# Patient Record
Sex: FEMALE | Race: WHITE | NOT HISPANIC OR LATINO | Employment: FULL TIME | URBAN - METROPOLITAN AREA
[De-identification: names, ages, dates, MRNs, and addresses within clinical notes are randomized per-mention and may not be internally consistent; named-entity substitution may affect disease eponyms.]

---

## 2017-05-01 ENCOUNTER — OFFICE VISIT (OUTPATIENT)
Dept: SURGERY | Facility: CLINIC | Age: 57
End: 2017-05-01

## 2017-05-01 VITALS — HEIGHT: 67 IN | HEART RATE: 87 BPM | OXYGEN SATURATION: 98 % | BODY MASS INDEX: 30.51 KG/M2 | WEIGHT: 194.4 LBS

## 2017-05-01 DIAGNOSIS — F17.200 SMOKER: ICD-10-CM

## 2017-05-01 DIAGNOSIS — K43.2 INCISIONAL HERNIA, WITHOUT OBSTRUCTION OR GANGRENE: Primary | ICD-10-CM

## 2017-05-01 DIAGNOSIS — Z20.818 MRSA EXPOSURE: ICD-10-CM

## 2017-05-01 PROCEDURE — 99204 OFFICE O/P NEW MOD 45 MIN: CPT | Performed by: SURGERY

## 2017-05-01 RX ORDER — SODIUM CHLORIDE 0.9 % (FLUSH) 0.9 %
1-10 SYRINGE (ML) INJECTION AS NEEDED
Status: CANCELLED | OUTPATIENT
Start: 2017-05-01

## 2017-05-01 RX ORDER — NAPROXEN SODIUM 220 MG
220 TABLET ORAL 2 TIMES DAILY PRN
COMMUNITY
End: 2018-10-01

## 2017-05-16 ENCOUNTER — TELEPHONE (OUTPATIENT)
Dept: FAMILY MEDICINE CLINIC | Facility: CLINIC | Age: 57
End: 2017-05-16

## 2017-05-17 ENCOUNTER — OFFICE VISIT (OUTPATIENT)
Dept: FAMILY MEDICINE CLINIC | Facility: CLINIC | Age: 57
End: 2017-05-17

## 2017-05-17 ENCOUNTER — TELEPHONE (OUTPATIENT)
Dept: FAMILY MEDICINE CLINIC | Facility: CLINIC | Age: 57
End: 2017-05-17

## 2017-05-17 VITALS
BODY MASS INDEX: 28.94 KG/M2 | DIASTOLIC BLOOD PRESSURE: 70 MMHG | OXYGEN SATURATION: 96 % | HEART RATE: 91 BPM | WEIGHT: 184.4 LBS | SYSTOLIC BLOOD PRESSURE: 120 MMHG | HEIGHT: 67 IN | TEMPERATURE: 97.9 F | RESPIRATION RATE: 16 BRPM

## 2017-05-17 DIAGNOSIS — Z87.19 HISTORY OF DIVERTICULITIS: ICD-10-CM

## 2017-05-17 DIAGNOSIS — R10.84 GENERALIZED ABDOMINAL PAIN: ICD-10-CM

## 2017-05-17 DIAGNOSIS — R19.7 DIARRHEA, UNSPECIFIED TYPE: Primary | ICD-10-CM

## 2017-05-17 LAB
ERYTHROCYTE [DISTWIDTH] IN BLOOD BY AUTOMATED COUNT: 13 % (ref 4.5–15)
HCT VFR BLD AUTO: 39.3 % (ref 31–42)
HGB BLD-MCNC: 13 G/DL (ref 12–18)
LYMPHOCYTES # BLD AUTO: 2.2 10*3/MM3 (ref 1.2–3.4)
LYMPHOCYTES NFR BLD AUTO: 25.1 % (ref 21–51)
MCH RBC QN AUTO: 30.3 PG (ref 26.1–33.1)
MCHC RBC AUTO-ENTMCNC: 33.1 G/DL (ref 33–37)
MCV RBC AUTO: 91.7 FL (ref 80–99)
MONOCYTES # BLD AUTO: 0.6 10*3/MM3 (ref 0.1–0.6)
MONOCYTES NFR BLD AUTO: 7.5 % (ref 2–9)
NEUTROPHILS # BLD AUTO: 5.8 10*3/MM3 (ref 1.4–6.5)
NEUTROPHILS NFR BLD AUTO: 67.4 % (ref 42–75)
PLATELET # BLD AUTO: 239 10*3/MM3 (ref 150–450)
PMV BLD AUTO: 8.3 FL (ref 7.1–10.5)
RBC # BLD AUTO: 4.28 10*6/MM3 (ref 4–6)
WBC NRBC COR # BLD: 8.6 10*3/MM3 (ref 4.5–10)

## 2017-05-17 PROCEDURE — 99214 OFFICE O/P EST MOD 30 MIN: CPT | Performed by: NURSE PRACTITIONER

## 2017-05-17 PROCEDURE — 36415 COLL VENOUS BLD VENIPUNCTURE: CPT | Performed by: NURSE PRACTITIONER

## 2017-05-17 PROCEDURE — 85025 COMPLETE CBC W/AUTO DIFF WBC: CPT | Performed by: NURSE PRACTITIONER

## 2017-05-17 RX ORDER — SACCHAROMYCES BOULARDII 250 MG
250 CAPSULE ORAL 2 TIMES DAILY
Qty: 60 CAPSULE | Refills: 1 | Status: SHIPPED | OUTPATIENT
Start: 2017-05-17 | End: 2017-12-07

## 2017-05-18 LAB
C DIFF TOX A+B STL QL IA: NEGATIVE
CRYPTOSP AG STL QL IA: NEGATIVE
G LAMBLIA AG STL QL IA: NEGATIVE

## 2017-05-19 ENCOUNTER — TELEPHONE (OUTPATIENT)
Dept: FAMILY MEDICINE CLINIC | Facility: CLINIC | Age: 57
End: 2017-05-19

## 2017-05-22 ENCOUNTER — TELEPHONE (OUTPATIENT)
Dept: FAMILY MEDICINE CLINIC | Facility: CLINIC | Age: 57
End: 2017-05-22

## 2017-05-22 LAB
CAMPYLOBACTER STL CULT: NORMAL
E COLI SXT STL QL IA: NEGATIVE
Lab: NORMAL
Lab: NORMAL
SALM + SHIG STL CULT: NORMAL

## 2017-05-24 ENCOUNTER — APPOINTMENT (OUTPATIENT)
Dept: CT IMAGING | Facility: HOSPITAL | Age: 57
End: 2017-05-24

## 2017-09-25 ENCOUNTER — OFFICE VISIT (OUTPATIENT)
Dept: OBSTETRICS AND GYNECOLOGY | Age: 57
End: 2017-09-25

## 2017-09-25 VITALS
HEIGHT: 66 IN | DIASTOLIC BLOOD PRESSURE: 66 MMHG | WEIGHT: 186 LBS | BODY MASS INDEX: 29.89 KG/M2 | SYSTOLIC BLOOD PRESSURE: 122 MMHG

## 2017-09-25 DIAGNOSIS — R63.8 INCREASED BMI: ICD-10-CM

## 2017-09-25 DIAGNOSIS — K43.2 INCISIONAL HERNIA, WITHOUT OBSTRUCTION OR GANGRENE: ICD-10-CM

## 2017-09-25 DIAGNOSIS — F17.200 SMOKER: ICD-10-CM

## 2017-09-25 DIAGNOSIS — Z01.419 ENCOUNTER FOR GYNECOLOGICAL EXAMINATION WITHOUT ABNORMAL FINDING: Primary | ICD-10-CM

## 2017-09-25 DIAGNOSIS — Z12.4 SCREENING FOR CERVICAL CANCER: ICD-10-CM

## 2017-09-25 PROCEDURE — 99396 PREV VISIT EST AGE 40-64: CPT | Performed by: OBSTETRICS & GYNECOLOGY

## 2017-09-25 NOTE — PROGRESS NOTES
Subjective   Chief Complaint   Patient presents with   • Annual Exam     PT HERE FOR ROUTINE AE WITH MG. SHE IS DOING WELL, DISCOVERED 6 MO'S AGO SHE HAS HERNIA (DOES NOT PLAN ON GETTING SURGERY ANYTIME SOON). LAST PAP 2016 (NEG AND NEG HPV). CHART BEING ORDERED.      History of Present Illness    Mariana Loya is a very pleasant  57 y.o. female who presents for annual exam.  , Mammo Exam TODAY, Contraception MP, Exercise AT WORK.    Obstetric History:  OB History      Para Term  AB Living      0       SAB TAB Ectopic Multiple Live Births                 Menstrual History:     No LMP recorded. Patient is postmenopausal.       Sexual History:       Past Medical History:   Diagnosis Date   • Colon polyps 10/07/2013    Ascending colon biopsy: fragments of tubular adenoma with low grade dysplasia   • Diverticulosis 10/07/2013    Found in Colonoscopy done by Dr. Eddi Anne   • Hyperlipidemia      Past Surgical History:   Procedure Laterality Date   • COLONOSCOPY W/ POLYPECTOMY N/A 10/07/2013    One 10 mm polyp in the proximal ascending colon: resected and retrieved, diverticulosis in the sigmoid colon and in the descending colon-Dr. Eddi Anne    • KNEE ARTHROSCOPY W/ MENISCECTOMY Left 2006    Left knee arthroscopy with complete lateral meniscectomy-Dr. Ming Thorpe   • LAPAROSCOPIC CHOLECYSTECTOMY W/ CHOLANGIOGRAPHY N/A 2001    Dr. Lucy Levi   • TONSILLECTOMY AND ADENOIDECTOMY N/A    • WISDOM TOOTH EXTRACTION N/A     Dr. Judith ROSE Hx:      The following portions of the patient's history were reviewed and updated as appropriate: allergies, current medications, past family history, past medical history, past social history, past surgical history and problem list.    Review of Systems        Except as outlined in history of physical illness, patient denies any changes in her GYN, , GI systems.  All other systems reviewed are negative         Objective   Physical  "Exam    /66  Ht 66\" (167.6 cm)  Wt 186 lb (84.4 kg)  BMI 30.02 kg/m2    General: Patient is alert and oriented and appears overall healthy  Neck: Is supple without thyromegaly, no carotid bruits and no lymphadenopathy  Lungs: Clear bilaterally, no wheezing, rhonchi, or rales.  Respiratory rate is normal  Breast: Even symmetrical, no lymphadenopathy, no retraction, no masses appreciated on either side  Heart: Regular rate and rhythm are appreciated, no murmurs or rubs are heard  Abdomen: Is soft, without organomegaly, bowel sounds are positive, there is no                                rebound or guarding and palpation does not produce any discomfort  Back: Nontender without CVA tenderness  Pelvic: External genitalia appear normal and consistent with mature female.  BUS normal                            Vagina is clean dry without discharge and appears adequately estrogenized, no               lesions or masses are present                         Cervix is noninflamed without discharge or lesions.  There is no cervical motion             tenderness.                Uterus is nonenlarged, without tenderness, and no masses or abnormalities are  present               Adnexa are non-enlarged, non tender               Rectal exam reveals adequate sphincter tone and no masses or lesions are                     appreciated on digital rectal examination.       Patient Active Problem List   Diagnosis   • Hyperlipidemia   • Smoker   • Incisional hernia, without obstruction or gangrene   • MRSA exposure                Assessment/Plan   There are no diagnoses linked to this encounter.    Annual Well Woman Exam    Discussed today's findings and concerns with patient in detail.  Continue to recommend regular exercise to include cardiovascular and resistance training and breast self-exam. Wellness lab, mammography, & pap smear, in accordance with age guidelines.  Dietary and caloric recommendations were " discussed.        All of the patient's questions were addressed and answered, I have encouraged her to call for today's test results if she has not received them within 10 days.  Patient is advised to call with any change in her condition or with any other questions, otherwise return in 12 months for annual examination.

## 2017-09-25 NOTE — PROGRESS NOTES
Subjective   Chief Complaint   Patient presents with   • Annual Exam     PT HERE FOR ROUTINE AE WITH MG. SHE IS DOING WELL, DISCOVERED 6 MO'S AGO SHE HAS HERNIA (DOES NOT PLAN ON GETTING SURGERY ANYTIME SOON). LAST PAP 2016 (NEG AND NEG HPV). CHART BEING ORDERED.      History of Present Illness    Mariana Loya is a very pleasant  57 y.o. female who presents for annual exam.  , Mammo Exam Completed,, Contraception post menopausal, Exercise moderate  Patient was diagnosed with a ventral hernia and is waiting to have surgery. She has no gynecological concerns or complaints..    Obstetric History:  OB History      Para Term  AB Living      0       SAB TAB Ectopic Multiple Live Births                 Menstrual History:     No LMP recorded. Patient is postmenopausal.       Sexual History:       Past Medical History:   Diagnosis Date   • Colon polyps 10/07/2013    Ascending colon biopsy: fragments of tubular adenoma with low grade dysplasia   • Diverticulosis 10/07/2013    Found in Colonoscopy done by Dr. Eddi Anne   • Hyperlipidemia      Past Surgical History:   Procedure Laterality Date   • COLONOSCOPY W/ POLYPECTOMY N/A 10/07/2013    One 10 mm polyp in the proximal ascending colon: resected and retrieved, diverticulosis in the sigmoid colon and in the descending colon-Dr. Eddi Anne    • KNEE ARTHROSCOPY W/ MENISCECTOMY Left 2006    Left knee arthroscopy with complete lateral meniscectomy-Dr. Ming Thorpe   • LAPAROSCOPIC CHOLECYSTECTOMY W/ CHOLANGIOGRAPHY N/A 2001    Dr. Lucy Levi   • TONSILLECTOMY AND ADENOIDECTOMY N/A    • WISDOM TOOTH EXTRACTION N/A     Dr. Judith ROSE Hx:      The following portions of the patient's history were reviewed and updated as appropriate: allergies, current medications, past family history, past medical history, past social history, past surgical history and problem list.    Review of Systems        Except as outlined in history of  "physical illness, patient denies any changes in her GYN, , GI systems.  All other systems reviewed are negative         Objective   Physical Exam    /66  Ht 66\" (167.6 cm)  Wt 186 lb (84.4 kg)  BMI 30.02 kg/m2    General: Patient is alert and oriented and appears overall healthy  Neck: Is supple without thyromegaly, no carotid bruits and no lymphadenopathy  Lungs: Clear bilaterally, no wheezing, rhonchi, or rales.  Respiratory rate is normal  Breast: Even symmetrical, no lymphadenopathy, no retraction, no masses appreciated on either side  Heart: Regular rate and rhythm are appreciated, no murmurs or rubs are heard  Abdomen: Is soft, without organomegaly, bowel sounds are positive, there is no                                rebound or guarding and palpation does not produce any discomfort  Back: Nontender without CVA tenderness  Pelvic: External genitalia appear normal and consistent with mature female.  BUS normal                            Vagina is clean dry without discharge and appears adequately estrogenized, no               lesions or masses are present                         Cervix is noninflamed without discharge or lesions.  There is no cervical motion             tenderness.                Uterus is nonenlarged, without tenderness, and no masses or abnormalities are  present               Adnexa are non-enlarged, non tender               Rectal exam reveals adequate sphincter tone and no masses or lesions are                     appreciated on digital rectal examination.       Patient Active Problem List   Diagnosis   • Hyperlipidemia   • Smoker   • Incisional hernia, without obstruction or gangrene   • MRSA exposure                Assessment/Plan   Mariana was seen today for annual exam.    Diagnoses and all orders for this visit:    Encounter for gynecological examination without abnormal finding  -     IGP, Aptima HPV, Rfx 16 / 18,45    Screening for cervical cancer  -     IGP, Aptima HPV, " Rfx 16 / 18,45    Smoker    Incisional hernia, without obstruction or gangrene  Patient was scheduled for surgery but financially she cannot afford her deductible and has delayed that  Increased BMI  Exercising, discussed nutrition    On endoscopy due to thousand 18      Annual Well Woman Exam    Discussed today's findings and concerns with patient in detail.  Continue to recommend regular exercise to include cardiovascular and resistance training and breast self-exam. Wellness lab, mammography, & pap smear, in accordance with age guidelines.  Dietary and caloric recommendations were discussed.        All of the patient's questions were addressed and answered, I have encouraged her to call for today's test results if she has not received them within 10 days.  Patient is advised to call with any change in her condition or with any other questions, otherwise return in 12 months for annual examination.

## 2017-09-27 LAB
CYTOLOGIST CVX/VAG CYTO: NORMAL
CYTOLOGY CVX/VAG DOC THIN PREP: NORMAL
DX ICD CODE: NORMAL
HIV 1 & 2 AB SER-IMP: NORMAL
HPV I/H RISK 4 DNA CVX QL PROBE+SIG AMP: NEGATIVE
OTHER STN SPEC: NORMAL
PATH REPORT.FINAL DX SPEC: NORMAL
STAT OF ADQ CVX/VAG CYTO-IMP: NORMAL

## 2017-12-07 ENCOUNTER — OFFICE VISIT (OUTPATIENT)
Dept: FAMILY MEDICINE CLINIC | Facility: CLINIC | Age: 57
End: 2017-12-07

## 2017-12-07 VITALS
SYSTOLIC BLOOD PRESSURE: 142 MMHG | TEMPERATURE: 98.8 F | WEIGHT: 187.8 LBS | HEART RATE: 92 BPM | DIASTOLIC BLOOD PRESSURE: 76 MMHG | BODY MASS INDEX: 30.18 KG/M2 | HEIGHT: 66 IN | OXYGEN SATURATION: 98 %

## 2017-12-07 DIAGNOSIS — L30.9 DERMATITIS: ICD-10-CM

## 2017-12-07 DIAGNOSIS — Z00.00 ENCOUNTER FOR ANNUAL GENERAL MEDICAL EXAMINATION WITHOUT ABNORMAL FINDINGS IN ADULT: Primary | ICD-10-CM

## 2017-12-07 DIAGNOSIS — E78.49 OTHER HYPERLIPIDEMIA: ICD-10-CM

## 2017-12-07 LAB
ALBUMIN SERPL-MCNC: 3.9 G/DL (ref 3.5–5.2)
ALBUMIN/GLOB SERPL: 1.2 G/DL
ALP SERPL-CCNC: 73 U/L (ref 39–117)
ALT SERPL W P-5'-P-CCNC: 25 U/L (ref 1–33)
ANION GAP SERPL CALCULATED.3IONS-SCNC: 10.9 MMOL/L
AST SERPL-CCNC: 15 U/L (ref 1–32)
BILIRUB SERPL-MCNC: <0.2 MG/DL (ref 0.1–1.2)
BUN BLD-MCNC: 14 MG/DL (ref 6–20)
BUN/CREAT SERPL: 27.5 (ref 7–25)
CALCIUM SPEC-SCNC: 9.2 MG/DL (ref 8.6–10.5)
CHLORIDE SERPL-SCNC: 108 MMOL/L (ref 98–107)
CHOLEST SERPL-MCNC: 210 MG/DL (ref 0–200)
CO2 SERPL-SCNC: 24.1 MMOL/L (ref 22–29)
CREAT BLD-MCNC: 0.51 MG/DL (ref 0.57–1)
GFR SERPL CREATININE-BSD FRML MDRD: 124 ML/MIN/1.73
GLOBULIN UR ELPH-MCNC: 3.3 GM/DL
GLUCOSE BLD-MCNC: 101 MG/DL (ref 65–99)
HDLC SERPL-MCNC: 37 MG/DL (ref 40–60)
LDLC SERPL CALC-MCNC: 153 MG/DL (ref 0–100)
LDLC/HDLC SERPL: 4.15 {RATIO}
POTASSIUM BLD-SCNC: 4.2 MMOL/L (ref 3.5–5.2)
PROT SERPL-MCNC: 7.2 G/DL (ref 6–8.5)
SODIUM BLD-SCNC: 143 MMOL/L (ref 136–145)
TRIGL SERPL-MCNC: 98 MG/DL (ref 0–150)
VLDLC SERPL-MCNC: 19.6 MG/DL (ref 5–40)

## 2017-12-07 PROCEDURE — 80061 LIPID PANEL: CPT | Performed by: INTERNAL MEDICINE

## 2017-12-07 PROCEDURE — 36415 COLL VENOUS BLD VENIPUNCTURE: CPT | Performed by: INTERNAL MEDICINE

## 2017-12-07 PROCEDURE — 80053 COMPREHEN METABOLIC PANEL: CPT | Performed by: INTERNAL MEDICINE

## 2017-12-07 PROCEDURE — 99396 PREV VISIT EST AGE 40-64: CPT | Performed by: INTERNAL MEDICINE

## 2017-12-07 RX ORDER — ROSUVASTATIN CALCIUM 10 MG/1
TABLET, COATED ORAL
Qty: 30 TABLET | Refills: 0 | Status: SHIPPED | OUTPATIENT
Start: 2017-12-07 | End: 2018-10-01

## 2017-12-07 RX ORDER — CLOTRIMAZOLE AND BETAMETHASONE DIPROPIONATE 10; .64 MG/G; MG/G
CREAM TOPICAL 2 TIMES DAILY
Qty: 30 G | Refills: 1 | Status: SHIPPED | OUTPATIENT
Start: 2017-12-07 | End: 2018-10-01

## 2017-12-07 NOTE — PROGRESS NOTES
Subjective   Mariana Loya is a 57 y.o. female.   Patient here for general yearly medical evaluation.  No new complaints is mild left knee trouble whilst doing fairly well.  Does when a rash on her left leg which she wants examined also.  History of Present Illness   General checkup does have known elevated lipids is very concerned about statins in no she's been off this before.  There is a family history of premature heart disease in father passed at age 49.  Discuss this again with her next encouraged her try 1 she is concern with Lipitor in association with diabetes we discussed this also mentions that Crestor now does generic has a very good cardioprotective type benefit based on studies also suggested she begin a be aspirin day.  Enteric-coated 81 mg.    Review of Systems   Musculoskeletal:        Long-standing left knee stiffness and swelling is residual after prior surgery   Skin:        Rash left leg   All other systems reviewed and are negative.      Objective   Vitals:    12/07/17 0801   BP: 142/76   Pulse: 92   Temp: 98.8 °F (37.1 °C)   SpO2: 98%   Weight: 85.2 kg (187 lb 12.8 oz)     Physical Exam   Constitutional: She appears well-developed and well-nourished.   HENT:   Head: Atraumatic.   Eyes: Conjunctivae are normal. Pupils are equal, round, and reactive to light.   Neck:   No carotid bruits, no increased cervical nodes.   Cardiovascular: Normal rate, regular rhythm and normal heart sounds.    Left and right carotid, radial, femoral, posterior tibial, dorsalis pedis pulses are 2+.   Pulmonary/Chest: Effort normal and breath sounds normal.   No increased cervical, supra/infraclavicular, or inguinal lymphadenopathy noted.   Abdominal: Soft. Bowel sounds are normal.   Musculoskeletal:   Mild swelling long-standing, a residual from prior surgery.   Neurological: She is alert.   Unremarkable gait and station   Skin: Skin is warm and dry.   Several patchy areas above left ankle somewhat patchy with  flakiness appear to be more of a fungal etiology type.  Does claim itching with these.  No rash elsewhere with this.   Nursing note and vitals reviewed.      No results found for: INR    Procedures    Assessment/Plan 1.  Encounter for annual adult wellness examination    2.  Hyperlipidemia plan updated labs we did give patient a prescription for Crestor as generic 10 mg encouraged to begin his lab tab daily she is going to research that if she wants to do this with her concern for side effects and her wanting to avoid medicines whenever possible.    3.  Dermatitis left distal lower leg appears likely fungal in origin plan Lotrisone cream to area twice a day if not much improved 2 weeks time patient is to call.    Also family history for premature heart disease did encourage both enteric-coated aspirin as well as did encourage her to consider statin    Enteric-coated a 1 mg aspirin daily discussed Crestor trial suggest Crestor 10 mg half tab daily recheck lab in 6 weeks patient does wish to think about this.    Smoking cessation are discussed with admission Wellbutrin patient is tried Chantix without benefit does not want to try Wellbutrin has tried the patches without benefit which he believes is with weaning down.  Which she will attempt on her.        Much of this encounter note is an electronic transcription/translation of spoken language to printed text.  The electronic translation of spoken language may permit erroneous, or at times, nonsensical words or phrases to be inadvertently transcribed.  Although I have reviewed the note for such errors, some may still exist.

## 2018-04-23 ENCOUNTER — CLINICAL SUPPORT (OUTPATIENT)
Dept: FAMILY MEDICINE CLINIC | Facility: CLINIC | Age: 58
End: 2018-04-23

## 2018-04-23 DIAGNOSIS — Z00.00 PREVENTATIVE HEALTH CARE: Primary | ICD-10-CM

## 2018-04-23 PROCEDURE — 90471 IMMUNIZATION ADMIN: CPT | Performed by: INTERNAL MEDICINE

## 2018-04-23 PROCEDURE — 90632 HEPA VACCINE ADULT IM: CPT | Performed by: INTERNAL MEDICINE

## 2018-06-20 ENCOUNTER — TELEPHONE (OUTPATIENT)
Dept: FAMILY MEDICINE CLINIC | Facility: CLINIC | Age: 58
End: 2018-06-20

## 2018-06-20 NOTE — TELEPHONE ENCOUNTER
PT ASKED IF DR. CENTENO COULD CALL IN AN RX FOR NICODERM CQ PATCHES TO HELP HER QUIT SMOKING? [facility] [address] [facility] [phone]

## 2018-06-21 RX ORDER — NICOTINE 21 MG/24HR
1 PATCH, TRANSDERMAL 24 HOURS TRANSDERMAL EVERY 24 HOURS
Qty: 30 PATCH | Refills: 0 | Status: SHIPPED | OUTPATIENT
Start: 2018-06-21 | End: 2018-06-25 | Stop reason: SDUPTHER

## 2018-06-25 ENCOUNTER — TELEPHONE (OUTPATIENT)
Dept: FAMILY MEDICINE CLINIC | Facility: CLINIC | Age: 58
End: 2018-06-25

## 2018-06-25 RX ORDER — NICOTINE 21 MG/24HR
1 PATCH, TRANSDERMAL 24 HOURS TRANSDERMAL EVERY 24 HOURS
Qty: 21 PATCH | Refills: 0 | Status: SHIPPED | OUTPATIENT
Start: 2018-06-25 | End: 2018-10-01

## 2018-06-25 NOTE — TELEPHONE ENCOUNTER
Okay to do not sure which brand names are still out there so any those would be okay with me.  Have her do that for 3 weeks we will try to shift her down to a lesser strength.

## 2018-09-08 ENCOUNTER — ANESTHESIA EVENT (OUTPATIENT)
Dept: PERIOP | Facility: HOSPITAL | Age: 58
End: 2018-09-08

## 2018-09-08 ENCOUNTER — HOSPITAL ENCOUNTER (INPATIENT)
Facility: HOSPITAL | Age: 58
LOS: 1 days | Discharge: HOME | End: 2018-09-09
Attending: EMERGENCY MEDICINE | Admitting: SURGERY

## 2018-09-08 ENCOUNTER — APPOINTMENT (OUTPATIENT)
Dept: CT IMAGING | Facility: HOSPITAL | Age: 58
End: 2018-09-08

## 2018-09-08 ENCOUNTER — ANESTHESIA (OUTPATIENT)
Dept: PERIOP | Facility: HOSPITAL | Age: 58
End: 2018-09-08

## 2018-09-08 DIAGNOSIS — K46.0 INCARCERATED HERNIA: Primary | ICD-10-CM

## 2018-09-08 DIAGNOSIS — K56.609 SBO (SMALL BOWEL OBSTRUCTION) (HCC): ICD-10-CM

## 2018-09-08 LAB
ALBUMIN SERPL-MCNC: 4.2 G/DL (ref 3.5–5.2)
ALBUMIN/GLOB SERPL: 1.6 G/DL
ALP SERPL-CCNC: 67 U/L (ref 39–117)
ALT SERPL W P-5'-P-CCNC: 19 U/L (ref 1–33)
ANION GAP SERPL CALCULATED.3IONS-SCNC: 10.7 MMOL/L
AST SERPL-CCNC: 14 U/L (ref 1–32)
BACTERIA UR QL AUTO: ABNORMAL /HPF
BASOPHILS # BLD AUTO: 0.01 10*3/MM3 (ref 0–0.2)
BASOPHILS NFR BLD AUTO: 0.1 % (ref 0–1.5)
BILIRUB SERPL-MCNC: 0.3 MG/DL (ref 0.1–1.2)
BILIRUB UR QL STRIP: NEGATIVE
BUN BLD-MCNC: 11 MG/DL (ref 6–20)
BUN/CREAT SERPL: 16.7 (ref 7–25)
CALCIUM SPEC-SCNC: 8.7 MG/DL (ref 8.6–10.5)
CHLORIDE SERPL-SCNC: 108 MMOL/L (ref 98–107)
CLARITY UR: CLEAR
CO2 SERPL-SCNC: 24.3 MMOL/L (ref 22–29)
COLOR UR: YELLOW
CREAT BLD-MCNC: 0.66 MG/DL (ref 0.57–1)
D-LACTATE SERPL-SCNC: 0.7 MMOL/L (ref 0.5–2)
DEPRECATED RDW RBC AUTO: 46.6 FL (ref 37–54)
EOSINOPHIL # BLD AUTO: 0.07 10*3/MM3 (ref 0–0.7)
EOSINOPHIL NFR BLD AUTO: 1 % (ref 0.3–6.2)
ERYTHROCYTE [DISTWIDTH] IN BLOOD BY AUTOMATED COUNT: 13.8 % (ref 11.7–13)
GFR SERPL CREATININE-BSD FRML MDRD: 92 ML/MIN/1.73
GLOBULIN UR ELPH-MCNC: 2.7 GM/DL
GLUCOSE BLD-MCNC: 124 MG/DL (ref 65–99)
GLUCOSE UR STRIP-MCNC: NEGATIVE MG/DL
HCT VFR BLD AUTO: 42 % (ref 35.6–45.5)
HGB BLD-MCNC: 14.2 G/DL (ref 11.9–15.5)
HGB UR QL STRIP.AUTO: ABNORMAL
HYALINE CASTS UR QL AUTO: ABNORMAL /LPF
IMM GRANULOCYTES # BLD: 0.01 10*3/MM3 (ref 0–0.03)
IMM GRANULOCYTES NFR BLD: 0.1 % (ref 0–0.5)
KETONES UR QL STRIP: NEGATIVE
LEUKOCYTE ESTERASE UR QL STRIP.AUTO: NEGATIVE
LIPASE SERPL-CCNC: 21 U/L (ref 13–60)
LYMPHOCYTES # BLD AUTO: 2.38 10*3/MM3 (ref 0.9–4.8)
LYMPHOCYTES NFR BLD AUTO: 33.2 % (ref 19.6–45.3)
MCH RBC QN AUTO: 31.3 PG (ref 26.9–32)
MCHC RBC AUTO-ENTMCNC: 33.8 G/DL (ref 32.4–36.3)
MCV RBC AUTO: 92.5 FL (ref 80.5–98.2)
MONOCYTES # BLD AUTO: 0.74 10*3/MM3 (ref 0.2–1.2)
MONOCYTES NFR BLD AUTO: 10.3 % (ref 5–12)
NEUTROPHILS # BLD AUTO: 3.97 10*3/MM3 (ref 1.9–8.1)
NEUTROPHILS NFR BLD AUTO: 55.4 % (ref 42.7–76)
NITRITE UR QL STRIP: NEGATIVE
PH UR STRIP.AUTO: 7.5 [PH] (ref 5–8)
PLATELET # BLD AUTO: 211 10*3/MM3 (ref 140–500)
PMV BLD AUTO: 11.2 FL (ref 6–12)
POTASSIUM BLD-SCNC: 3.9 MMOL/L (ref 3.5–5.2)
PROT SERPL-MCNC: 6.9 G/DL (ref 6–8.5)
PROT UR QL STRIP: NEGATIVE
RBC # BLD AUTO: 4.54 10*6/MM3 (ref 3.9–5.2)
RBC # UR: ABNORMAL /HPF
REF LAB TEST METHOD: ABNORMAL
SODIUM BLD-SCNC: 143 MMOL/L (ref 136–145)
SP GR UR STRIP: >=1.03 (ref 1–1.03)
SQUAMOUS #/AREA URNS HPF: ABNORMAL /HPF
UROBILINOGEN UR QL STRIP: ABNORMAL
WBC NRBC COR # BLD: 7.17 10*3/MM3 (ref 4.5–10.7)
WBC UR QL AUTO: ABNORMAL /HPF

## 2018-09-08 PROCEDURE — 25010000002 VANCOMYCIN PER 500 MG: Performed by: ANESTHESIOLOGY

## 2018-09-08 PROCEDURE — 99284 EMERGENCY DEPT VISIT MOD MDM: CPT

## 2018-09-08 PROCEDURE — 83690 ASSAY OF LIPASE: CPT | Performed by: EMERGENCY MEDICINE

## 2018-09-08 PROCEDURE — C1781 MESH (IMPLANTABLE): HCPCS | Performed by: SURGERY

## 2018-09-08 PROCEDURE — 74177 CT ABD & PELVIS W/CONTRAST: CPT

## 2018-09-08 PROCEDURE — 25010000002 ONDANSETRON PER 1 MG: Performed by: EMERGENCY MEDICINE

## 2018-09-08 PROCEDURE — 81001 URINALYSIS AUTO W/SCOPE: CPT | Performed by: EMERGENCY MEDICINE

## 2018-09-08 PROCEDURE — 25010000002 ONDANSETRON PER 1 MG: Performed by: ANESTHESIOLOGY

## 2018-09-08 PROCEDURE — 99223 1ST HOSP IP/OBS HIGH 75: CPT | Performed by: SURGERY

## 2018-09-08 PROCEDURE — 85025 COMPLETE CBC W/AUTO DIFF WBC: CPT | Performed by: EMERGENCY MEDICINE

## 2018-09-08 PROCEDURE — 25010000002 IOPAMIDOL 61 % SOLUTION: Performed by: EMERGENCY MEDICINE

## 2018-09-08 PROCEDURE — 83605 ASSAY OF LACTIC ACID: CPT | Performed by: EMERGENCY MEDICINE

## 2018-09-08 PROCEDURE — 80053 COMPREHEN METABOLIC PANEL: CPT | Performed by: EMERGENCY MEDICINE

## 2018-09-08 PROCEDURE — 25010000002 SUCCINYLCHOLINE PER 20 MG: Performed by: ANESTHESIOLOGY

## 2018-09-08 PROCEDURE — 25010000002 PROPOFOL 10 MG/ML EMULSION: Performed by: ANESTHESIOLOGY

## 2018-09-08 PROCEDURE — 49561 PR REPAIR INCISIONAL HERNIA,STRANG: CPT | Performed by: SURGERY

## 2018-09-08 PROCEDURE — 49568 PR IMPLANT MESH HERNIA REPAIR/DEBRIDEMENT CLOSURE: CPT | Performed by: SURGERY

## 2018-09-08 PROCEDURE — 0WUF0JZ SUPPLEMENT ABDOMINAL WALL WITH SYNTHETIC SUBSTITUTE, OPEN APPROACH: ICD-10-PCS | Performed by: SURGERY

## 2018-09-08 PROCEDURE — 25010000002 MORPHINE PER 10 MG: Performed by: EMERGENCY MEDICINE

## 2018-09-08 DEVICE — VENTRALEX ST HERNIA PATCH
Type: IMPLANTABLE DEVICE | Site: ABDOMEN | Status: FUNCTIONAL
Brand: VENTRALEX ST HERNIA PATCH

## 2018-09-08 RX ORDER — MIDAZOLAM HYDROCHLORIDE 1 MG/ML
2 INJECTION INTRAMUSCULAR; INTRAVENOUS
Status: DISCONTINUED | OUTPATIENT
Start: 2018-09-08 | End: 2018-09-09 | Stop reason: HOSPADM

## 2018-09-08 RX ORDER — SUCCINYLCHOLINE CHLORIDE 20 MG/ML
INJECTION INTRAMUSCULAR; INTRAVENOUS AS NEEDED
Status: DISCONTINUED | OUTPATIENT
Start: 2018-09-08 | End: 2018-09-09 | Stop reason: SURG

## 2018-09-08 RX ORDER — ONDANSETRON 2 MG/ML
4 INJECTION INTRAMUSCULAR; INTRAVENOUS ONCE
Status: COMPLETED | OUTPATIENT
Start: 2018-09-08 | End: 2018-09-08

## 2018-09-08 RX ORDER — MORPHINE SULFATE 2 MG/ML
2 INJECTION, SOLUTION INTRAMUSCULAR; INTRAVENOUS ONCE
Status: COMPLETED | OUTPATIENT
Start: 2018-09-08 | End: 2018-09-08

## 2018-09-08 RX ORDER — MIDAZOLAM HYDROCHLORIDE 1 MG/ML
1 INJECTION INTRAMUSCULAR; INTRAVENOUS
Status: DISCONTINUED | OUTPATIENT
Start: 2018-09-08 | End: 2018-09-09 | Stop reason: HOSPADM

## 2018-09-08 RX ORDER — SODIUM CHLORIDE 0.9 % (FLUSH) 0.9 %
10 SYRINGE (ML) INJECTION AS NEEDED
Status: DISCONTINUED | OUTPATIENT
Start: 2018-09-08 | End: 2018-09-09 | Stop reason: HOSPADM

## 2018-09-08 RX ORDER — BUPIVACAINE HYDROCHLORIDE AND EPINEPHRINE 5; 5 MG/ML; UG/ML
INJECTION, SOLUTION PERINEURAL AS NEEDED
Status: DISCONTINUED | OUTPATIENT
Start: 2018-09-08 | End: 2018-09-09 | Stop reason: HOSPADM

## 2018-09-08 RX ORDER — FENTANYL CITRATE 50 UG/ML
INJECTION, SOLUTION INTRAMUSCULAR; INTRAVENOUS AS NEEDED
Status: DISCONTINUED | OUTPATIENT
Start: 2018-09-08 | End: 2018-09-09 | Stop reason: SURG

## 2018-09-08 RX ORDER — FENTANYL CITRATE 50 UG/ML
50 INJECTION, SOLUTION INTRAMUSCULAR; INTRAVENOUS
Status: DISCONTINUED | OUTPATIENT
Start: 2018-09-08 | End: 2018-09-09 | Stop reason: HOSPADM

## 2018-09-08 RX ORDER — FENTANYL CITRATE 50 UG/ML
INJECTION, SOLUTION INTRAMUSCULAR; INTRAVENOUS
Status: COMPLETED
Start: 2018-09-08 | End: 2018-09-08

## 2018-09-08 RX ORDER — SODIUM CHLORIDE, SODIUM LACTATE, POTASSIUM CHLORIDE, CALCIUM CHLORIDE 600; 310; 30; 20 MG/100ML; MG/100ML; MG/100ML; MG/100ML
9 INJECTION, SOLUTION INTRAVENOUS CONTINUOUS
Status: DISCONTINUED | OUTPATIENT
Start: 2018-09-08 | End: 2018-09-09

## 2018-09-08 RX ORDER — PROPOFOL 10 MG/ML
VIAL (ML) INTRAVENOUS AS NEEDED
Status: DISCONTINUED | OUTPATIENT
Start: 2018-09-08 | End: 2018-09-09 | Stop reason: SURG

## 2018-09-08 RX ORDER — LIDOCAINE HYDROCHLORIDE 10 MG/ML
0.5 INJECTION, SOLUTION EPIDURAL; INFILTRATION; INTRACAUDAL; PERINEURAL ONCE AS NEEDED
Status: DISCONTINUED | OUTPATIENT
Start: 2018-09-08 | End: 2018-09-09 | Stop reason: HOSPADM

## 2018-09-08 RX ORDER — ROCURONIUM BROMIDE 10 MG/ML
INJECTION, SOLUTION INTRAVENOUS AS NEEDED
Status: DISCONTINUED | OUTPATIENT
Start: 2018-09-08 | End: 2018-09-09 | Stop reason: SURG

## 2018-09-08 RX ORDER — SODIUM CHLORIDE 0.9 % (FLUSH) 0.9 %
1-10 SYRINGE (ML) INJECTION AS NEEDED
Status: DISCONTINUED | OUTPATIENT
Start: 2018-09-08 | End: 2018-09-09 | Stop reason: HOSPADM

## 2018-09-08 RX ORDER — FAMOTIDINE 10 MG/ML
20 INJECTION, SOLUTION INTRAVENOUS ONCE
Status: COMPLETED | OUTPATIENT
Start: 2018-09-08 | End: 2018-09-08

## 2018-09-08 RX ORDER — SODIUM CHLORIDE 9 MG/ML
125 INJECTION, SOLUTION INTRAVENOUS CONTINUOUS
Status: DISCONTINUED | OUTPATIENT
Start: 2018-09-08 | End: 2018-09-09

## 2018-09-08 RX ORDER — ONDANSETRON 2 MG/ML
INJECTION INTRAMUSCULAR; INTRAVENOUS AS NEEDED
Status: DISCONTINUED | OUTPATIENT
Start: 2018-09-08 | End: 2018-09-09 | Stop reason: SURG

## 2018-09-08 RX ADMIN — FENTANYL CITRATE 100 MCG: 50 INJECTION INTRAMUSCULAR; INTRAVENOUS at 23:07

## 2018-09-08 RX ADMIN — SUCCINYLCHOLINE CHLORIDE 160 MG: 20 INJECTION, SOLUTION INTRAMUSCULAR; INTRAVENOUS; PARENTERAL at 23:07

## 2018-09-08 RX ADMIN — SODIUM CHLORIDE 125 ML/HR: 9 INJECTION, SOLUTION INTRAVENOUS at 19:05

## 2018-09-08 RX ADMIN — ONDANSETRON 4 MG: 2 INJECTION INTRAMUSCULAR; INTRAVENOUS at 19:07

## 2018-09-08 RX ADMIN — VANCOMYCIN HYDROCHLORIDE 1250 MG: 500 INJECTION, POWDER, LYOPHILIZED, FOR SOLUTION INTRAVENOUS at 23:12

## 2018-09-08 RX ADMIN — FAMOTIDINE 20 MG: 10 INJECTION INTRAVENOUS at 22:47

## 2018-09-08 RX ADMIN — MORPHINE SULFATE 2 MG: 2 INJECTION, SOLUTION INTRAMUSCULAR; INTRAVENOUS at 19:07

## 2018-09-08 RX ADMIN — SODIUM CHLORIDE, POTASSIUM CHLORIDE, SODIUM LACTATE AND CALCIUM CHLORIDE: 600; 310; 30; 20 INJECTION, SOLUTION INTRAVENOUS at 23:00

## 2018-09-08 RX ADMIN — SODIUM CHLORIDE, POTASSIUM CHLORIDE, SODIUM LACTATE AND CALCIUM CHLORIDE: 600; 310; 30; 20 INJECTION, SOLUTION INTRAVENOUS at 23:50

## 2018-09-08 RX ADMIN — ROCURONIUM BROMIDE 30 MG: 10 INJECTION INTRAVENOUS at 23:10

## 2018-09-08 RX ADMIN — SODIUM CHLORIDE, POTASSIUM CHLORIDE, SODIUM LACTATE AND CALCIUM CHLORIDE 9 ML/HR: 600; 310; 30; 20 INJECTION, SOLUTION INTRAVENOUS at 22:47

## 2018-09-08 RX ADMIN — PROPOFOL 150 MG: 10 INJECTION, EMULSION INTRAVENOUS at 23:07

## 2018-09-08 RX ADMIN — IOPAMIDOL 85 ML: 612 INJECTION, SOLUTION INTRAVENOUS at 20:17

## 2018-09-08 RX ADMIN — SUGAMMADEX 200 MG: 100 INJECTION, SOLUTION INTRAVENOUS at 23:59

## 2018-09-08 RX ADMIN — ONDANSETRON 4 MG: 2 INJECTION INTRAMUSCULAR; INTRAVENOUS at 23:59

## 2018-09-08 NOTE — ED NOTES
Resting quietly without complaints.  States pain better after meds, but still present.  Pain 5/10.     Hilda Ramirez, RN  09/08/18 1921

## 2018-09-08 NOTE — ED PROVIDER NOTES
EMERGENCY DEPARTMENT ENCOUNTER    CHIEF COMPLAINT  Chief Complaint: Abd pain  History given by: Pt  History limited by: none  Room Number: 10/10  PMD: Nakul Hinds Jr., MD  Surgeon Dr. Levi    HPI:  Pt is a 58 y.o. female who presents complaining of mid abd pain secondary to a known abd hernia. Pt states pain started at 12:15 PM after she was lifting boxes and twisting at work. Pt has h/o cholecystectomy and developed hernia post-surgery. Pt was going to have hernia repaired in January 2019. Pt has no other sx at this time.     Duration:  Since 12:15 PM  Onset: gradual  Timing: constant  Location: mid abd  Quality: pain  Intensity/Severity: moderate  Progression: worsening  Associated Symptoms: none stated  Aggravating Factors: lifting and twisting at work  Alleviating Factors: none  Previous Episodes: Pt has known abd hernia  Treatment before arrival: none    PAST MEDICAL HISTORY  Active Ambulatory Problems     Diagnosis Date Noted   • Hyperlipidemia 09/19/2016   • Smoker 09/19/2016   • Incisional hernia, without obstruction or gangrene 05/01/2017   • MRSA exposure 05/01/2017     Resolved Ambulatory Problems     Diagnosis Date Noted   • No Resolved Ambulatory Problems     Past Medical History:   Diagnosis Date   • Colon polyps 10/07/2013   • Diverticulosis 10/07/2013   • Hyperlipidemia        PAST SURGICAL HISTORY  Past Surgical History:   Procedure Laterality Date   • COLONOSCOPY W/ POLYPECTOMY N/A 10/07/2013    One 10 mm polyp in the proximal ascending colon: resected and retrieved, diverticulosis in the sigmoid colon and in the descending colon-Dr. Eddi Anne    • KNEE ARTHROSCOPY W/ MENISCECTOMY Left 11/17/2006    Left knee arthroscopy with complete lateral meniscectomy-Dr. Ming Thorpe   • LAPAROSCOPIC CHOLECYSTECTOMY W/ CHOLANGIOGRAPHY N/A 11/14/2001    Dr. Lucy Levi   • TONSILLECTOMY AND ADENOIDECTOMY N/A    • WISDOM TOOTH EXTRACTION N/A 1980    Dr. Wong       FAMILY HISTORY  Family  History   Problem Relation Age of Onset   • Diabetes Mother    • Heart disease Mother         chf   • Heart disease Father    • No Known Problems Sister    • No Known Problems Brother    • No Known Problems Daughter    • No Known Problems Son    • No Known Problems Maternal Grandmother    • No Known Problems Paternal Grandmother    • No Known Problems Maternal Aunt    • No Known Problems Paternal Aunt    • BRCA 1/2 Neg Hx    • Breast cancer Neg Hx    • Colon cancer Neg Hx    • Endometrial cancer Neg Hx    • Ovarian cancer Neg Hx        SOCIAL HISTORY  Social History     Social History   • Marital status:      Spouse name: Orlando   • Number of children: N/A   • Years of education: N/A     Occupational History   • Not on file.     Social History Main Topics   • Smoking status: Current Every Day Smoker     Packs/day: 1.00     Years: 44.00   • Smokeless tobacco: Not on file   • Alcohol use No   • Drug use: No   • Sexual activity: Not on file     Other Topics Concern   • Not on file     Social History Narrative   • No narrative on file       ALLERGIES  Patient has no known allergies.    REVIEW OF SYSTEMS  Review of Systems   Constitutional: Negative for fever.   HENT: Negative for sore throat.    Eyes: Negative.    Respiratory: Negative for cough and shortness of breath.    Cardiovascular: Negative for chest pain.   Gastrointestinal: Positive for abdominal pain (mid). Negative for diarrhea and vomiting.        Known abd hernia   Genitourinary: Negative for dysuria.   Musculoskeletal: Negative for neck pain.   Skin: Negative for rash.   Allergic/Immunologic: Negative.    Neurological: Negative for weakness, numbness and headaches.   Hematological: Negative.    Psychiatric/Behavioral: Negative.    All other systems reviewed and are negative.      PHYSICAL EXAM  ED Triage Vitals [09/08/18 1838]   Temp Heart Rate Resp BP SpO2   98.6 °F (37 °C) (!) 121 18 -- 96 %      Temp src Heart Rate Source Patient Position BP  Location FiO2 (%)   -- Monitor -- -- --       Physical Exam   Constitutional: She is oriented to person, place, and time. No distress.   HENT:   Head: Normocephalic and atraumatic.   Eyes: Pupils are equal, round, and reactive to light. EOM are normal.   Neck: Normal range of motion. Neck supple.   Cardiovascular: Normal rate, regular rhythm and normal heart sounds.    Pulmonary/Chest: Effort normal and breath sounds normal. No respiratory distress.   Abdominal: Soft. There is tenderness (mid abd). There is no rebound and no guarding. A hernia (ventral hernia, tender, not reducible) is present.   Musculoskeletal: Normal range of motion. She exhibits no edema.   Neurological: She is alert and oriented to person, place, and time. She has normal sensation and normal strength.   Skin: Skin is warm and dry. No rash noted.   Psychiatric: Mood and affect normal.   Nursing note and vitals reviewed.      LAB RESULTS  Lab Results (last 24 hours)     Procedure Component Value Units Date/Time    CBC & Differential [[id number]] Collected:  09/08/18 1900    Specimen:  Blood Updated:  09/08/18 1919    Narrative:       The following orders were created for panel order CBC & Differential.  Procedure                               Abnormality         Status                     ---------                               -----------         ------                     CBC Auto Differential[[id number]]        Abnormal            Final result                 Please view results for these tests on the individual orders.    Comprehensive Metabolic Panel [[id number]]  (Abnormal) Collected:  09/08/18 1900    Specimen:  Blood Updated:  09/08/18 1937     Glucose 124 (H) mg/dL      BUN 11 mg/dL      Creatinine 0.66 mg/dL      Sodium 143 mmol/L      Potassium 3.9 mmol/L      Chloride 108 (H) mmol/L      CO2 24.3 mmol/L      Calcium 8.7 mg/dL      Total Protein 6.9 g/dL      Albumin 4.20 g/dL      ALT (SGPT) 19 U/L      AST (SGOT) 14 U/L      Alkaline  Phosphatase 67 U/L      Total Bilirubin 0.3 mg/dL      eGFR Non African Amer 92 mL/min/1.73      Globulin 2.7 gm/dL      A/G Ratio 1.6 g/dL      BUN/Creatinine Ratio 16.7     Anion Gap 10.7 mmol/L     Lipase [[id number]]  (Normal) Collected:  09/08/18 1900    Specimen:  Blood Updated:  09/08/18 1937     Lipase 21 U/L     CBC Auto Differential [[id number]]  (Abnormal) Collected:  09/08/18 1900    Specimen:  Blood Updated:  09/08/18 1919     WBC 7.17 10*3/mm3      RBC 4.54 10*6/mm3      Hemoglobin 14.2 g/dL      Hematocrit 42.0 %      MCV 92.5 fL      MCH 31.3 pg      MCHC 33.8 g/dL      RDW 13.8 (H) %      RDW-SD 46.6 fl      MPV 11.2 fL      Platelets 211 10*3/mm3      Neutrophil % 55.4 %      Lymphocyte % 33.2 %      Monocyte % 10.3 %      Eosinophil % 1.0 %      Basophil % 0.1 %      Immature Grans % 0.1 %      Neutrophils, Absolute 3.97 10*3/mm3      Lymphocytes, Absolute 2.38 10*3/mm3      Monocytes, Absolute 0.74 10*3/mm3      Eosinophils, Absolute 0.07 10*3/mm3      Basophils, Absolute 0.01 10*3/mm3      Immature Grans, Absolute 0.01 10*3/mm3           I ordered the above labs and reviewed the results    RADIOLOGY  CT Abdomen Pelvis With Contrast   Preliminary Result   1.  Abnormal examination with small bowel obstruction caused by a   midline epigastric herniation of a distal jejunal bowel loop.   2.  Small lateral hernia. Postcholecystectomy. 0.8 cm right renal cyst.   3.  2.3 cm right ovarian cyst, six-week follow-up ultrasound is   recommended.       Findings called to Dr. Hess, 8:56 PM.                   I ordered the above noted radiological studies. Interpreted by radiologist. Discussed with radiologist (Dr. Vanegas). Reviewed by me in PACS.       PROCEDURES  Procedures      PROGRESS AND CONSULTS   6:48 PM  Labs, CT abd pelvis, Zofran, and Morphine ordered for abd pain.     9:01 PM  Discussed CT with Dr. Vanegas, radiology, which shows a bowel obstruction secondary to a jejunual loop in hernia.  Consult  placed to surgery.    9:03 PM  Rechecked pt who is resting in NAD. Informed pt of need for surgery. Pt understands and agrees with the plan, all questions answered. Last PO was 6:30-7AM.    9:05 PM  Discussed pt case with Dr. Diaz, surgery, how agrees to admit. He would like an NG tube placed.        MEDICAL DECISION MAKING  Results were reviewed/discussed with the patient and they were also made aware of online access. Pt also made aware that some labs, such as cultures, will not be resulted during ER visit and follow up with PMD is necessary.     MDM  Number of Diagnoses or Management Options     Amount and/or Complexity of Data Reviewed  Clinical lab tests: reviewed and ordered (Glucose-124  WBC-7.17)  Tests in the radiology section of CPT®: reviewed and ordered (CT abd pelvis-bowel obstruction secondary to a jejunual loop in hernia)  Discussion of test results with the performing providers: yes (Dr. Vanegas, radiology)  Decide to obtain previous medical records or to obtain history from someone other than the patient: yes  Review and summarize past medical records: yes  Discuss the patient with other providers: (Dr. Almaraz, surgery)           DIAGNOSIS  Final diagnoses:   Incarcerated hernia   SBO (small bowel obstruction)       DISPOSITION  ADMISSION    Discussed treatment plan and reason for admission with pt/family and admitting physician.  Pt/family voiced understanding of the plan for admission for further testing/treatment as needed.         Latest Documented Vital Signs:  As of 9:14 PM  BP- 153/84 HR- (!) 121 Temp- 98.6 °F (37 °C) O2 sat- 93%    --  Documentation assistance provided by coty Buck for Dr. Hess.  Information recorded by the coty was done at my direction and has been verified and validated by me.          Kaylee Buck  09/08/18 1896       Kentrell Hess MD  09/08/18 3368

## 2018-09-08 NOTE — ED TRIAGE NOTES
Pt was diagnosed with a hernia about a year ago. Pt reports today she noticed the area was harder and that she had increased pain.  Pt denies n/v/d

## 2018-09-08 NOTE — ED TRIAGE NOTES
Pt reports abd. Hernia was at work states she was lifting something and thinks she made it worse. Reports increased abd. Pain

## 2018-09-09 VITALS
SYSTOLIC BLOOD PRESSURE: 139 MMHG | BODY MASS INDEX: 30.29 KG/M2 | HEIGHT: 67 IN | OXYGEN SATURATION: 93 % | TEMPERATURE: 97.4 F | WEIGHT: 193 LBS | DIASTOLIC BLOOD PRESSURE: 75 MMHG | HEART RATE: 84 BPM | RESPIRATION RATE: 16 BRPM

## 2018-09-09 PROBLEM — K46.0 INCARCERATED HERNIA: Status: RESOLVED | Noted: 2018-09-08 | Resolved: 2018-09-09

## 2018-09-09 LAB
ANION GAP SERPL CALCULATED.3IONS-SCNC: 10.6 MMOL/L
BASOPHILS # BLD AUTO: 0.01 10*3/MM3 (ref 0–0.2)
BASOPHILS NFR BLD AUTO: 0.1 % (ref 0–1.5)
BUN BLD-MCNC: 9 MG/DL (ref 6–20)
BUN/CREAT SERPL: 15.3 (ref 7–25)
CALCIUM SPEC-SCNC: 8 MG/DL (ref 8.6–10.5)
CHLORIDE SERPL-SCNC: 108 MMOL/L (ref 98–107)
CO2 SERPL-SCNC: 22.4 MMOL/L (ref 22–29)
CREAT BLD-MCNC: 0.59 MG/DL (ref 0.57–1)
DEPRECATED RDW RBC AUTO: 50 FL (ref 37–54)
EOSINOPHIL # BLD AUTO: 0.09 10*3/MM3 (ref 0–0.7)
EOSINOPHIL NFR BLD AUTO: 0.9 % (ref 0.3–6.2)
ERYTHROCYTE [DISTWIDTH] IN BLOOD BY AUTOMATED COUNT: 13.9 % (ref 11.7–13)
GFR SERPL CREATININE-BSD FRML MDRD: 104 ML/MIN/1.73
GLUCOSE BLD-MCNC: 133 MG/DL (ref 65–99)
HCT VFR BLD AUTO: 40.3 % (ref 35.6–45.5)
HGB BLD-MCNC: 12.6 G/DL (ref 11.9–15.5)
IMM GRANULOCYTES # BLD: 0.02 10*3/MM3 (ref 0–0.03)
IMM GRANULOCYTES NFR BLD: 0.2 % (ref 0–0.5)
LYMPHOCYTES # BLD AUTO: 2.07 10*3/MM3 (ref 0.9–4.8)
LYMPHOCYTES NFR BLD AUTO: 20.9 % (ref 19.6–45.3)
MCH RBC QN AUTO: 30.4 PG (ref 26.9–32)
MCHC RBC AUTO-ENTMCNC: 31.3 G/DL (ref 32.4–36.3)
MCV RBC AUTO: 97.1 FL (ref 80.5–98.2)
MONOCYTES # BLD AUTO: 0.95 10*3/MM3 (ref 0.2–1.2)
MONOCYTES NFR BLD AUTO: 9.6 % (ref 5–12)
NEUTROPHILS # BLD AUTO: 6.75 10*3/MM3 (ref 1.9–8.1)
NEUTROPHILS NFR BLD AUTO: 68.3 % (ref 42.7–76)
PLATELET # BLD AUTO: 178 10*3/MM3 (ref 140–500)
PMV BLD AUTO: 11.2 FL (ref 6–12)
POTASSIUM BLD-SCNC: 3.7 MMOL/L (ref 3.5–5.2)
RBC # BLD AUTO: 4.15 10*6/MM3 (ref 3.9–5.2)
SODIUM BLD-SCNC: 141 MMOL/L (ref 136–145)
WBC NRBC COR # BLD: 9.89 10*3/MM3 (ref 4.5–10.7)

## 2018-09-09 PROCEDURE — 80048 BASIC METABOLIC PNL TOTAL CA: CPT | Performed by: SURGERY

## 2018-09-09 PROCEDURE — 85025 COMPLETE CBC W/AUTO DIFF WBC: CPT | Performed by: SURGERY

## 2018-09-09 PROCEDURE — 25010000002 ENOXAPARIN PER 10 MG: Performed by: SURGERY

## 2018-09-09 PROCEDURE — 25010000002 FENTANYL CITRATE (PF) 100 MCG/2ML SOLUTION: Performed by: ANESTHESIOLOGY

## 2018-09-09 PROCEDURE — 99024 POSTOP FOLLOW-UP VISIT: CPT | Performed by: SURGERY

## 2018-09-09 RX ORDER — SODIUM CHLORIDE 0.9 % (FLUSH) 0.9 %
1-10 SYRINGE (ML) INJECTION AS NEEDED
Status: DISCONTINUED | OUTPATIENT
Start: 2018-09-09 | End: 2018-09-09 | Stop reason: HOSPADM

## 2018-09-09 RX ORDER — HYDROCODONE BITARTRATE AND ACETAMINOPHEN 7.5; 325 MG/1; MG/1
1 TABLET ORAL ONCE AS NEEDED
Status: DISCONTINUED | OUTPATIENT
Start: 2018-09-09 | End: 2018-09-09 | Stop reason: HOSPADM

## 2018-09-09 RX ORDER — LABETALOL HYDROCHLORIDE 5 MG/ML
5 INJECTION, SOLUTION INTRAVENOUS
Status: DISCONTINUED | OUTPATIENT
Start: 2018-09-09 | End: 2018-09-09 | Stop reason: HOSPADM

## 2018-09-09 RX ORDER — PROMETHAZINE HYDROCHLORIDE 25 MG/1
12.5 SUPPOSITORY RECTAL EVERY 6 HOURS PRN
Status: DISCONTINUED | OUTPATIENT
Start: 2018-09-09 | End: 2018-09-09 | Stop reason: HOSPADM

## 2018-09-09 RX ORDER — ACETAMINOPHEN 325 MG/1
650 TABLET ORAL EVERY 4 HOURS PRN
Status: DISCONTINUED | OUTPATIENT
Start: 2018-09-09 | End: 2018-09-09 | Stop reason: HOSPADM

## 2018-09-09 RX ORDER — NALOXONE HCL 0.4 MG/ML
0.4 VIAL (ML) INJECTION
Status: DISCONTINUED | OUTPATIENT
Start: 2018-09-09 | End: 2018-09-09 | Stop reason: HOSPADM

## 2018-09-09 RX ORDER — OXYCODONE AND ACETAMINOPHEN 7.5; 325 MG/1; MG/1
1 TABLET ORAL ONCE AS NEEDED
Status: DISCONTINUED | OUTPATIENT
Start: 2018-09-09 | End: 2018-09-09 | Stop reason: HOSPADM

## 2018-09-09 RX ORDER — PROMETHAZINE HYDROCHLORIDE 12.5 MG/1
12.5 TABLET ORAL EVERY 6 HOURS PRN
Qty: 10 TABLET | Refills: 0 | Status: SHIPPED | OUTPATIENT
Start: 2018-09-09 | End: 2018-09-25

## 2018-09-09 RX ORDER — NAPROXEN 250 MG/1
250 TABLET ORAL 2 TIMES DAILY PRN
Status: DISCONTINUED | OUTPATIENT
Start: 2018-09-09 | End: 2018-09-09 | Stop reason: HOSPADM

## 2018-09-09 RX ORDER — HYDROCODONE BITARTRATE AND ACETAMINOPHEN 5; 325 MG/1; MG/1
1 TABLET ORAL EVERY 4 HOURS PRN
Status: DISCONTINUED | OUTPATIENT
Start: 2018-09-09 | End: 2018-09-09 | Stop reason: HOSPADM

## 2018-09-09 RX ORDER — FLUMAZENIL 0.1 MG/ML
0.2 INJECTION INTRAVENOUS AS NEEDED
Status: DISCONTINUED | OUTPATIENT
Start: 2018-09-09 | End: 2018-09-09 | Stop reason: HOSPADM

## 2018-09-09 RX ORDER — FENTANYL CITRATE 50 UG/ML
50 INJECTION, SOLUTION INTRAMUSCULAR; INTRAVENOUS
Status: DISCONTINUED | OUTPATIENT
Start: 2018-09-09 | End: 2018-09-09 | Stop reason: HOSPADM

## 2018-09-09 RX ORDER — PROMETHAZINE HYDROCHLORIDE 25 MG/1
25 SUPPOSITORY RECTAL ONCE AS NEEDED
Status: DISCONTINUED | OUTPATIENT
Start: 2018-09-09 | End: 2018-09-09 | Stop reason: HOSPADM

## 2018-09-09 RX ORDER — VANCOMYCIN HYDROCHLORIDE 500 MG/10ML
INJECTION, POWDER, LYOPHILIZED, FOR SOLUTION INTRAVENOUS AS NEEDED
Status: DISCONTINUED | OUTPATIENT
Start: 2018-09-08 | End: 2018-09-09 | Stop reason: SURG

## 2018-09-09 RX ORDER — PROMETHAZINE HYDROCHLORIDE 25 MG/ML
12.5 INJECTION, SOLUTION INTRAMUSCULAR; INTRAVENOUS ONCE AS NEEDED
Status: DISCONTINUED | OUTPATIENT
Start: 2018-09-09 | End: 2018-09-09 | Stop reason: HOSPADM

## 2018-09-09 RX ORDER — PROMETHAZINE HYDROCHLORIDE 25 MG/1
12.5 TABLET ORAL ONCE AS NEEDED
Status: DISCONTINUED | OUTPATIENT
Start: 2018-09-09 | End: 2018-09-09 | Stop reason: HOSPADM

## 2018-09-09 RX ORDER — PROMETHAZINE HYDROCHLORIDE 25 MG/1
25 TABLET ORAL ONCE AS NEEDED
Status: DISCONTINUED | OUTPATIENT
Start: 2018-09-09 | End: 2018-09-09 | Stop reason: HOSPADM

## 2018-09-09 RX ORDER — DIPHENHYDRAMINE HYDROCHLORIDE 50 MG/ML
12.5 INJECTION INTRAMUSCULAR; INTRAVENOUS
Status: DISCONTINUED | OUTPATIENT
Start: 2018-09-09 | End: 2018-09-09 | Stop reason: HOSPADM

## 2018-09-09 RX ORDER — SODIUM CHLORIDE, SODIUM LACTATE, POTASSIUM CHLORIDE, CALCIUM CHLORIDE 600; 310; 30; 20 MG/100ML; MG/100ML; MG/100ML; MG/100ML
100 INJECTION, SOLUTION INTRAVENOUS CONTINUOUS
Status: DISCONTINUED | OUTPATIENT
Start: 2018-09-09 | End: 2018-09-09 | Stop reason: HOSPADM

## 2018-09-09 RX ORDER — PROMETHAZINE HYDROCHLORIDE 25 MG/ML
12.5 INJECTION, SOLUTION INTRAMUSCULAR; INTRAVENOUS EVERY 6 HOURS PRN
Status: DISCONTINUED | OUTPATIENT
Start: 2018-09-09 | End: 2018-09-09 | Stop reason: HOSPADM

## 2018-09-09 RX ORDER — ROSUVASTATIN CALCIUM 5 MG/1
5 TABLET, COATED ORAL DAILY
Status: DISCONTINUED | OUTPATIENT
Start: 2018-09-09 | End: 2018-09-09 | Stop reason: HOSPADM

## 2018-09-09 RX ORDER — PYRAZINAMIDE 500 MG/1
1 TABLET ORAL EVERY 6 HOURS PRN
Qty: 30 TABLET | Refills: 0 | Status: SHIPPED | OUTPATIENT
Start: 2018-09-09 | End: 2018-09-25

## 2018-09-09 RX ORDER — NALOXONE HCL 0.4 MG/ML
0.2 VIAL (ML) INJECTION AS NEEDED
Status: DISCONTINUED | OUTPATIENT
Start: 2018-09-09 | End: 2018-09-09 | Stop reason: HOSPADM

## 2018-09-09 RX ORDER — ONDANSETRON 2 MG/ML
4 INJECTION INTRAMUSCULAR; INTRAVENOUS ONCE AS NEEDED
Status: DISCONTINUED | OUTPATIENT
Start: 2018-09-09 | End: 2018-09-09 | Stop reason: HOSPADM

## 2018-09-09 RX ORDER — PROMETHAZINE HYDROCHLORIDE 12.5 MG/1
12.5 TABLET ORAL EVERY 6 HOURS PRN
Status: DISCONTINUED | OUTPATIENT
Start: 2018-09-09 | End: 2018-09-09 | Stop reason: HOSPADM

## 2018-09-09 RX ORDER — EPHEDRINE SULFATE 50 MG/ML
5 INJECTION, SOLUTION INTRAVENOUS ONCE AS NEEDED
Status: DISCONTINUED | OUTPATIENT
Start: 2018-09-09 | End: 2018-09-09 | Stop reason: HOSPADM

## 2018-09-09 RX ORDER — NICOTINE 21 MG/24HR
1 PATCH, TRANSDERMAL 24 HOURS TRANSDERMAL EVERY 24 HOURS
Status: DISCONTINUED | OUTPATIENT
Start: 2018-09-09 | End: 2018-09-09 | Stop reason: HOSPADM

## 2018-09-09 RX ORDER — AMOXICILLIN 250 MG
2 CAPSULE ORAL DAILY PRN
Qty: 30 TABLET | Refills: 1 | Status: SHIPPED | OUTPATIENT
Start: 2018-09-09 | End: 2018-10-01

## 2018-09-09 RX ADMIN — HYDROCODONE BITARTRATE AND ACETAMINOPHEN 1 TABLET: 5; 325 TABLET ORAL at 04:01

## 2018-09-09 RX ADMIN — ENOXAPARIN SODIUM 40 MG: 40 INJECTION SUBCUTANEOUS at 08:25

## 2018-09-09 RX ADMIN — FENTANYL CITRATE 100 MCG: 50 INJECTION INTRAMUSCULAR; INTRAVENOUS at 00:11

## 2018-09-09 RX ADMIN — SODIUM CHLORIDE, POTASSIUM CHLORIDE, SODIUM LACTATE AND CALCIUM CHLORIDE 100 ML/HR: 600; 310; 30; 20 INJECTION, SOLUTION INTRAVENOUS at 08:25

## 2018-09-09 RX ADMIN — NICOTINE 1 PATCH: 21 PATCH, EXTENDED RELEASE TRANSDERMAL at 08:25

## 2018-09-09 NOTE — OP NOTE
PREOPERATIVE DIAGNOSIS:  Incarcerated supraumbilical incisional hernia   POSTOPERATIVE DIAGNOSIS: Same  PROCEDURE:  Open supraumbilical incisional hernia repair with mesh placement  SURGEON/STAFF:  AILYN Diaz  ANESTHESIA:  General.   BLOOD LOSS: minimal  COUNTS:  Needle and sponge count correct.  SPECIMENS:  None  Mesh: Ventralex ST  6.4 cm circular patch    INDICATIONS FOR OPERATION:  Mariana Loya is a 58 y.o. year old female who presented to the emergency room with abdominal pain and a bulge in the supraumbilical area.  She was found to have an incarcerated supraumbilical incisional hernia.     The risks and benefits of open, conservative and laparoscopic management were discussed at length and in detail with the patient.  Discussed with her about the need of possible mesh in case fascia is thinned and weak.  Discussed with her about the risk of bleeding, infections, hernia recurrence and mesh infections.  Patient verbalized understanding and agree with the plan    OPERATION:  The patient was brought to the operating room in stable condition.   Preoperative antibiotics were given and sequential compression devices were applied.  At this time, the patient was laid supine on the operating room table.  General anesthesia was induced by the Anesthesia service without difficulty.  The patient's abdomen was prepped and draped in the usual sterile fashion.      At this time, half percent Marcaine with epinephrine was injected in the supraumbilical area. A vertical incision was performed supraumbilically. Dissection was carried down to the muscular fascia.  The hernia sac was recognized and he was dissected circumferentially from its surrounding attachments.  A loop of small bowel was found to be incarcerated at the level of the fascia.  This was easily reduced into the abdominal cavity.  Dissection of the hernia sac was performed with Bovie electrocautery and blunt dissection.  The hernia sac was detached from the fascia.   The abdominal fascia was recognized and the defect was find to be 1 x 1 cm.  The fascia was extremely thin secondary to rectus muscle diastases.  I continued the dissection of the hernia sac circumferentially at the level of the muscular fascia.  A retro-fascial plane was created.  The  hernia contents were reduced into the abdominal cavity.  At this point a Ventralex ST 6.4 cm was brought to the operating room.  The hernia patch was positioned in the intraperitoneal space with adequate overlap of the defect.  The muscular fascia was then dissected circumferentially to obtain at least 2 cm of healthy fascia.  The hernia defect was then closed with figure of eight  #1 PDS sutures. The medial suture incorporated the mesh straps. The subcutaneous tissue was closed in 2 layers with 3-0 Vicryl and 4-0 Monocryl.  The wound was closed with surgical glue.     The patient tolerated the procedure well.  Instrument and sponge count were correct.    The patient was extubated in the recovery room in stable condition.  I, the attending surgeon, performed the entire operation.    Kody Diaz MD  General, Minimally Invasive and Endoscopic Surgery  [facility]

## 2018-09-09 NOTE — ANESTHESIA PROCEDURE NOTES
Airway  Urgency: elective    Airway not difficult    General Information and Staff    Patient location during procedure: OR  Anesthesiologist: STELLA SCHAFFER    Indications and Patient Condition  Indications: operative anesthetic; GETA.    Preoxygenated: yes  Mask difficulty assessment: 1 - vent by mask    Final Airway Details  Final airway type: endotracheal airway      Successful airway: ETT  Cuffed: yes   Successful intubation technique: direct laryngoscopy  Endotracheal tube insertion site: oral  Blade: Barajas  Blade size: 2  ETT size: 7.0 mm  Cormack-Lehane Classification: grade I - full view of glottis  Placement verified by: chest auscultation and capnometry   Cuff volume (mL): 6  Measured from: gums  ETT to gums (cm): 21  Number of attempts at approach: 1

## 2018-09-09 NOTE — ANESTHESIA POSTPROCEDURE EVALUATION
"Patient: Mariana Loya    Procedure Summary     Date:  09/08/18 Room / Location:  [facility] OR  / [facility] MAIN OR    Anesthesia Start:  2300 Anesthesia Stop:  09/09/18 0027    Procedure:  incarcerated /INCISIONAL HERNIA REPAIR with mesh (N/A Abdomen) Diagnosis:      Surgeon:  Kody Diaz MD Provider:  Isaias Garcia MD    Anesthesia Type:  general ASA Status:  3 - Emergent          Anesthesia Type: general  Last vitals  BP   140/84 (09/09/18 0129)   Temp   36.4 °C (97.6 °F) (09/09/18 0129)   Pulse   81 (09/09/18 0129)   Resp   16 (09/09/18 0129)     SpO2   95 % (09/09/18 0129)     Post Anesthesia Care and Evaluation    Patient location during evaluation: bedside  Patient participation: complete - patient participated  Level of consciousness: awake  Pain score: 2  Pain management: adequate  Airway patency: patent  Anesthetic complications: No anesthetic complications  PONV Status: none  Cardiovascular status: acceptable  Respiratory status: acceptable  Hydration status: acceptable    Comments: /84 (BP Location: Left arm, Patient Position: Lying)   Pulse 81   Temp 36.4 °C (97.6 °F) (Oral)   Resp 16   Ht 170.2 cm (67\")   Wt 87.5 kg (193 lb)   SpO2 95%   BMI 30.23 kg/m²         "

## 2018-09-09 NOTE — DISCHARGE SUMMARY
Admission date: 9/8/2018   Discharge date: 9/9/18.     Admission diagnosis: SBO (small bowel obstruction) [K56.609]  Incarcerated hernia [K46.0]     Discharge diagnosis: Incarcerated supraumbilical incisional hernia     Procedure: Open incisional hernia repair with mesh placement     Meds:      Your medication list      START taking these medications      Instructions Last Dose Given Next Dose Due   acetaminophen-codeine 300-30 MG per tablet  Commonly known as:  TYLENOL/CODEINE #3      Take 1 tablet by mouth Every 6 (Six) Hours As Needed for Moderate Pain  for up to 30 doses.       promethazine 12.5 MG tablet  Commonly known as:  PHENERGAN      Take 1 tablet by mouth Every 6 (Six) Hours As Needed for Nausea or Vomiting for up to 10 doses.       senna-docusate 8.6-50 MG per tablet  Commonly known as:  PERICOLACE      Take 2 tablets by mouth Daily As Needed for Constipation.          CONTINUE taking these medications      Instructions Last Dose Given Next Dose Due   clotrimazole-betamethasone 1-0.05 % cream  Commonly known as:  LOTRISONE      Apply  topically 2 (Two) Times a Day.       naproxen sodium 220 MG tablet  Commonly known as:  ALEVE      Take 220 mg by mouth 2 (Two) Times a Day As Needed for Mild Pain (1-3).       nicotine 21 MG/24HR patch  Commonly known as:  NICODERM CQ      Place 1 patch on the skin Daily.       rosuvastatin 10 MG tablet  Commonly known as:  CRESTOR      Initially take half tablet daily if tolerated increase to whole tablet daily             Where to Get Your Medications      These medications were sent to [id number] - [place], [place] - [address] - [phone] [place] [id number] [address] [address]    Phone:  [phone]   · promethazine 12.5 MG tablet  · senna-docusate 8.6-50 MG per tablet     You can get these medications from any pharmacy    Bring a paper prescription for each of these medications  · acetaminophen-codeine 300-30 MG per  tablet         Instructions:    - No heavy lifting of more than 15 lb for 6 weeks. Can start doing aerobic type exercise in 4 weeks.   - No driving while taking pain medications  - Take medications as prescribed.   - Can shower, no bath tub    Follow up in my office in 2 weeks     Kody Diaz MD  General, Minimally Invasive and Endoscopic Surgery  [facility]

## 2018-09-09 NOTE — ANESTHESIA PREPROCEDURE EVALUATION
Anesthesia Evaluation     Patient summary reviewed and Nursing notes reviewed   no history of anesthetic complications:  NPO Solid Status: Waived due to emergency  NPO Liquid Status: Waived due to emergency           Airway   Mallampati: II  TM distance: >3 FB  Neck ROM: full  Dental - normal exam     Pulmonary - normal exam   (+) a smoker Current Smoked day of surgery,   Cardiovascular - normal exam    (+) hyperlipidemia,       Neuro/Psych- negative ROS  GI/Hepatic/Renal/Endo    (+) obesity,       Musculoskeletal (-) negative ROS    Abdominal    Substance History      OB/GYN          Other                        Anesthesia Plan    ASA 3 - emergent     general     Anesthetic plan, all risks, benefits, and alternatives have been provided, discussed and informed consent has been obtained with: patient.

## 2018-09-09 NOTE — PLAN OF CARE
Problem: Patient Care Overview  Goal: Plan of Care Review  Outcome: Ongoing (interventions implemented as appropriate)   09/09/18 6100   Coping/Psychosocial   Plan of Care Reviewed With patient;spouse   Plan of Care Review   Progress improving   OTHER   Outcome Summary Admitted from PACU, came to ER with abdominal pain, had surgery for encarcerated hernia. Ambulated to the bathroom, pain controlled with norco, anticipating discharge today. Continue to monitor.       Problem: Pain, Acute (Adult)  Goal: Identify Related Risk Factors and Signs and Symptoms  Outcome: Ongoing (interventions implemented as appropriate)    Goal: Acceptable Pain Control/Comfort Level  Outcome: Ongoing (interventions implemented as appropriate)

## 2018-09-09 NOTE — PROGRESS NOTES
POD# 1 s/p open incisional hernia repair with mesh for incarcerated bowel    S: No issues, feeling great. Tolerating regular diet    O:   Vitals:    09/09/18 0110 09/09/18 0129 09/09/18 0455 09/09/18 0747   BP: 135/66 140/84 133/72 139/75   BP Location: Left arm Left arm Left arm Left arm   Patient Position: Lying Lying Lying Lying   Pulse: 83 81 91 84   Resp: 18 16 16 16   Temp: 97.8 °F (36.6 °C) 97.6 °F (36.4 °C) 97.5 °F (36.4 °C) 97.4 °F (36.3 °C)   TempSrc: Oral Oral Oral Oral   SpO2: 97% 95% 93% 93%   Weight:       Height:         Abdomen soft, mildly tender over incision, Incision c/d/i. No signs of infection, partially reducible subxiphoid hernia, mildly tender    POD# 1 s/p open incisional hernia repair with mesh for incarcerated bowel, doing great, tolerating diet    - Home today  - Counseled about smoking cessation  - No heavy lifting  - Follow up in my office in 2 weeks    Kody Diaz MD, FACS  General, Minimally Invasive and Endoscopic Surgery  [facility]    [address], [address]  [place], [place], [address]  P: [phone]  F: [phone]

## 2018-09-09 NOTE — H&P
Admit H&P    Admiting Physician: Kody Diaz MD    Reason for Admission: Incarcerated incisional hernia    Chief Complaint   Patient presents with   • Abdominal Pain       HPI:   The patient is a very pleasant 58 y.o. years old female that presented to the hospital with severe abdominal pain that started this afternoon.  The patient reports lifting heavy stuff at work today and she developed sudden onset of supraumbilical abdominal pain associated with a bulge.  She described the pain as sharp located over the supraumbilical area that did not radiate, the intensity of the pain was 7 out of 10 and improved with morphine at the emergency room.  She denies any nausea or vomiting or any obstructing signs.  She has history of subxiphoid hernia that has been mildly symptomatic.  Her symptoms included intermittent episodes of sharp pain.  She saw Dr. Levi on May 2017.  At that time she was recommended to undergo open incisional hernia repair with possible mesh placement.  Patient wanted to wait until January of next year because of financial issues and never had it repaired.  She denies any fevers chills.  No other complaints.  CT scan of the abdomen and pelvis was performed in the emergency room that show evidence of incarcerated supraumbilical hernia with a loop of small bowel.      Past Medical History:   Diagnosis Date   • Colon polyps 10/07/2013    Ascending colon biopsy: fragments of tubular adenoma with low grade dysplasia   • Diverticulosis 10/07/2013    Found in Colonoscopy done by Dr. Eddi Anne   • Hyperlipidemia        Past Surgical History:   Procedure Laterality Date   • COLONOSCOPY W/ POLYPECTOMY N/A 10/07/2013    One 10 mm polyp in the proximal ascending colon: resected and retrieved, diverticulosis in the sigmoid colon and in the descending colon-Dr. Eddi Anne    • KNEE ARTHROSCOPY W/ MENISCECTOMY Left 11/17/2006    Left knee arthroscopy with complete lateral meniscectomy-Dr. Ming Thorpe    • LAPAROSCOPIC CHOLECYSTECTOMY W/ CHOLANGIOGRAPHY N/A 11/14/2001    Dr. Lucy Levi   • TONSILLECTOMY AND ADENOIDECTOMY N/A    • WISDOM TOOTH EXTRACTION N/A 1980    Dr. Wong         Current Facility-Administered Medications:   •  fentaNYL citrate (PF) (SUBLIMAZE) 100 MCG/2ML injection  - ADS Override Pull, , , ,   •  fentaNYL citrate (PF) (SUBLIMAZE) injection 50 mcg, 50 mcg, Intravenous, Q10 Min PRN, Isaias Garcia MD  •  lactated ringers infusion, 9 mL/hr, Intravenous, Continuous, Isaias Garcia MD, Last Rate: 9 mL/hr at 09/08/18 2247, 9 mL/hr at 09/08/18 2247  •  lidocaine PF 1% (XYLOCAINE) injection 0.5 mL, 0.5 mL, Injection, Once PRN, Isaias Garcia MD  •  midazolam (VERSED) injection 1 mg, 1 mg, Intravenous, Q5 Min PRN **OR** midazolam (VERSED) injection 2 mg, 2 mg, Intravenous, Q5 Min PRN, Isaias Garcia MD  •  sodium chloride 0.9 % flush 1-10 mL, 1-10 mL, Intravenous, PRN, Isaias Garcia MD  •  Insert peripheral IV, , , Once **AND** [MAR Hold] sodium chloride 0.9 % flush 10 mL, 10 mL, Intravenous, PRN, Kentrell Hess MD  •  sodium chloride 0.9 % infusion, 125 mL/hr, Intravenous, Continuous, Kentrell Hess MD, Last Rate: 125 mL/hr at 09/08/18 1905, 125 mL/hr at 09/08/18 1905    No Known Allergies    Social History     Social History   • Marital status:      Spouse name: Orlando   • Number of children: N/A   • Years of education: N/A     Occupational History   • Not on file.     Social History Main Topics   • Smoking status: Current Every Day Smoker     Packs/day: 1.00     Years: 44.00   • Smokeless tobacco: Not on file   • Alcohol use No   • Drug use: No   • Sexual activity: Not on file     Other Topics Concern   • Not on file     Social History Narrative   • No narrative on file       Family History   Problem Relation Age of Onset   • Diabetes Mother    • Heart disease Mother         chf   • Heart disease Father    • No Known Problems Sister    • No Known  Problems Brother    • No Known Problems Daughter    • No Known Problems Son    • No Known Problems Maternal Grandmother    • No Known Problems Paternal Grandmother    • No Known Problems Maternal Aunt    • No Known Problems Paternal Aunt    • BRCA 1/2 Neg Hx    • Breast cancer Neg Hx    • Colon cancer Neg Hx    • Endometrial cancer Neg Hx    • Ovarian cancer Neg Hx        ROS:   Constitutional: denies any weight changes, fatigue or weakness.  Eyes: : denies blurred or double vision  Cardiovascular: denies chest pain, palpitations, edemas.  Respiratory: denies cough, sputum, SOB.  Gastrointestinal: denies N&V,diarrhea, constipation.  Genitourinary: denies dysuria, frequency.  Endocrine: denies cold intolerance, lethargy and flushing.  Hem: denies excessive bruising and postop bleeding.  Musculoskeletal: denies weakness, joint swelling, pain or stiffness.  Neuro: denies seizures, CVA, paresthesia, or peripheral neuropathy.   Skin: denies change in nevi, rashes, masses.    Physical Exam:   Vitals:    09/08/18 2230   BP: 167/94   Pulse: 98   Resp: 18   Temp: 97.8 °F (36.6 °C)   SpO2: 95%     GENERAL:alert, well appearing, and in no distress and oriented to person, place, and time  HEENT: normochephalic, atraumatic, no scleral icterus moist mucous membranes.  NG in place with clear fluid  NECK: Supple there is no thyromegaly or lymphadenopathy  CHEST: clear to auscultation, no wheezes, rales or rhonchi, symmetric air entry  CARDIAC: regular rate and rhythm    ABDOMEN: soft, nondistended, no masses or organomegaly.  Moderate size, incarcerated supraumbilical area, tender to palpation, no erythema skin color changes.  Small partially reducible subxiphoid incisional hernia.  No rebound or guarding and there is no peritoneal signs.  Evidence of prior laparoscopic cholecystectomy repair  EXTREMITIES: no cyanosis, clubbing or edema    NEURO: alert and oriented, normal speech, cranial nerves 2-12 grossly intact, no focal  deficits   SKIN: Moist, warm, no rashes.    Diagnostic workup:   CT scan abdomen and pelvis was review by me.  There is evidence of incarcerated supraumbilical hernia with a loop of small bowel.  There is no signs of ischemia but there is evidence of bowel obstruction.  There is a small fat-containing subxiphoid incisional hernia    CBC within normal limits  CMP within normal limits  Lactic acid 0.7    Assessment and plan:   The patient is a very pleasant 58 y.o. years old female with an incarcerated and strangulated supraumbilical incisional hernia with small bowel causing small bowel obstruction.  She also has mildly symptomatic subxiphoid incisional hernia with incarcerated fat.  I have discussed with the patient about treatment options and type of repair.  Patient is concerned about mesh used because of large amount of advertising and on to be about mesh recalls and suction.  Have discussed with the patient about the possibility of performing incisional hernia repair with a without mesh.  Discussed with her that the risk of recurrence without mesh is almost double than the recurrence with mesh.  Discussed with her that although she is a risk of complications of bleeding, infections, mesh infections and mesh recurrence I think that if there is no evidence of bowel necrosis then mesh repair will be a better long-term option.  Patient's also an increase risk of complication due to chronic cigarette use.  After an extensive discussion with the patient as well as her  the patient has agreed to undergo open incisional hernia repair with possible mesh placement with possible small bowel resection     - Plan for emergent open incisional hernia repair with possible mesh placement with possible small bowel resection    Case was discussed with patient.    I counseled the patient about smoking cessation.     Risk and benefits of the current recommended treatment were explained to the patient that had time to ask  questions that where answered, verbalized understanding and agreed with the plan.     Kody Diaz MD  General, Minimally Invasive and Endoscopic Surgery  [facility]    [address], [address]  [place], [place], [address]  P: [phone]  F: [phone]

## 2018-09-09 NOTE — BRIEF OP NOTE
VENTRAL/INCISIONAL HERNIA REPAIR  Progress Note    Mariana Loya  9/8/2018    Pre-op Diagnosis:   Incarcerated incisional hernia       Post-Op Diagnosis Codes:  Same    Procedure/CPT® Codes:      Procedure(s):  incarcerated /INCISIONAL HERNIA REPAIR with mesh    Surgeon(s):  Kody Diaz MD    Anesthesia: General    Staff:   Circulator: Brigida Gordon RN  Scrub Person: Gisela Lowe    Estimated Blood Loss: minimal    Urine Voided: * No values recorded between 9/8/2018 11:00 PM and 9/9/2018 12:20 AM *    Specimens:                None      Drains:   NG/OG Tube Nasogastric 16 Fr Right nostril (Active)   Placement Verification Other (Comment) 9/8/2018  9:50 PM   Site Assessment Intact 9/8/2018 10:23 PM   Securement anchored to nostril center with adhesive device 9/8/2018 10:23 PM   Secured at (cm) 60 9/8/2018  9:50 PM   Status Clamped 9/8/2018 10:23 PM   Drainage Appearance Bile 9/8/2018  9:50 PM       Urethral Catheter Double-lumen 16 Fr. (Active)       Findings: Small fascial defect, viable small bowel, very thinned abdominal wall fascia.     Complications: None      Kody Diaz MD     Date: 9/9/2018  Time: 12:20 AM

## 2018-09-13 ENCOUNTER — TELEPHONE (OUTPATIENT)
Dept: OBSTETRICS AND GYNECOLOGY | Age: 58
End: 2018-09-13

## 2018-09-13 NOTE — TELEPHONE ENCOUNTER
Pt notified that no appt is available for Mammogram,  Pt says she will calll [facility] to susi a MG appt.

## 2018-09-18 ENCOUNTER — TELEPHONE (OUTPATIENT)
Dept: GASTROENTEROLOGY | Facility: CLINIC | Age: 58
End: 2018-09-18

## 2018-09-18 NOTE — TELEPHONE ENCOUNTER
----- Message from Niels Garber sent at 9/18/2018 10:01 AM EDT -----  Regarding: COLONOSCOPY  Contact: [phone]  PT DUE FOR 5 YR SCOPE. NO BLOOD THINNERS. WANTS TO DO OPEN ACCESS.   Returned patients call . She had emergency surgery on 9-8-18.she will see Dr. Diaz on 9-25-18. Will get clearance from him before we schedule colonoscopy did go ahead and send her Open Access paper work.

## 2018-09-24 ENCOUNTER — HOSPITAL ENCOUNTER (EMERGENCY)
Facility: HOSPITAL | Age: 58
Discharge: HOME | End: 2018-09-24
Attending: EMERGENCY MEDICINE | Admitting: EMERGENCY MEDICINE

## 2018-09-24 ENCOUNTER — APPOINTMENT (OUTPATIENT)
Dept: CARDIOLOGY | Facility: HOSPITAL | Age: 58
End: 2018-09-24

## 2018-09-24 VITALS
WEIGHT: 185 LBS | HEIGHT: 67 IN | BODY MASS INDEX: 29.03 KG/M2 | RESPIRATION RATE: 19 BRPM | TEMPERATURE: 99.6 F | HEART RATE: 91 BPM | DIASTOLIC BLOOD PRESSURE: 94 MMHG | OXYGEN SATURATION: 98 % | SYSTOLIC BLOOD PRESSURE: 137 MMHG

## 2018-09-24 DIAGNOSIS — I80.02 SUPERFICIAL THROMBOPHLEBITIS OF LEFT LEG: Primary | ICD-10-CM

## 2018-09-24 PROCEDURE — 99282 EMERGENCY DEPT VISIT SF MDM: CPT

## 2018-09-24 PROCEDURE — 93971 EXTREMITY STUDY: CPT

## 2018-09-24 RX ORDER — IBUPROFEN 800 MG/1
800 TABLET ORAL EVERY 8 HOURS PRN
Qty: 30 TABLET | Refills: 0 | Status: SHIPPED | OUTPATIENT
Start: 2018-09-24 | End: 2018-10-01

## 2018-09-25 ENCOUNTER — OFFICE VISIT (OUTPATIENT)
Dept: SURGERY | Facility: CLINIC | Age: 58
End: 2018-09-25

## 2018-09-25 DIAGNOSIS — I87.2 VENOUS INSUFFICIENCY: ICD-10-CM

## 2018-09-25 DIAGNOSIS — K43.2 INCISIONAL HERNIA, WITHOUT OBSTRUCTION OR GANGRENE: Primary | ICD-10-CM

## 2018-09-25 LAB
BH CV LOW VAS LEFT GREATER SAPH BK VESSEL: 1
BH CV LOW VAS LEFT SAPHENOFEMORAL JUNCTION SPONT: 1
BH CV LOW VAS LEFT VARICOSITY BK VESSEL: 1
BH CV LOWER VASCULAR LEFT COMMON FEMORAL AUGMENT: NORMAL
BH CV LOWER VASCULAR LEFT COMMON FEMORAL COMPETENT: NORMAL
BH CV LOWER VASCULAR LEFT COMMON FEMORAL COMPRESS: NORMAL
BH CV LOWER VASCULAR LEFT COMMON FEMORAL PHASIC: NORMAL
BH CV LOWER VASCULAR LEFT COMMON FEMORAL SPONT: NORMAL
BH CV LOWER VASCULAR LEFT DISTAL FEMORAL COMPRESS: NORMAL
BH CV LOWER VASCULAR LEFT GASTRONEMIUS COMPRESS: NORMAL
BH CV LOWER VASCULAR LEFT GREATER SAPH AK COMPRESS: NORMAL
BH CV LOWER VASCULAR LEFT GREATER SAPH BK COMPRESS: NORMAL
BH CV LOWER VASCULAR LEFT GREATER SAPH BK THROMBUS: NORMAL
BH CV LOWER VASCULAR LEFT LESSER SAPH COMPRESS: NORMAL
BH CV LOWER VASCULAR LEFT MID FEMORAL AUGMENT: NORMAL
BH CV LOWER VASCULAR LEFT MID FEMORAL COMPETENT: NORMAL
BH CV LOWER VASCULAR LEFT MID FEMORAL COMPRESS: NORMAL
BH CV LOWER VASCULAR LEFT MID FEMORAL PHASIC: NORMAL
BH CV LOWER VASCULAR LEFT MID FEMORAL SPONT: NORMAL
BH CV LOWER VASCULAR LEFT PERONEAL COMPRESS: NORMAL
BH CV LOWER VASCULAR LEFT POPLITEAL AUGMENT: NORMAL
BH CV LOWER VASCULAR LEFT POPLITEAL COMPETENT: NORMAL
BH CV LOWER VASCULAR LEFT POPLITEAL COMPRESS: NORMAL
BH CV LOWER VASCULAR LEFT POPLITEAL PHASIC: NORMAL
BH CV LOWER VASCULAR LEFT POPLITEAL SPONT: NORMAL
BH CV LOWER VASCULAR LEFT POSTERIOR TIBIAL COMPRESS: NORMAL
BH CV LOWER VASCULAR LEFT PROXIMAL FEMORAL COMPRESS: NORMAL
BH CV LOWER VASCULAR LEFT SAPHENOFEMORAL JUNCTION AUGMENT: NORMAL
BH CV LOWER VASCULAR LEFT SAPHENOFEMORAL JUNCTION COMPETENT: NORMAL
BH CV LOWER VASCULAR LEFT SAPHENOFEMORAL JUNCTION COMPRESS: NORMAL
BH CV LOWER VASCULAR LEFT SAPHENOFEMORAL JUNCTION PHASIC: NORMAL
BH CV LOWER VASCULAR LEFT SAPHENOFEMORAL JUNCTION SPONT: NORMAL
BH CV LOWER VASCULAR LEFT VARICOSITY BK COMPRESS: NORMAL
BH CV LOWER VASCULAR LEFT VARICOSITY BK THROMBUS: NORMAL
BH CV LOWER VASCULAR RIGHT COMMON FEMORAL AUGMENT: NORMAL
BH CV LOWER VASCULAR RIGHT COMMON FEMORAL COMPETENT: NORMAL
BH CV LOWER VASCULAR RIGHT COMMON FEMORAL COMPRESS: NORMAL
BH CV LOWER VASCULAR RIGHT COMMON FEMORAL PHASIC: NORMAL
BH CV LOWER VASCULAR RIGHT COMMON FEMORAL SPONT: NORMAL

## 2018-09-25 PROCEDURE — 99024 POSTOP FOLLOW-UP VISIT: CPT | Performed by: SURGERY

## 2018-09-25 RX ORDER — OMEPRAZOLE 20 MG/1
20 CAPSULE, DELAYED RELEASE ORAL DAILY
Qty: 30 CAPSULE | Refills: 0 | Status: SHIPPED | OUTPATIENT
Start: 2018-09-25 | End: 2018-10-02

## 2018-09-25 RX ORDER — CEFAZOLIN SODIUM 2 G/100ML
2 INJECTION, SOLUTION INTRAVENOUS ONCE
Status: CANCELLED | OUTPATIENT
Start: 2018-10-05 | End: 2018-10-05

## 2018-09-25 NOTE — PROGRESS NOTES
Cc: Post-op check hernia surgery    S: Mariana Loya is a 58 y.o. female patient here for follow up of her incarcerated incisional hernia repair on 9/9/18.  The patient is doing well, and has no specific complaints.  Denies problems with incisions, fever, chills, nausea, vomiting, diarrhea or constipation.  The patient was experiencing before surgery is now completely resolved.  She has been able to move her bowels without any problems    O:   Alert and oriented ×3, no acute distress  Chest clear bilaterally  Wound is healing well without signs of infection.  No current signs of recurrence.  Abdomen: soft, non tender.  There is a moderate sized, partially reducible epigastric subxiphoid incisional hernia.  There is slight tenderness to palpation over the area there is no skin color changes    A/P Patient s/p emergent incisional hernia repair with mesh. The patient is doing great and denies any major issues.  The patient has another epigastric subxiphoid hernia from prior cholecystectomy that was not fixed during the initial surgery since this was done emergently for incarceration.  I discussed with the patient about treatment options.  I recommend that she undergoes laparoscopic incisional hernia repair with mesh placement.  Risk and benefits of the procedure including bleeding, infections, mesh complications were discussed in detail with the patient that verbalized understanding and agree with the plan    She was instructed to continue with limited activity for the time being.      At 4 weeks she may resume light aerobic activity, followed by a trial of lifting heavier objects at 6 weeks.      - Plan for laparoscopic incisional hernia repair with mesh placement  - Surgery scheduling started    Kody Diaz MD  General, Minimally Invasive and Endoscopic Surgery  [facility]    [address] [place] [address], [address]  [place], [place], [address]  P: [phone]  F: [phone]

## 2018-10-01 ENCOUNTER — OFFICE VISIT (OUTPATIENT)
Dept: OBSTETRICS AND GYNECOLOGY | Age: 58
End: 2018-10-01

## 2018-10-01 ENCOUNTER — APPOINTMENT (OUTPATIENT)
Dept: CARDIOLOGY | Facility: HOSPITAL | Age: 58
End: 2018-10-01

## 2018-10-01 ENCOUNTER — HOSPITAL ENCOUNTER (EMERGENCY)
Facility: HOSPITAL | Age: 58
Discharge: HOME | End: 2018-10-01
Attending: EMERGENCY MEDICINE | Admitting: EMERGENCY MEDICINE

## 2018-10-01 ENCOUNTER — APPOINTMENT (OUTPATIENT)
Dept: WOMENS IMAGING | Facility: HOSPITAL | Age: 58
End: 2018-10-01

## 2018-10-01 VITALS
BODY MASS INDEX: 29.82 KG/M2 | SYSTOLIC BLOOD PRESSURE: 140 MMHG | DIASTOLIC BLOOD PRESSURE: 76 MMHG | WEIGHT: 190 LBS | HEIGHT: 67 IN

## 2018-10-01 VITALS
HEIGHT: 67 IN | OXYGEN SATURATION: 97 % | RESPIRATION RATE: 18 BRPM | DIASTOLIC BLOOD PRESSURE: 86 MMHG | TEMPERATURE: 98.7 F | HEART RATE: 94 BPM | WEIGHT: 190 LBS | BODY MASS INDEX: 29.82 KG/M2 | SYSTOLIC BLOOD PRESSURE: 166 MMHG

## 2018-10-01 DIAGNOSIS — I82.4Z2 ACUTE DEEP VEIN THROMBOSIS (DVT) OF DISTAL VEIN OF LEFT LOWER EXTREMITY (HCC): Primary | ICD-10-CM

## 2018-10-01 DIAGNOSIS — Z01.419 ENCOUNTER FOR GYNECOLOGICAL EXAMINATION: Primary | ICD-10-CM

## 2018-10-01 DIAGNOSIS — Z72.0 DECLINED SMOKING CESSATION: ICD-10-CM

## 2018-10-01 DIAGNOSIS — K43.2 INCISIONAL HERNIA, WITHOUT OBSTRUCTION OR GANGRENE: ICD-10-CM

## 2018-10-01 DIAGNOSIS — I80.02 THROMBOPHLEBITIS OF SUPERFICIAL VEINS OF LEFT LOWER EXTREMITY: ICD-10-CM

## 2018-10-01 PROBLEM — I80.9 SUPERFICIAL THROMBOPHLEBITIS: Status: ACTIVE | Noted: 2018-10-01

## 2018-10-01 LAB
BH CV LOW VAS LEFT COMMON FEMORAL SPONT: 1
BH CV LOW VAS LEFT GREATER SAPH AK VESSEL: 1
BH CV LOW VAS LEFT GREATER SAPH BK VESSEL: 1
BH CV LOW VAS LEFT POSTERIOR TIBIAL VESSEL: 1
BH CV LOW VAS LEFT SAPHENOFEMORAL JUNCTION SPONT: 1
BH CV LOWER VASCULAR LEFT COMMON FEMORAL COMPRESS: NORMAL
BH CV LOWER VASCULAR LEFT COMMON FEMORAL PHASIC: NORMAL
BH CV LOWER VASCULAR LEFT COMMON FEMORAL SPONT: NORMAL
BH CV LOWER VASCULAR LEFT COMMON FEMORAL THROMBUS: NORMAL
BH CV LOWER VASCULAR LEFT DISTAL FEMORAL COMPRESS: NORMAL
BH CV LOWER VASCULAR LEFT GASTRONEMIUS COMPRESS: NORMAL
BH CV LOWER VASCULAR LEFT GREATER SAPH AK COMPRESS: NORMAL
BH CV LOWER VASCULAR LEFT GREATER SAPH AK THROMBUS: NORMAL
BH CV LOWER VASCULAR LEFT GREATER SAPH BK COMPRESS: NORMAL
BH CV LOWER VASCULAR LEFT GREATER SAPH BK THROMBUS: NORMAL
BH CV LOWER VASCULAR LEFT LESSER SAPH COMPRESS: NORMAL
BH CV LOWER VASCULAR LEFT MID FEMORAL COMPRESS: NORMAL
BH CV LOWER VASCULAR LEFT MID FEMORAL PHASIC: NORMAL
BH CV LOWER VASCULAR LEFT MID FEMORAL SPONT: NORMAL
BH CV LOWER VASCULAR LEFT PERONEAL COMPRESS: NORMAL
BH CV LOWER VASCULAR LEFT POPLITEAL COMPRESS: NORMAL
BH CV LOWER VASCULAR LEFT POPLITEAL PHASIC: NORMAL
BH CV LOWER VASCULAR LEFT POPLITEAL SPONT: NORMAL
BH CV LOWER VASCULAR LEFT POSTERIOR TIBIAL COMPRESS: NORMAL
BH CV LOWER VASCULAR LEFT POSTERIOR TIBIAL THROMBUS: NORMAL
BH CV LOWER VASCULAR LEFT PROXIMAL FEMORAL COMPRESS: NORMAL
BH CV LOWER VASCULAR LEFT SAPHENOFEMORAL JUNCTION COMPRESS: NORMAL
BH CV LOWER VASCULAR LEFT SAPHENOFEMORAL JUNCTION PHASIC: NORMAL
BH CV LOWER VASCULAR LEFT SAPHENOFEMORAL JUNCTION SPONT: NORMAL
BH CV LOWER VASCULAR LEFT SAPHENOFEMORAL JUNCTION THROMBUS: NORMAL
BH CV LOWER VASCULAR RIGHT COMMON FEMORAL AUGMENT: NORMAL
BH CV LOWER VASCULAR RIGHT COMMON FEMORAL COMPETENT: NORMAL
BH CV LOWER VASCULAR RIGHT COMMON FEMORAL COMPRESS: NORMAL
BH CV LOWER VASCULAR RIGHT COMMON FEMORAL PHASIC: NORMAL
BH CV LOWER VASCULAR RIGHT COMMON FEMORAL SPONT: NORMAL

## 2018-10-01 PROCEDURE — 99282 EMERGENCY DEPT VISIT SF MDM: CPT

## 2018-10-01 PROCEDURE — 77067 SCR MAMMO BI INCL CAD: CPT | Performed by: RADIOLOGY

## 2018-10-01 PROCEDURE — 93971 EXTREMITY STUDY: CPT

## 2018-10-01 PROCEDURE — 99396 PREV VISIT EST AGE 40-64: CPT | Performed by: OBSTETRICS & GYNECOLOGY

## 2018-10-01 RX ORDER — ACETAMINOPHEN AND CODEINE PHOSPHATE 300; 30 MG/1; MG/1
TABLET ORAL
Refills: 0 | COMMUNITY
Start: 2018-09-29 | End: 2018-10-01

## 2018-10-01 NOTE — ED TRIAGE NOTES
Pt has hx of blood clot, pt concerned she has another in a different part of leg. Superficial clot in lower part of leg. Pt now has soreness of left upper leg.

## 2018-10-01 NOTE — PROGRESS NOTES
Clinical Pharmacy Services: Anticoagulation Discharge Counseling    Mariana Loya is a 58 y.o. female who was diagnosed in the ER with left leg calf vein DVT.    She is being discharged on apixaban as a new medication. I counseled the patient about this medication, including the following information:  · Indication  · Dose and frequency:  Apixaban 10mg PO BID x 1 week, then apixaban 5mg PO BID  · Administration instructions: May take with or without food  · Drug interactions: Avoid NSAIDs  · Side effects  · Monitoring for signs and symptoms of bleeding    Patient verbalized good understanding of information and were able to repeat sotomayor points back to me. Patient was also provided with a co-pay card to help with cost of medication. Patient provided opportunity to ask questions.    Please call if questions.    Faith Gamble, PharmD, [facility]  Clinical Pharmacy Specialist, Emergency Medicine   Phone: [phone]

## 2018-10-01 NOTE — PROGRESS NOTES
Left lower extremity venous doppler completed, Prelim positive for new DVT, superficial thrombus and calf vein thrombus given to RUTHANN Villalta.

## 2018-10-01 NOTE — ED PROVIDER NOTES
EMERGENCY DEPARTMENT ENCOUNTER    CHIEF COMPLAINT  Chief Complaint: L upper leg pain  History given by: Pt  History limited by: Nothing  Room Number: 37/37  PMD: Nakul Hinds Jr., MD      HPI:  Pt is a 58 y.o. female who presents complaining of mild to moderate L upper leg pain, which the pt states feels like a bruise, and began 3 days ago. The pain is worse with walking. The pt denies chest pain, SOA, or fever.     The pt is scheduled for a hernia surgery by Dr. Diaz in 4 days.     Duration/Onset/Timing: 3 days/gradual/constant  Location: L thigh  Radiation: None  Quality: Like a bruise  Intensity/Severity: Mild to moderate  Associated Symptoms: None  Aggravating or Alleviating Factors: Walking worsens the pain  Previous Episodes: Unknown      PAST MEDICAL HISTORY  Active Ambulatory Problems     Diagnosis Date Noted   • Hyperlipidemia 09/19/2016   • Smoker 09/19/2016   • Incisional hernia, without obstruction or gangrene 05/01/2017   • MRSA exposure 05/01/2017   • Superficial thrombophlebitis 10/01/2018   • Declined smoking cessation 10/01/2018     Resolved Ambulatory Problems     Diagnosis Date Noted   • Incarcerated hernia 09/08/2018     Past Medical History:   Diagnosis Date   • Colon polyps 10/07/2013   • Diverticulosis 10/07/2013   • Hyperlipidemia    • Superficial thrombosis of leg, left        PAST SURGICAL HISTORY  Past Surgical History:   Procedure Laterality Date   • CHOLECYSTECTOMY     • COLONOSCOPY W/ POLYPECTOMY N/A 10/07/2013    One 10 mm polyp in the proximal ascending colon: resected and retrieved, diverticulosis in the sigmoid colon and in the descending colon-Dr. Eddi Anne    • HERNIA REPAIR     • KNEE ARTHROSCOPY W/ MENISCECTOMY Left 11/17/2006    Left knee arthroscopy with complete lateral meniscectomy-Dr. Ming Thorpe   • LAPAROSCOPIC CHOLECYSTECTOMY W/ CHOLANGIOGRAPHY N/A 11/14/2001    Dr. Lucy Levi   • TONSILLECTOMY AND ADENOIDECTOMY N/A    • VENTRAL/INCISIONAL HERNIA REPAIR  N/A 9/8/2018    Procedure: incarcerated /INCISIONAL HERNIA REPAIR with mesh;  Surgeon: Kody Diaz MD;  Location: [facility] OR;  Service: General   • WISDOM TOOTH EXTRACTION N/A 1980    Dr. Wong       FAMILY HISTORY  Family History   Problem Relation Age of Onset   • Diabetes Mother    • Heart disease Mother         chf   • Heart disease Father    • No Known Problems Sister    • No Known Problems Brother    • No Known Problems Daughter    • No Known Problems Son    • No Known Problems Maternal Grandmother    • No Known Problems Paternal Grandmother    • No Known Problems Maternal Aunt    • No Known Problems Paternal Aunt    • BRCA 1/2 Neg Hx    • Breast cancer Neg Hx    • Colon cancer Neg Hx    • Endometrial cancer Neg Hx    • Ovarian cancer Neg Hx        SOCIAL HISTORY  Social History     Social History   • Marital status:      Spouse name: Orlando   • Number of children: N/A   • Years of education: N/A     Occupational History   • Not on file.     Social History Main Topics   • Smoking status: Current Every Day Smoker     Packs/day: 0.50     Years: 44.00   • Smokeless tobacco: Current User   • Alcohol use No   • Drug use: No   • Sexual activity: Defer     Other Topics Concern   • Not on file     Social History Narrative   • No narrative on file       ALLERGIES  Patient has no known allergies.    REVIEW OF SYSTEMS  Review of Systems   Constitutional: Negative.  Negative for fever.   HENT: Negative.  Negative for sore throat.    Eyes: Negative.    Respiratory: Negative.  Negative for cough and shortness of breath.    Cardiovascular: Negative.  Negative for chest pain.   Gastrointestinal: Negative.    Genitourinary: Negative.  Negative for dysuria.   Musculoskeletal: Negative.  Negative for back pain.        L upper leg pain   Skin: Negative.  Negative for rash.   Neurological: Negative.  Negative for headaches.   All other systems reviewed and are negative.      PHYSICAL EXAM  ED Triage  Vitals   Temp Heart Rate Resp BP SpO2   10/01/18 1312 10/01/18 1312 10/01/18 1349 10/01/18 1349 10/01/18 1312   98.7 °F (37.1 °C) 102 16 144/97 98 %      Temp src Heart Rate Source Patient Position BP Location FiO2 (%)   10/01/18 1312 10/01/18 1312 10/01/18 1349 10/01/18 1349 --   Tympanic Monitor Sitting Right arm        Physical Exam   Constitutional: No distress.   HENT:   Head: Normocephalic and atraumatic.   Cardiovascular: Regular rhythm and normal heart sounds.    Pulmonary/Chest: No respiratory distress.   Abdominal: There is no tenderness.   Musculoskeletal: She exhibits no tenderness.   L thigh erythema and warmth to medial distal aspect, area of induration in medial proximal L calf   Skin: No rash noted.   Nursing note and vitals reviewed.        RADIOLOGY  No orders to display   Doppler LLE positive for calf vein DVT    I ordered the above noted radiological studies. Interpreted by radiologist.  Reviewed by me in PACS.       PROCEDURES  Procedures      PROGRESS AND CONSULTS  ED Course as of Oct 01 1650   Mon Oct 01, 2018   1341 Dx'd with SVT in left lower leg 2 weeks ago, now has red, tender swollen area to left thigh.  [MS]      ED Course User Index  [MS] Enedina Messina, APRN   1532 Placed call to surgery.     1554 Dr. Diaz spoke with Mary at desk who states that he will cancel the pt's surgery and she should f/u in office.    1556 Rechecked the patient and she is resting comfortably. Discussed that Dr. Diaz (surgery) wanted to cancel the pt's surgery. Discussed the plan to discharge the pt, and that our pharmacist will speak with her aout blood thinner options. Patient agrees with plan and all questions were addressed.     1646 Faith (pharmacist) spoke with the pt, and decided on Eliquis for a blood thinner.     MEDICAL DECISION MAKING  Results were reviewed/discussed with the patient and they were also made aware of online access. Pt also made aware that some labs, such as cultures, will  not be resulted during ER visit and follow up with PMD is necessary.     MDM  Number of Diagnoses or Management Options     Amount and/or Complexity of Data Reviewed  Tests in the radiology section of CPT®: ordered and reviewed (Doppler LLE positive for calf vein DVT)  Decide to obtain previous medical records or to obtain history from someone other than the patient: yes  Review and summarize past medical records: yes  Discuss the patient with other providers: yes    Patient Progress  Patient progress: improved         DIAGNOSIS  Final diagnoses:   Acute deep vein thrombosis (DVT) of distal vein of left lower extremity (CMS/HCC)       DISPOSITION  Disposition: Discharged.    Patient discharged in stable condition.    Reviewed implications of results, diagnosis, meds, responsibility to follow up, warning signs and symptoms of possible worsening, potential complications and reasons to return to ER, including new or worsening symptoms.    Patient/Family voiced understanding of above instructions.    Discussed plan for discharge, as there is no emergent indication for admission.  Pt/family is agreeable and understands need for follow up and repeat testing.  Pt is aware that discharge does not mean that nothing is wrong but it indicates no emergency is present and they must continue care with follow-up as given below or physician of their choice.     FOLLOW-UP  Nakul Hinds Jr., MD  [address] [address]  [phone]    Schedule an appointment as soon as possible for a visit in 1 week      Vincent Lazaro MD  [address]  [place] [address]  [phone]      Call for Appointment    Kody Diaz MD  [address] [facility] [address]  [place] [address]  [phone]      Contact to reschedule surgery         Medication List      New Prescriptions    * apixaban 5 MG tablet tablet  Commonly known as:  ELIQUIS  Take 2 tablets by mouth 2 (Two) Times a Day. Take 10mg BID for 1  week,   then 5mg BID     * apixaban 5 MG tablet tablet  Commonly known as:  ELIQUIS  Take 1 tablet by mouth 2 (Two) Times a Day.        * This list has 2 medication(s) that are the same as other medications   prescribed for you. Read the directions carefully, and ask your doctor or   other care provider to review them with you.                Latest Documented Vital Signs:  As of 4:50 PM  BP- 144/97 HR- 102 Temp- 98.7 °F (37.1 °C) (Tympanic) O2 sat- 98%    --  Documentation assistance provided by coty Ku for Dr. Sadler.  Information recorded by the scribe was done at my direction and has been verified and validated by me.       Brittney Ku  10/01/18 1533       Brittney Ku  10/01/18 1604       Brittney Ku  10/01/18 1922       Paul Sadler MD  10/01/18 1370

## 2018-10-01 NOTE — PROGRESS NOTES
Subjective   Chief Complaint   Patient presents with   • Gynecologic Exam     Annual:last pap 2017,mammo today @ [facility]      History of Present Illness    Mariana Loya is a very pleasant  58 y.o. female who presents for annual exam.  , Mammo Exam scheduled later today, Contraception post menopausal, Exercise limited  Patient without any gynecological concerns or complaints today  He was recently diagnosed with a left superficial thrombophlebitis. She is scheduled for an umbilical hernia repair later this week. She is due for her colonoscopy and I have urged her to schedule that. She is getting her mammogram later today along with her Pap smear this morning..    Obstetric History:  OB History      Para Term  AB Living        0          SAB TAB Ectopic Molar Multiple Live Births                        Menstrual History:     No LMP recorded. Patient is postmenopausal.       Sexual History:       Past Medical History:   Diagnosis Date   • Colon polyps 10/07/2013    Ascending colon biopsy: fragments of tubular adenoma with low grade dysplasia   • Diverticulosis 10/07/2013    Found in Colonoscopy done by Dr. Eddi Anne   • Hyperlipidemia      Past Surgical History:   Procedure Laterality Date   • COLONOSCOPY W/ POLYPECTOMY N/A 10/07/2013    One 10 mm polyp in the proximal ascending colon: resected and retrieved, diverticulosis in the sigmoid colon and in the descending colon-Dr. Eddi Anne    • KNEE ARTHROSCOPY W/ MENISCECTOMY Left 2006    Left knee arthroscopy with complete lateral meniscectomy-Dr. Ming Thorpe   • LAPAROSCOPIC CHOLECYSTECTOMY W/ CHOLANGIOGRAPHY N/A 2001    Dr. Lucy Levi   • TONSILLECTOMY AND ADENOIDECTOMY N/A    • VENTRAL/INCISIONAL HERNIA REPAIR N/A 2018    Procedure: incarcerated /INCISIONAL HERNIA REPAIR with mesh;  Surgeon: Kody Diaz MD;  Location: [facility];  Service: General   • WISDOM TOOTH EXTRACTION N/A     Dr. Wong  "      SOCIAL Hx:      The following portions of the patient's history were reviewed and updated as appropriate: allergies, current medications, past family history, past medical history, past social history, past surgical history and problem list.    Review of Systems        Except as outlined in history of physical illness, patient denies any changes in her GYN, , GI systems.  All other systems reviewed are negative         Objective   Physical Exam    /76   Ht 170.2 cm (67\")   Wt 86.2 kg (190 lb)   BMI 29.76 kg/m²     General: Patient is alert and oriented and appears overall healthy  Neck: Is supple without thyromegaly, no carotid bruits and no lymphadenopathy  Lungs: Clear bilaterally, no wheezing, rhonchi, or rales.  Respiratory rate is normal  Breast: Even symmetrical, no lymphadenopathy, no retraction, no masses appreciated on either side  Heart: Regular rate and rhythm are appreciated, no murmurs or rubs are heard  Abdomen: Is soft, without organomegaly, bowel sounds are positive, there is no                                rebound or guarding and palpation does not produce any discomfort  Back: Nontender without CVA tenderness  Pelvic: External genitalia appear normal and consistent with mature female.  BUS normal              Urethra appears normal and without mass, bladder is nontender and without any               Masses.               Vagina is clean dry without discharge and appears adequately estrogenized, no               lesions or masses are present                         Cervix is noninflamed without discharge or lesions.  There is no cervical motion             tenderness.                Uterus is nonenlarged, without tenderness, and no masses or abnormalities are  present               Adnexa are non-enlarged, non tender               Rectal exam reveals adequate sphincter tone and no masses or lesions are                     appreciated on digital rectal examination.       Patient " Active Problem List   Diagnosis   • Hyperlipidemia   • Smoker   • Incisional hernia, without obstruction or gangrene   • MRSA exposure   • Superficial thrombophlebitis   • Declined smoking cessation                Assessment/Plan   Mariana was seen today for gynecologic exam.    Diagnoses and all orders for this visit:    Encounter for gynecological examination  -     IGP, Apt HPV,rfx 16 / 18,45    Thrombophlebitis of superficial veins of left lower extremity  Patient was seen and diagnosed in the ER. She is been treating this conservatively. I have recommended that she get that reevaluated prior to her surgery this Friday. She is in agreement with this recommendation and will call her physician later this morning  Incisional hernia, without obstruction or gangrene  Scheduled for surgery  Declined smoking cessation      Patient's due for colonoscopy. We discussed the importance of scheduling that  I advised Mariana of the risks of continuing to use tobacco, and I provided her with tobacco cessation educational materials in the After Visit Summary.     During this visit, I spent 3 minutes counseling the patient regarding tobacco cessation.  Annual Well Woman Exam    Discussed today's findings and concerns with patient in detail.  Continue to recommend regular exercise to include cardiovascular and resistance training and breast self-exam. Wellness lab, mammography, & pap smear, in accordance with age guidelines.  Dietary and caloric recommendations were discussed.        All of the patient's questions were addressed and answered, I have encouraged her to call for today's test results if she has not received them within 10 days.  Patient is advised to call with any change in her condition or with any other questions, otherwise return in 12 months for annual examination.

## 2018-10-02 ENCOUNTER — OFFICE VISIT (OUTPATIENT)
Dept: FAMILY MEDICINE CLINIC | Facility: CLINIC | Age: 58
End: 2018-10-02

## 2018-10-02 VITALS
BODY MASS INDEX: 29.57 KG/M2 | HEIGHT: 67 IN | WEIGHT: 188.4 LBS | TEMPERATURE: 99 F | DIASTOLIC BLOOD PRESSURE: 90 MMHG | HEART RATE: 96 BPM | OXYGEN SATURATION: 97 % | SYSTOLIC BLOOD PRESSURE: 140 MMHG

## 2018-10-02 DIAGNOSIS — I82.412 ACUTE DEEP VEIN THROMBOSIS (DVT) OF FEMORAL VEIN OF LEFT LOWER EXTREMITY (HCC): Primary | ICD-10-CM

## 2018-10-02 PROCEDURE — 99213 OFFICE O/P EST LOW 20 MIN: CPT | Performed by: INTERNAL MEDICINE

## 2018-10-02 NOTE — PROGRESS NOTES
Subjective   Mariana Loya is a 58 y.o. female.   Recent occurrence of DVT in the postoperative setting was seen on 9/24/18 without superficial phlebitis  History of Present Illness   As above after 924 was seen again on 10/1/18 and now DVT of the medial thigh  Causes pain is on Eliquis for this.  He has to walk a lot at work having a lot of pain medial side as I think she can do that at this point we will have her continue her eloquis as prescribed did discuss the cost and coupons to help with that she will have a work note extended through the October 15 plan return to work on 16th and she will follow-up her list of if her leg symptoms do not improve.  She did get seen in the ER at [facility], operated by [facility] for this and 10/1/18.  Current outpatient and discharge medications have been reconciled for the patient.  Reviewed by: Nakul Hinds Jr., MD      Review of Systems   All other systems reviewed and are negative.      Objective   Vitals:    10/02/18 1358   BP: 140/90   Pulse: 96   Temp: 99 °F (37.2 °C)   SpO2: 97%   Weight: 85.5 kg (188 lb 6.4 oz)     Physical Exam   Constitutional: She appears well-developed and well-nourished.   HENT:   Head: Normocephalic and atraumatic.   Cardiovascular: Normal rate, regular rhythm and normal heart sounds.    Pulmonary/Chest: Effort normal and breath sounds normal.   Musculoskeletal:   Thyroid was easily palpable thrombosed vein antra medial thigh.  The surprising degree of redness and increased heat or flank area advised moist heat patient is continue her eloquis for this did have a DVT of common femoral vein this diagnosed by ultrasound in [facility], operated by [facility] ER.   Skin: Skin is warm and dry.       No results found for: INR    Procedures    Assessment/Plan   Acute DVT left femoral vein plan tentatively will be on Eliquis from 3-6 months.  Patient is currently scheduled to see vascular surgeon I believe Dr. Vincent Lazaro sometime next week or 2  No work throough 10/15/18 relative rest moist heat to  the leg.  Follow-up or call symptoms worsen.    Much of this encounter note is an electronic transcription/translation of spoken language to printed text.  The electronic translation of spoken language may permit erroneous, or at times, nonsensical words or phrases to be inadvertently transcribed.  Although I have reviewed the note for such errors, some may still exist. If there are questions or for further clarification, please contact me.

## 2018-10-03 ENCOUNTER — APPOINTMENT (OUTPATIENT)
Dept: PREADMISSION TESTING | Facility: HOSPITAL | Age: 58
End: 2018-10-03

## 2018-10-03 ENCOUNTER — TELEPHONE (OUTPATIENT)
Dept: FAMILY MEDICINE CLINIC | Facility: CLINIC | Age: 58
End: 2018-10-03

## 2018-10-04 LAB
CYTOLOGIST CVX/VAG CYTO: ABNORMAL
CYTOLOGY CVX/VAG DOC THIN PREP: ABNORMAL
DX ICD CODE: ABNORMAL
DX ICD CODE: ABNORMAL
HIV 1 & 2 AB SER-IMP: ABNORMAL
HPV I/H RISK 4 DNA CVX QL PROBE+SIG AMP: NEGATIVE
OTHER STN SPEC: ABNORMAL
PATH REPORT.FINAL DX SPEC: ABNORMAL
PATHOLOGIST CVX/VAG CYTO: ABNORMAL
RECOM F/U CVX/VAG CYTO: ABNORMAL
STAT OF ADQ CVX/VAG CYTO-IMP: ABNORMAL

## 2018-10-05 ENCOUNTER — TELEPHONE (OUTPATIENT)
Dept: SURGERY | Facility: CLINIC | Age: 58
End: 2018-10-05

## 2018-10-05 ENCOUNTER — TELEPHONE (OUTPATIENT)
Dept: OBSTETRICS AND GYNECOLOGY | Age: 58
End: 2018-10-05

## 2018-10-05 NOTE — TELEPHONE ENCOUNTER
----- Message from Corona Nash MD sent at 10/5/2018 10:56 AM EDT -----  Please notify pt. That labs are with in normal limits Pap shows negative HPV, follow-up with next year's annual

## 2018-10-05 NOTE — PROGRESS NOTES
Please notify pt. That labs are with in normal limits Pap shows negative HPV, follow-up with next year's annual

## 2018-10-05 NOTE — TELEPHONE ENCOUNTER
Spoke with patient about labs.She wanted  to know that she went to ER and was diagnosed with a DVT.Her surgery was cancelled and  is following up with her.

## 2018-10-05 NOTE — TELEPHONE ENCOUNTER
I spoke with Dr. Diaz.  He said patient may lie on side, back and abdomen.  Adjustments would be at the chiropractor's discretion.   When I called patient back, she said the the chiropractor would not adjust her until 6 months post-op.

## 2018-10-22 ENCOUNTER — TELEPHONE (OUTPATIENT)
Dept: FAMILY MEDICINE CLINIC | Facility: CLINIC | Age: 58
End: 2018-10-22

## 2018-10-22 NOTE — ED NOTES
EMERGENCY DEPARTMENT ENCOUNTER    Room Number:  HALE/E  Date seen:  10/22/2018  Time seen: 6:18 PM  PCP: Nakul Hinds Jr., MD    HPI:  Chief complaint: left leg pain  Context:Mariana Loya is a 58 y.o. female who presents to the ED with c/o left leg pain in her calf that has been present for one day.     Timing:constant  Duration: 1 day  Location:left lower leg  Radiation:no  Quality:soreness  Intensity/Severity: moderate  Associated Symptoms:no  Aggravating Factors:no  Alleviating Factors:no  Previous Episodes:no  Treatment before arrival:no    MEDICAL RECORD REVIEW      ALLERGIES  Patient has no known allergies.    PAST MEDICAL HISTORY  Active Ambulatory Problems     Diagnosis Date Noted   • Hyperlipidemia 09/19/2016   • Smoker 09/19/2016   • Incisional hernia, without obstruction or gangrene 05/01/2017   • MRSA exposure 05/01/2017   • Superficial thrombophlebitis 10/01/2018   • Declined smoking cessation 10/01/2018     Resolved Ambulatory Problems     Diagnosis Date Noted   • Incarcerated hernia 09/08/2018     Past Medical History:   Diagnosis Date   • Colon polyps 10/07/2013   • Diverticulosis 10/07/2013   • DVT (deep venous thrombosis) (CMS/HCC)    • Hyperlipidemia    • Superficial thrombosis of leg, left        PAST SURGICAL HISTORY  Past Surgical History:   Procedure Laterality Date   • CHOLECYSTECTOMY     • COLONOSCOPY      Dr. Anne   • COLONOSCOPY W/ POLYPECTOMY N/A 10/07/2013    One 10 mm polyp in the proximal ascending colon: resected and retrieved, diverticulosis in the sigmoid colon and in the descending colon-Dr. Eddi Anne    • HERNIA REPAIR     • KNEE ARTHROSCOPY W/ MENISCECTOMY Left 11/17/2006    Left knee arthroscopy with complete lateral meniscectomy-Dr. Ming Thorpe   • LAPAROSCOPIC CHOLECYSTECTOMY W/ CHOLANGIOGRAPHY N/A 11/14/2001    Dr. Lucy Levi   • TONSILLECTOMY AND ADENOIDECTOMY N/A    • VENTRAL/INCISIONAL HERNIA REPAIR N/A 9/8/2018    Procedure: incarcerated /INCISIONAL  HERNIA REPAIR with mesh;  Surgeon: Kody Diaz MD;  Location: [facility] OR;  Service: General   • WISDOM TOOTH EXTRACTION N/A 1980    Dr. Wong       FAMILY HISTORY  Family History   Problem Relation Age of Onset   • Diabetes Mother    • Heart disease Mother         chf   • Heart disease Father    • No Known Problems Sister    • No Known Problems Brother    • No Known Problems Daughter    • No Known Problems Son    • No Known Problems Maternal Grandmother    • No Known Problems Paternal Grandmother    • No Known Problems Maternal Aunt    • No Known Problems Paternal Aunt    • BRCA 1/2 Neg Hx    • Breast cancer Neg Hx    • Colon cancer Neg Hx    • Endometrial cancer Neg Hx    • Ovarian cancer Neg Hx        SOCIAL HISTORY  Social History     Social History   • Marital status:      Spouse name: Orlando   • Number of children: N/A   • Years of education: N/A     Occupational History   • Not on file.     Social History Main Topics   • Smoking status: Current Every Day Smoker     Packs/day: 0.50     Years: 44.00   • Smokeless tobacco: Current User   • Alcohol use No   • Drug use: No   • Sexual activity: Defer     Other Topics Concern   • Not on file     Social History Narrative   • No narrative on file       REVIEW OF SYSTEMS  Review of Systems   Constitutional: Negative for activity change, appetite change, diaphoresis and fever.   HENT: Negative for trouble swallowing.    Eyes: Negative for visual disturbance.   Respiratory: Negative for cough, chest tightness, shortness of breath and wheezing.    Cardiovascular: Negative for chest pain, palpitations and leg swelling.   Gastrointestinal: Negative for abdominal pain, diarrhea, nausea and vomiting.   Genitourinary: Negative for dysuria.   Musculoskeletal: Positive for arthralgias (left lower leg pain). Negative for back pain and gait problem.   Skin: Negative for rash and wound.   Neurological: Negative for dizziness, speech difficulty and  light-headedness.       PHYSICAL EXAM  ED Triage Vitals   Temp Heart Rate Resp BP SpO2   18 2033 18   99.6 °F (37.6 °C) 110 19 137/94 96 %      Temp src Heart Rate Source Patient Position BP Location FiO2 (%)   -- -- -- -- --            Physical Exam   Constitutional: She is oriented to person, place, and time and well-developed, well-nourished, and in no distress. She does not have a sickly appearance. No distress.   HENT:   Head: Normocephalic and atraumatic.   Mouth/Throat: Uvula is midline and mucous membranes are normal.   Neck: Normal range of motion. Neck supple.   Cardiovascular: Normal rate, regular rhythm, S1 normal, S2 normal and normal heart sounds.  Exam reveals no gallop and no friction rub.    No murmur heard.  Pulmonary/Chest: Effort normal and breath sounds normal. She has no decreased breath sounds. She has no wheezes. She has no rhonchi. She has no rales.   Abdominal: Soft. Normal appearance. There is no rebound and no guarding.   Musculoskeletal: Normal range of motion.        Left lower leg: She exhibits tenderness. She exhibits no bony tenderness and no swelling.        Legs:  Likely varicosity in this area.     Neurological: She is alert and oriented to person, place, and time. Gait normal. GCS score is 15.   Skin: Skin is warm, dry and intact.   Psychiatric: Affect and judgment normal.   Nursing note and vitals reviewed.      LAB RESULTS  No results found for this or any previous visit (from the past 24 hour(s)).    I ordered the above labs and reviewed the results    RADIOLOGY  No orders to display     Mariana Loya   Duplex scan of extremity veins including responses to compression and other maneuvers; unilateral   Order# [id number]   Reading physician: Tejas Guadalupe MD Ordering physician: Fatou Baez APRN Study date: 18   Patient Information   Patient Name  Mariana Loya MRN  [id number] Sex  Female   "(Age)  1960 (58 y.o.)   Clinical Indication   edema/swelling; localized swelling, mass and lump; superficial thrombophlebitis; acute; left; calf   Interpretation Summary   · There was superficial venous valvular incompetence noted in the left saphenofemoral junction.  · Acute left lower extremity superficial thrombophlebitis noted in the greater saphenous (below knee) and varicosity (below knee).  · All other left sided veins appeared normal.      Patient Hx Of Height, Weight, and Vitals   Height Weight BSA (Calculated - sq m) BMI (kg/m2) Pulse BP   170.2 cm (67\")    110    Study Impression   • Right Common Femoral: No deep vein thrombosis noted.   • Left Common Femoral: No deep vein thrombosis noted.   • Left Saphenofemoral Junction: No superficial thrombophlebitis noted. There was evidence of valvular incompetence noted.   • Left Proximal Femoral: No deep vein thrombosis noted.   • Left Mid Femoral: No deep vein thrombosis noted.   • Left Distal Femoral: No deep vein thrombosis noted.   • Left Popliteal: No deep vein thrombosis noted.   • Left Posterior Tibial: No deep vein thrombosis noted.   • Left Peroneal: No deep vein thrombosis noted.   • Left Gastrocnemius Soleal: No deep vein thrombosis noted.   • Left Greater Saphenous Above Knee: No superficial thrombophlebitis noted.   • Left Greater Saphenous Below Knee: Acute superficial thrombophlebitis noted.   • Left Varicosity Below Knee: Acute superficial thrombophlebitis noted.         MEDICATIONS GIVEN IN ER  Medications - No data to display    EKG  Interpreted by ED Physician    PROCEDURES  Procedures    COURSE & MEDICAL DECISION MAKING  Pertinent Labs and Imaging studies that were ordered and reviewed are noted above.  Results were reviewed/discussed with the patient and they were also made aware of online access.  Pt also made aware that some labs, such as cultures, will not be resulted during ER visit and follow up with PMD is necessary.     PROGRESS " "AND CONSULTS    Progress Notes:    ED Course as of Oct 22 1818   Mon Sep 24, 2018   2040 1 day history of localized painful, reddened projection on LLE  [EP]      ED Course User Index  [EP] Fatou Baez, APRN       2155:Reviewed pt's history and workup with Dr. Aguilar.  After a bedside evaluation, Dr. Aguilar agrees with the plan of care.    2210: The patient's history, physical exam, and lab findings were discussed with the physician, who also performed a face to face history and physical exam.  I discussed all results and noted any abnormalities with patient.  Discussed absoute need to recheck abnormalities with their family physician.  I answered any of the patient's questions.  Discussed plan for discharge, as there is no emergent indication for admission.  Pt is agreeable and understands need for follow up and repeat testing.  Pt is aware that discharge does not mean that nothing is wrong but it indicates no emergency is present and they must continue care with their family physician.  Pt is discharged with instructions to follow up with primary care doctor to have their blood pressure rechecked.       Disposition vitals:  /94   Pulse 91   Temp 99.6 °F (37.6 °C)   Resp 19   Ht 170.2 cm (67\")   Wt 83.9 kg (185 lb)   SpO2 98%   BMI 28.98 kg/m²       DIAGNOSIS  Final diagnoses:   Superficial thrombophlebitis of left leg       FOLLOW UP   Nakul Hinds Jr., MD  [address] [id number]  [phone]    Schedule an appointment as soon as possible for a visit in 1 week        RX     Medication List      No changes were made to your prescriptions during this visit.                Fatou Baez, APRN  10/22/18 1822    "

## 2018-10-22 NOTE — TELEPHONE ENCOUNTER
Calling for samples of eloquis 5 mg, this is just an FYI and so you can chart it, dr moreno ok-ed me giving her the samples, so this is already been done, just needs to be documented.

## 2018-10-29 ENCOUNTER — CLINICAL SUPPORT (OUTPATIENT)
Dept: FAMILY MEDICINE CLINIC | Facility: CLINIC | Age: 58
End: 2018-10-29

## 2018-10-29 PROCEDURE — 90632 HEPA VACCINE ADULT IM: CPT | Performed by: INTERNAL MEDICINE

## 2018-10-29 PROCEDURE — 90471 IMMUNIZATION ADMIN: CPT | Performed by: INTERNAL MEDICINE

## 2018-12-12 ENCOUNTER — TELEPHONE (OUTPATIENT)
Dept: FAMILY MEDICINE CLINIC | Facility: CLINIC | Age: 58
End: 2018-12-12

## 2019-02-26 ENCOUNTER — TELEPHONE (OUTPATIENT)
Dept: GASTROENTEROLOGY | Facility: CLINIC | Age: 59
End: 2019-02-26

## 2019-02-26 NOTE — TELEPHONE ENCOUNTER
Patient called to reschedule colonoscopy. Updated Open Access health history paper work mailed. Colonoscopy to be done 4-15-19. She will arrive at 7.30 am.

## 2019-03-07 ENCOUNTER — PREP FOR SURGERY (OUTPATIENT)
Dept: OTHER | Facility: HOSPITAL | Age: 59
End: 2019-03-07

## 2019-03-07 DIAGNOSIS — Z86.010 HISTORY OF COLON POLYPS: Primary | ICD-10-CM

## 2019-03-07 RX ORDER — SODIUM CHLORIDE, SODIUM LACTATE, POTASSIUM CHLORIDE, CALCIUM CHLORIDE 600; 310; 30; 20 MG/100ML; MG/100ML; MG/100ML; MG/100ML
30 INJECTION, SOLUTION INTRAVENOUS CONTINUOUS
Status: CANCELLED | OUTPATIENT
Start: 2019-04-15

## 2019-03-08 PROBLEM — Z86.0100 HISTORY OF COLON POLYPS: Status: ACTIVE | Noted: 2019-03-08

## 2019-03-08 PROBLEM — Z86.010 HISTORY OF COLON POLYPS: Status: ACTIVE | Noted: 2019-03-08

## 2019-04-09 ENCOUNTER — OFFICE VISIT (OUTPATIENT)
Dept: FAMILY MEDICINE CLINIC | Facility: CLINIC | Age: 59
End: 2019-04-09

## 2019-04-09 VITALS
DIASTOLIC BLOOD PRESSURE: 82 MMHG | TEMPERATURE: 98.5 F | SYSTOLIC BLOOD PRESSURE: 102 MMHG | OXYGEN SATURATION: 98 % | HEIGHT: 67 IN | HEART RATE: 101 BPM | BODY MASS INDEX: 31.42 KG/M2 | WEIGHT: 200.2 LBS

## 2019-04-09 DIAGNOSIS — Z83.49 FH: THYROID DISEASE: ICD-10-CM

## 2019-04-09 DIAGNOSIS — Z00.00 ENCOUNTER FOR ANNUAL GENERAL MEDICAL EXAMINATION WITHOUT ABNORMAL FINDINGS IN ADULT: Primary | ICD-10-CM

## 2019-04-09 DIAGNOSIS — R82.90 BAD ODOR OF URINE: ICD-10-CM

## 2019-04-09 DIAGNOSIS — E78.49 OTHER HYPERLIPIDEMIA: ICD-10-CM

## 2019-04-09 DIAGNOSIS — Z72.0 TOBACCO ABUSE: ICD-10-CM

## 2019-04-09 LAB
ALBUMIN SERPL-MCNC: 4.2 G/DL (ref 3.5–5.2)
ALBUMIN/GLOB SERPL: 1.6 G/DL
ALP SERPL-CCNC: 65 U/L (ref 39–117)
ALT SERPL W P-5'-P-CCNC: 26 U/L (ref 1–33)
ANION GAP SERPL CALCULATED.3IONS-SCNC: 12.8 MMOL/L
AST SERPL-CCNC: 15 U/L (ref 1–32)
BACTERIA UR QL AUTO: ABNORMAL /HPF
BILIRUB SERPL-MCNC: 0.2 MG/DL (ref 0.2–1.2)
BILIRUB UR QL STRIP: NEGATIVE
BUN BLD-MCNC: 17 MG/DL (ref 6–20)
BUN/CREAT SERPL: 32.1 (ref 7–25)
CALCIUM SPEC-SCNC: 9.1 MG/DL (ref 8.6–10.5)
CHLORIDE SERPL-SCNC: 106 MMOL/L (ref 98–107)
CHOLEST SERPL-MCNC: 262 MG/DL (ref 0–200)
CLARITY UR: CLEAR
CO2 SERPL-SCNC: 21.2 MMOL/L (ref 22–29)
COLOR UR: YELLOW
CREAT BLD-MCNC: 0.53 MG/DL (ref 0.57–1)
ERYTHROCYTE [DISTWIDTH] IN BLOOD BY AUTOMATED COUNT: 13.7 % (ref 12.3–15.4)
GFR SERPL CREATININE-BSD FRML MDRD: 118 ML/MIN/1.73
GLOBULIN UR ELPH-MCNC: 2.7 GM/DL
GLUCOSE BLD-MCNC: 120 MG/DL (ref 65–99)
GLUCOSE UR STRIP-MCNC: NEGATIVE MG/DL
HCT VFR BLD AUTO: 45.3 % (ref 34–46.6)
HDLC SERPL-MCNC: 37 MG/DL (ref 40–60)
HGB BLD-MCNC: 14.8 G/DL (ref 12–15.9)
HGB UR QL STRIP.AUTO: ABNORMAL
KETONES UR QL STRIP: NEGATIVE
LDLC SERPL CALC-MCNC: 188 MG/DL (ref 0–100)
LDLC/HDLC SERPL: 5.08 {RATIO}
LEUKOCYTE ESTERASE UR QL STRIP.AUTO: NEGATIVE
LYMPHOCYTES # BLD AUTO: 2.4 10*3/MM3 (ref 0.7–3.1)
LYMPHOCYTES NFR BLD AUTO: 30.2 % (ref 19.6–45.3)
MCH RBC QN AUTO: 30.2 PG (ref 26.6–33)
MCHC RBC AUTO-ENTMCNC: 32.7 G/DL (ref 31.5–35.7)
MCV RBC AUTO: 92.1 FL (ref 79–97)
MONOCYTES # BLD AUTO: 0.7 10*3/MM3 (ref 0.1–0.9)
MONOCYTES NFR BLD AUTO: 8.9 % (ref 5–12)
NEUTROPHILS # BLD AUTO: 4.8 10*3/MM3 (ref 1.4–7)
NEUTROPHILS NFR BLD AUTO: 60.9 % (ref 42.7–76)
NITRITE UR QL STRIP: NEGATIVE
PH UR STRIP.AUTO: <=5 [PH] (ref 4.6–8)
PLATELET # BLD AUTO: 214 10*3/MM3 (ref 140–450)
PMV BLD AUTO: 8.7 FL (ref 6–12)
POTASSIUM BLD-SCNC: 4.4 MMOL/L (ref 3.5–5.2)
PROT SERPL-MCNC: 6.9 G/DL (ref 6–8.5)
PROT UR QL STRIP: NEGATIVE
RBC # BLD AUTO: 4.92 10*6/MM3 (ref 3.77–5.28)
RBC # UR: ABNORMAL /HPF
REF LAB TEST METHOD: ABNORMAL
SODIUM BLD-SCNC: 140 MMOL/L (ref 136–145)
SP GR UR STRIP: >=1.03 (ref 1–1.03)
SQUAMOUS #/AREA URNS HPF: ABNORMAL /HPF
TRIGL SERPL-MCNC: 185 MG/DL (ref 0–150)
TSH SERPL DL<=0.05 MIU/L-ACNC: 2 MIU/ML (ref 0.27–4.2)
UROBILINOGEN UR QL STRIP: ABNORMAL
VLDLC SERPL-MCNC: 37 MG/DL (ref 5–40)
WBC NRBC COR # BLD: 7.8 10*3/MM3 (ref 3.4–10.8)
WBC UR QL AUTO: ABNORMAL /HPF

## 2019-04-09 PROCEDURE — 80061 LIPID PANEL: CPT | Performed by: INTERNAL MEDICINE

## 2019-04-09 PROCEDURE — 80053 COMPREHEN METABOLIC PANEL: CPT | Performed by: INTERNAL MEDICINE

## 2019-04-09 PROCEDURE — 81001 URINALYSIS AUTO W/SCOPE: CPT | Performed by: INTERNAL MEDICINE

## 2019-04-09 PROCEDURE — 99396 PREV VISIT EST AGE 40-64: CPT | Performed by: INTERNAL MEDICINE

## 2019-04-09 PROCEDURE — 85025 COMPLETE CBC W/AUTO DIFF WBC: CPT | Performed by: INTERNAL MEDICINE

## 2019-04-09 PROCEDURE — 84443 ASSAY THYROID STIM HORMONE: CPT | Performed by: INTERNAL MEDICINE

## 2019-04-09 PROCEDURE — 99213 OFFICE O/P EST LOW 20 MIN: CPT | Performed by: INTERNAL MEDICINE

## 2019-04-09 NOTE — PROGRESS NOTES
Subjective   Mariana Loya is a 58 y.o. female.   Now for adult annual wellness visit patient is here to get updated testing done.  She has colonoscopy scheduled for next week  History of Present Illness   As above annual wellness visit no major change in his health is a history of stopping smoking we discussed the usual she is interested in Chantix and will prescribe that as well.  She has a colonoscopy are scheduled for next week dissipate and lose her insurance at the end of this month.  Patient's has her female wellness schedule elsewhere as well.  Smokes currently 1 pack/day let patient try Chantix again see if she is more successful here.  Feeling minimally tired family history thyroid trouble needs lipids rechecked.  Has declined statins in the past although has elevated lipids we will recheck patient somewhat more agreeable as well.  Also notes odor to the urine.  No other significant complaints at present no orthopedic type problems no shortness of breath chest pain or GI symptoms voiced.  Currently smoking now down to half pack per day.  Drug allergies are none.  Family is positive for diabetes heart disease  Review of Systems   All other systems reviewed and are negative.      Objective   Vitals:    04/09/19 0830   BP: 102/82   Pulse: 101   Temp: 98.5 °F (36.9 °C)   SpO2: 98%   Weight: 90.8 kg (200 lb 3.2 oz)     Physical Exam   Constitutional: She appears well-developed and well-nourished.   HENT:   Head: Normocephalic and atraumatic.   Eyes: Conjunctivae are normal. Pupils are equal, round, and reactive to light.   Neck: No thyromegaly present.   No carotid bruits   Cardiovascular: Normal rate, regular rhythm and normal heart sounds.   Left and right carotid, radial, femoral, dorsalis pedis pulses are all 2+   Pulmonary/Chest: Effort normal and breath sounds normal.   Abdominal: Soft. Bowel sounds are normal.   Musculoskeletal:   No increased cervical, supra/infraclavicular, axillary inguinal lymph  nodes noted.   Neurological: She is alert.   Unremarkable gait and station   Skin: Skin is warm and dry.   Nursing note and vitals reviewed.      No results found for: INR    Procedures    Assessment/Plan   1.  Encounter for adult annual wellness visit    2.  Tobacco abuse plan Chantix trial starter pack prescribed.  Follow-up was a month see how she is doing    3.  Odor to urine await pending UA    4.  Family history of thyroid disease await TSH    5.  Hyperlipidemia historically has declined statins get updated values with consideration for same.    Much of this encounter note is an electronic transcription/translation of spoken language to printed text.  The electronic translation of spoken language may permit erroneous, or at times, nonsensical words or phrases to be inadvertently transcribed.  Although I have reviewed the note for such errors, some may still exist. If there are questions or for further clarification, please contact me.

## 2019-04-12 ENCOUNTER — TELEPHONE (OUTPATIENT)
Dept: GASTROENTEROLOGY | Facility: CLINIC | Age: 59
End: 2019-04-12

## 2019-04-15 ENCOUNTER — ANESTHESIA (OUTPATIENT)
Dept: GASTROENTEROLOGY | Facility: HOSPITAL | Age: 59
End: 2019-04-15

## 2019-04-15 ENCOUNTER — ANESTHESIA EVENT (OUTPATIENT)
Dept: GASTROENTEROLOGY | Facility: HOSPITAL | Age: 59
End: 2019-04-15

## 2019-04-15 ENCOUNTER — HOSPITAL ENCOUNTER (OUTPATIENT)
Facility: HOSPITAL | Age: 59
Setting detail: HOSPITAL OUTPATIENT SURGERY
Discharge: HOME | End: 2019-04-15
Attending: INTERNAL MEDICINE | Admitting: INTERNAL MEDICINE

## 2019-04-15 VITALS
TEMPERATURE: 98.2 F | BODY MASS INDEX: 30.74 KG/M2 | DIASTOLIC BLOOD PRESSURE: 90 MMHG | WEIGHT: 196.25 LBS | HEART RATE: 79 BPM | SYSTOLIC BLOOD PRESSURE: 120 MMHG | RESPIRATION RATE: 20 BRPM | OXYGEN SATURATION: 99 %

## 2019-04-15 DIAGNOSIS — Z86.010 HISTORY OF COLON POLYPS: ICD-10-CM

## 2019-04-15 PROCEDURE — 25010000002 PROPOFOL 10 MG/ML EMULSION: Performed by: ANESTHESIOLOGY

## 2019-04-15 PROCEDURE — 45385 COLONOSCOPY W/LESION REMOVAL: CPT | Performed by: INTERNAL MEDICINE

## 2019-04-15 PROCEDURE — 45381 COLONOSCOPY SUBMUCOUS NJX: CPT | Performed by: INTERNAL MEDICINE

## 2019-04-15 PROCEDURE — 88305 TISSUE EXAM BY PATHOLOGIST: CPT | Performed by: INTERNAL MEDICINE

## 2019-04-15 RX ORDER — LIDOCAINE HYDROCHLORIDE 20 MG/ML
INJECTION, SOLUTION INFILTRATION; PERINEURAL AS NEEDED
Status: DISCONTINUED | OUTPATIENT
Start: 2019-04-15 | End: 2019-04-15 | Stop reason: SURG

## 2019-04-15 RX ORDER — PROPOFOL 10 MG/ML
VIAL (ML) INTRAVENOUS AS NEEDED
Status: DISCONTINUED | OUTPATIENT
Start: 2019-04-15 | End: 2019-04-15 | Stop reason: SURG

## 2019-04-15 RX ORDER — SODIUM CHLORIDE, SODIUM LACTATE, POTASSIUM CHLORIDE, CALCIUM CHLORIDE 600; 310; 30; 20 MG/100ML; MG/100ML; MG/100ML; MG/100ML
30 INJECTION, SOLUTION INTRAVENOUS CONTINUOUS
Status: DISCONTINUED | OUTPATIENT
Start: 2019-04-15 | End: 2019-04-15 | Stop reason: HOSPADM

## 2019-04-15 RX ORDER — PROPOFOL 10 MG/ML
VIAL (ML) INTRAVENOUS CONTINUOUS PRN
Status: DISCONTINUED | OUTPATIENT
Start: 2019-04-15 | End: 2019-04-15 | Stop reason: SURG

## 2019-04-15 RX ADMIN — PROPOFOL 140 MCG/KG/MIN: 10 INJECTION, EMULSION INTRAVENOUS at 09:36

## 2019-04-15 RX ADMIN — SODIUM CHLORIDE, POTASSIUM CHLORIDE, SODIUM LACTATE AND CALCIUM CHLORIDE 30 ML/HR: 600; 310; 30; 20 INJECTION, SOLUTION INTRAVENOUS at 08:41

## 2019-04-15 RX ADMIN — PROPOFOL 50 MG: 10 INJECTION, EMULSION INTRAVENOUS at 09:36

## 2019-04-15 RX ADMIN — SODIUM CHLORIDE, POTASSIUM CHLORIDE, SODIUM LACTATE AND CALCIUM CHLORIDE: 600; 310; 30; 20 INJECTION, SOLUTION INTRAVENOUS at 09:30

## 2019-04-15 RX ADMIN — LIDOCAINE HYDROCHLORIDE 4 MG: 20 INJECTION, SOLUTION INFILTRATION; PERINEURAL at 09:36

## 2019-04-15 NOTE — ANESTHESIA POSTPROCEDURE EVALUATION
Patient: Mariana Loya    Procedure Summary     Date:  04/15/19 Room / Location:  [facility] ENDOSCOPY 1 /  [facility]    Anesthesia Start:  0930 Anesthesia Stop:  1009    Procedure:  COLONOSCOPY into cecum and TI with hot snare polypectomy with 10  cc saline lift (N/A ) Diagnosis:       History of colon polyps      (History of colon polyps [Z86.010])    Surgeon:  Eddi Anne MD Provider:  Isaias Garcia MD    Anesthesia Type:  MAC ASA Status:  3          Anesthesia Type: MAC  Last vitals  BP   177/82 (04/15/19 0834)   Temp   36.8 °C (98.2 °F) (04/15/19 0834)   Pulse   98 (04/15/19 0834)   Resp   14 (04/15/19 0834)     SpO2   98 % (04/15/19 0834)     Post Anesthesia Care and Evaluation    Patient location during evaluation: bedside  Patient participation: complete - patient participated  Level of consciousness: awake  Pain score: 2  Pain management: adequate  Airway patency: patent  Anesthetic complications: No anesthetic complications  PONV Status: none  Cardiovascular status: acceptable  Respiratory status: acceptable  Hydration status: acceptable    Comments: /82 (BP Location: Left arm, Patient Position: Lying)   Pulse 98   Temp 36.8 °C (98.2 °F) (Oral)   Resp 14   Wt 89 kg (196 lb 4 oz)   SpO2 98%   BMI 30.74 kg/m²

## 2019-04-15 NOTE — DISCHARGE INSTRUCTIONS
For the next 24 hours patient needs to be with a responsible adult.    For 24 hours DO NOT drive, operate machinery, appliances, drink alcohol, make important decisions or sign legal documents.    Start with a light or bland diet and advance to regular diet as tolerated.    Follow recommendations on procedure report provided by your doctor.    Call Dr Anne for problems [phone]. Call for pathology results in 1-2 weeks.    Problems may include but not limited to: large amounts of bleeding, trouble breathing, repeated vomiting, severe unrelieved pain, fever or chills.

## 2019-04-15 NOTE — H&P
CC: Here for endoscopic evaluation.     HPI: History of colon polyps.       Past Medical History:   Diagnosis Date   • Colon polyps 10/07/2013    Ascending colon biopsy: fragments of tubular adenoma with low grade dysplasia   • Diverticulosis 10/07/2013    Found in Colonoscopy done by Dr. Eddi Anne   • DVT (deep venous thrombosis) (CMS/HCC)    • Hyperlipidemia    • Superficial thrombosis of leg, left        Past Surgical History:   Procedure Laterality Date   • CHOLECYSTECTOMY     • COLONOSCOPY      Dr. Anne   • COLONOSCOPY W/ POLYPECTOMY N/A 10/07/2013    One 10 mm polyp in the proximal ascending colon: resected and retrieved, diverticulosis in the sigmoid colon and in the descending colon-Dr. Eddi Anne    • HERNIA REPAIR     • KNEE ARTHROSCOPY W/ MENISCECTOMY Left 11/17/2006    Left knee arthroscopy with complete lateral meniscectomy-Dr. Ming Thorpe   • LAPAROSCOPIC CHOLECYSTECTOMY W/ CHOLANGIOGRAPHY N/A 11/14/2001    Dr. Lucy Levi   • TONSILLECTOMY AND ADENOIDECTOMY N/A    • VENTRAL/INCISIONAL HERNIA REPAIR N/A 9/8/2018    Procedure: incarcerated /INCISIONAL HERNIA REPAIR with mesh;  Surgeon: Kody Diaz MD;  Location: [facility];  Service: General   • WISDOM TOOTH EXTRACTION N/A 1980    Dr. Wong       Prior to Admission medications    Medication Sig Start Date End Date Taking? Authorizing Provider   apixaban (ELIQUIS) 5 MG tablet tablet Take 2 tablets by mouth Every 12 (Twelve) Hours. Take 10mg BID for 1 week, then 5mg BID 12/12/18   Nakul Hinds Jr., MD   varenicline (CHANTIX STARTING MONTH PAK) 0.5 MG X 11 & 1 MG X 42 tablet Take 0.5 mg one daily on days 1-2 and 0.5 mg twice daily on days 4-7. Then 1 mg twice daily for a total of 12 weeks. 4/9/19   Nakul Hinds Jr., MD       No Known Allergies    Family History   Problem Relation Age of Onset   • Diabetes Mother    • Heart disease Mother         chf   • Heart disease Father    • No Known Problems Sister    • No  Known Problems Brother    • No Known Problems Daughter    • No Known Problems Son    • No Known Problems Maternal Grandmother    • No Known Problems Paternal Grandmother    • No Known Problems Maternal Aunt    • No Known Problems Paternal Aunt    • BRCA 1/2 Neg Hx    • Breast cancer Neg Hx    • Colon cancer Neg Hx    • Endometrial cancer Neg Hx    • Ovarian cancer Neg Hx        OBJECTIVE:    Patient's vital signs reviewed. No acute distress.     Chest is clear, no wheezing or rales. Normal symmetric air entry throughout both lung fields. No chest wall deformities or tenderness.    S1 and S2 normal, no murmurs, clicks, gallops or rubs. Regular rate and rhythm. Chest is clear; no wheezes or rales. No edema or JVD.    The abdomen is soft without tenderness, guarding, mass, rebound or organomegaly. Bowel sounds are normal. No CVA tenderness or inguinal adenopathy noted.    Patient is alert, oriented and with an intact memory.    Assessment: History of colon polyps.       Plan: Colonoscopy.

## 2019-04-15 NOTE — ANESTHESIA PREPROCEDURE EVALUATION
Anesthesia Evaluation     Patient summary reviewed and Nursing notes reviewed   NPO Solid Status: > 8 hours  NPO Liquid Status: > 2 hours           Airway   Mallampati: II  TM distance: >3 FB  Neck ROM: full  Dental - normal exam     Pulmonary - normal exam   (+) a smoker Current Smoked day of surgery,   Cardiovascular - normal exam    (+) DVT, hyperlipidemia,       Neuro/Psych- negative ROS  GI/Hepatic/Renal/Endo - negative ROS     Musculoskeletal (-) negative ROS    Abdominal    Substance History - negative use     OB/GYN negative ob/gyn ROS         Other                        Anesthesia Plan    ASA 3     MAC     Anesthetic plan, all risks, benefits, and alternatives have been provided, discussed and informed consent has been obtained with: patient.

## 2019-04-16 LAB
CYTO UR: NORMAL
LAB AP CASE REPORT: NORMAL
PATH REPORT.FINAL DX SPEC: NORMAL
PATH REPORT.GROSS SPEC: NORMAL

## 2019-06-21 ENCOUNTER — TELEPHONE (OUTPATIENT)
Dept: GASTROENTEROLOGY | Facility: CLINIC | Age: 59
End: 2019-06-21

## 2019-06-21 NOTE — TELEPHONE ENCOUNTER
Patient  called concerned about her bill for colonoscopy with polypectomy.explained to her Dr. Anne can not change ICD 10 Codes. She has applied for finciacal assistance at the hospital. She is waiitng to see if they can help her. As she is unemployed at this time.

## 2019-08-30 ENCOUNTER — TELEPHONE (OUTPATIENT)
Dept: FAMILY MEDICINE CLINIC | Facility: CLINIC | Age: 59
End: 2019-08-30

## 2019-09-26 ENCOUNTER — TELEPHONE (OUTPATIENT)
Dept: FAMILY MEDICINE CLINIC | Facility: CLINIC | Age: 59
End: 2019-09-26

## 2019-09-26 NOTE — TELEPHONE ENCOUNTER
We can date it only for the date signed, if that doesn't work she will have to bring back  Lm informing pt

## 2019-09-26 NOTE — TELEPHONE ENCOUNTER
PATIENT DROPPED OFF HANDICAP FORM YESTERDAY AND WANTS TO KNOW IF IT CAN BE DATED FOR 19. PATIENTS HANDICAP STICKER DOESN'T  UNTIL November

## 2022-11-10 ENCOUNTER — OFFICE VISIT (OUTPATIENT)
Dept: SURGERY | Facility: CLINIC | Age: 62
End: 2022-11-10

## 2022-11-10 VITALS — WEIGHT: 215.6 LBS | BODY MASS INDEX: 33.84 KG/M2 | HEIGHT: 67 IN

## 2022-11-10 DIAGNOSIS — K43.2 INCISIONAL HERNIA, WITHOUT OBSTRUCTION OR GANGRENE: Primary | ICD-10-CM

## 2022-11-10 PROCEDURE — 99204 OFFICE O/P NEW MOD 45 MIN: CPT | Performed by: SURGERY

## 2022-11-10 RX ORDER — ONDANSETRON 2 MG/ML
4 INJECTION INTRAMUSCULAR; INTRAVENOUS EVERY 6 HOURS PRN
Status: CANCELLED | OUTPATIENT
Start: 2022-11-10

## 2022-11-10 RX ORDER — ACETAMINOPHEN 325 MG/1
650 TABLET ORAL ONCE
Status: CANCELLED | OUTPATIENT
Start: 2022-11-10 | End: 2022-11-10

## 2022-11-10 RX ORDER — SCOLOPAMINE TRANSDERMAL SYSTEM 1 MG/1
1 PATCH, EXTENDED RELEASE TRANSDERMAL CONTINUOUS
Status: CANCELLED | OUTPATIENT
Start: 2022-11-10 | End: 2022-11-13

## 2022-11-10 RX ORDER — CELECOXIB 100 MG/1
200 CAPSULE ORAL ONCE
Status: CANCELLED | OUTPATIENT
Start: 2022-11-10 | End: 2022-11-10

## 2022-11-10 RX ORDER — LISINOPRIL 30 MG/1
30 TABLET ORAL DAILY
COMMUNITY
Start: 2022-09-28

## 2022-11-11 NOTE — PROGRESS NOTES
Date: 2022   Patient Name: Mariana Loya   Medical Record #: [id number]   : 1960  Age: 62 y.o.  Sex: female    Reason for Visit  Chief Complaint   Patient presents with   • Follow-up to discuss incisional hernia repair        History of Present Illness:     Mariana Loya is a 62 y.o. female who presents with a incisional hernia.  The patient has history of laparoscopic cholecystectomy after while she developed incisional hernia at the supraumbilical as well as the epigastric incision.  She underwent emergent incisional hernia repair with mesh placement on  when she came to the hospital with small bowel incarceration.  She has been doing great without any complaint.  She has been noticing increase in the size of her epigastric hernia.  She reports mild symptoms of mostly discomfort.  Patient continues to smoke.    Past Medical History    Patient Active Problem List   Diagnosis   • Hyperlipidemia   • Smoker   • Incisional hernia, without obstruction or gangrene   • MRSA exposure   • Superficial thrombophlebitis   • Declined smoking cessation   • History of colon polyps       Past Surgical History    Past Surgical History:   Procedure Laterality Date   • CHOLECYSTECTOMY     • COLONOSCOPY      Dr. Anne   • COLONOSCOPY N/A 04/15/2019    Procedure: COLONOSCOPY into cecum and TI with hot snare polypectomy with 10  cc saline lift;  Surgeon: Eddi Anne MD;  Location: [facility] ENDOSCOPY;  Service: Gastroenterology   • COLONOSCOPY W/ POLYPECTOMY N/A 10/07/2013    One 10 mm polyp in the proximal ascending colon: resected and retrieved, diverticulosis in the sigmoid colon and in the descending colon-Dr. Eddi Anne    • EYE SURGERY  Renal tear laser surgery   • HERNIA REPAIR     • KNEE ARTHROSCOPY W/ MENISCECTOMY Left 2006    Left knee arthroscopy with complete lateral meniscectomy-Dr. Ming Thorpe   • LAPAROSCOPIC CHOLECYSTECTOMY W/ CHOLANGIOGRAPHY N/A 2001    Dr. Lucy Levi   •  TONSILLECTOMY AND ADENOIDECTOMY N/A    • VENTRAL/INCISIONAL HERNIA REPAIR N/A 09/08/2018    Procedure: incarcerated /INCISIONAL HERNIA REPAIR with mesh;  Surgeon: Kody Diaz MD;  Location: [facility] OR;  Service: General   • WISDOM TOOTH EXTRACTION N/A 1980    Dr. Wong       Allergies    No Known Allergies    Medications  Current Outpatient Medications   Medication Sig Dispense Refill   • lisinopril (PRINIVIL,ZESTRIL) 30 MG tablet Take 1 tablet by mouth Daily.       No current facility-administered medications for this visit.         Social History  Social History     Socioeconomic History   • Marital status:      Spouse name: Orlando   Tobacco Use   • Smoking status: Every Day     Packs/day: 1.00     Years: 50.00     Pack years: 50.00     Types: Cigarettes   • Smokeless tobacco: Never   Vaping Use   • Vaping Use: Never used   Substance and Sexual Activity   • Alcohol use: No   • Drug use: No   • Sexual activity: Defer       Family History  Family History   Problem Relation Age of Onset   • Diabetes Mother    • Heart disease Mother         chf   • Arthritis Mother    • Hypertension Mother    • Heart disease Father    • Early death Father         Heart Attack   • No Known Problems Sister    • No Known Problems Brother    • No Known Problems Daughter    • No Known Problems Son    • No Known Problems Maternal Grandmother    • No Known Problems Paternal Grandmother    • No Known Problems Maternal Aunt    • No Known Problems Paternal Aunt    • BRCA 1/2 Neg Hx    • Breast cancer Neg Hx    • Colon cancer Neg Hx    • Endometrial cancer Neg Hx    • Ovarian cancer Neg Hx          Review of Systems      REVIEW OF SYSTEMS    CONSTITUTIONAL: Denies fevers, chills, unintentional weight loss or weight gain  RESPIRATORY: Denies chronic cough or sob.   CARDIAC: Denies chest pain, palpitations, edema  GI: Denies dyspepsia, reflux, heartburn, nausea, vomiting, diarrhea or constipation  : Denies dysuria or  "hematuria.    MUSCULOSKELETAL: Denies muscle weakness and pain   NEURO: Denies chronic headaches.   ENDOCRINE: Denies significant heat or cold intolerance or history of thyroid problems.    DERM: no rashes,lesions or discharge.     Physical Exam  Ht 170.2 cm (67\")   Wt 97.8 kg (215 lb 9.6 oz)   BMI 33.77 kg/m²   Body mass index is 33.77 kg/m².  General: Alert, no acute distress  HEENT: normochephalic, atraumatic, no scleral icterus. Moist oral mucosa.   Lungs: clear to auscultation and percussion, no chest deformities noted.  Cardiovascular:  Regular rate and rhythm  Extremities: No clubbing cyanosis or edema  Skin: No rashes, moist and warm.   Neuro: alert and oriented, normal speech, cranial nerves 2-12 grossly intact, no focal deficits   Abdominal exam: soft, nontender, nondistended, no masses or organomegaly.  There is a moderate sized and partially reducible epigastric incisional hernia.  No evidence of supraumbilical hernia.    Medical Decision Making  Test Results:  Results for orders placed or performed during the hospital encounter of 04/15/19   Tissue Pathology Exam    Specimen: Large Intestine, Cecum; Polyp   Result Value Ref Range    Case Report       Surgical Pathology Report                         Case: [id number]                                  Authorizing Provider:  Eddi Anne MD       Collected:           04/15/2019 09:49 AM          Ordering Location:     [facility]  Received:            04/15/2019 11:12 AM                                 ENDO SUITES                                                                  Pathologist:           Ming Lentz MD                                                         Specimen:    Large Intestine, Cecum, cecal polyp                                                        Final Diagnosis       1.  Cecum, Biopsy:  Sessile serrated adenoma.    nelda/bb      Gross Description       1. The specimen is received in formalin labeled with " the patient's name and further designated 'Large Intestine Cecum biopsy' is a small fragment of gray-tan tissue. The specimen is submitted for embedding as received.        Microscopic Description       Microscopic performed, incorporated in diagnosis.        CT scan abdomen pelvis from 9/8/2018 was reviewed by me.  There is approximately 4 cm epigastric defect with rectus muscle diastases.  At that time there was supra umbilical hernia with incarcerated small bowel producing small bowel obstruction    Impression:  This is a 62 y.o. female patient with a chief complaint of an incisional hernia.   We discussed with the patient about treatment options.   I recommend that she undergo robotic assisted laparoscopic incisional hernia repair with mesh placement.  Risk and benefits of procedure including bleeding, infection, mesh complication, chronic pain, intra-abdominal injuries discussed in detail with the patient that verbalized understanding and agree with the plan.  Discussed with her about the need to lose weight to decrease the risk of hernia recurrence  I recommend that she gets cardiac evaluation due to her longstanding history of smoking as well as her sedentary job.    Plan:  Weight loss  Cardiac evaluation  Robotic-assisted laparoscopic incisional hernia repair with mesh placement.  Surgical scheduling started    Kody Diaz MD  General, Minimally Invasive and Endoscopic Surgery  [facility]    [address], [address]  [place], [place], [address]  P: [phone]  F: [phone]

## 2022-11-24 ENCOUNTER — APPOINTMENT (OUTPATIENT)
Dept: GENERAL RADIOLOGY | Facility: HOSPITAL | Age: 62
End: 2022-11-24

## 2022-11-24 ENCOUNTER — HOSPITAL ENCOUNTER (INPATIENT)
Facility: HOSPITAL | Age: 62
LOS: 9 days | Discharge: HOME | End: 2022-12-03
Attending: EMERGENCY MEDICINE | Admitting: INTERNAL MEDICINE

## 2022-11-24 ENCOUNTER — APPOINTMENT (OUTPATIENT)
Dept: CT IMAGING | Facility: HOSPITAL | Age: 62
End: 2022-11-24

## 2022-11-24 DIAGNOSIS — I26.09 OTHER ACUTE PULMONARY EMBOLISM WITH ACUTE COR PULMONALE: ICD-10-CM

## 2022-11-24 DIAGNOSIS — J96.01 ACUTE RESPIRATORY FAILURE WITH HYPOXIA: ICD-10-CM

## 2022-11-24 DIAGNOSIS — J18.9 PNEUMONIA OF BOTH LUNGS DUE TO INFECTIOUS ORGANISM, UNSPECIFIED PART OF LUNG: Primary | ICD-10-CM

## 2022-11-24 DIAGNOSIS — J43.9 PULMONARY EMPHYSEMA, UNSPECIFIED EMPHYSEMA TYPE: ICD-10-CM

## 2022-11-24 PROBLEM — I26.99 ACUTE PULMONARY EMBOLISM: Status: ACTIVE | Noted: 2022-11-24

## 2022-11-24 LAB
ALBUMIN SERPL-MCNC: 3.8 G/DL (ref 3.5–5.2)
ALBUMIN/GLOB SERPL: 1.2 G/DL
ALP SERPL-CCNC: 86 U/L (ref 39–117)
ALT SERPL W P-5'-P-CCNC: 34 U/L (ref 1–33)
ANION GAP SERPL CALCULATED.3IONS-SCNC: 12 MMOL/L (ref 5–15)
APTT PPP: 26.1 SECONDS (ref 22.7–35.4)
APTT PPP: 54 SECONDS (ref 22.7–35.4)
AST SERPL-CCNC: 31 U/L (ref 1–32)
BASOPHILS # BLD AUTO: 0.02 10*3/MM3 (ref 0–0.2)
BASOPHILS NFR BLD AUTO: 0.2 % (ref 0–1.5)
BILIRUB SERPL-MCNC: 0.7 MG/DL (ref 0–1.2)
BUN SERPL-MCNC: 14 MG/DL (ref 8–23)
BUN/CREAT SERPL: 16.1 (ref 7–25)
CALCIUM SPEC-SCNC: 9.1 MG/DL (ref 8.6–10.5)
CHLORIDE SERPL-SCNC: 106 MMOL/L (ref 98–107)
CO2 SERPL-SCNC: 22 MMOL/L (ref 22–29)
CREAT SERPL-MCNC: 0.87 MG/DL (ref 0.57–1)
D-LACTATE SERPL-SCNC: 1.1 MMOL/L (ref 0.5–2)
DEPRECATED RDW RBC AUTO: 44.2 FL (ref 37–54)
EGFRCR SERPLBLD CKD-EPI 2021: 75 ML/MIN/1.73
EOSINOPHIL # BLD AUTO: 0.07 10*3/MM3 (ref 0–0.4)
EOSINOPHIL NFR BLD AUTO: 0.6 % (ref 0.3–6.2)
ERYTHROCYTE [DISTWIDTH] IN BLOOD BY AUTOMATED COUNT: 12.8 % (ref 12.3–15.4)
FLUAV RNA RESP QL NAA+PROBE: NOT DETECTED
FLUBV RNA RESP QL NAA+PROBE: NOT DETECTED
GLOBULIN UR ELPH-MCNC: 3.3 GM/DL
GLUCOSE SERPL-MCNC: 140 MG/DL (ref 65–99)
HCT VFR BLD AUTO: 41.2 % (ref 34–46.6)
HGB BLD-MCNC: 13.8 G/DL (ref 12–15.9)
HOLD SPECIMEN: NORMAL
IMM GRANULOCYTES # BLD AUTO: 0.06 10*3/MM3 (ref 0–0.05)
IMM GRANULOCYTES NFR BLD AUTO: 0.5 % (ref 0–0.5)
INR PPP: 0.99 (ref 0.9–1.1)
LYMPHOCYTES # BLD AUTO: 1.52 10*3/MM3 (ref 0.7–3.1)
LYMPHOCYTES NFR BLD AUTO: 12.9 % (ref 19.6–45.3)
MCH RBC QN AUTO: 31.2 PG (ref 26.6–33)
MCHC RBC AUTO-ENTMCNC: 33.5 G/DL (ref 31.5–35.7)
MCV RBC AUTO: 93 FL (ref 79–97)
MONOCYTES # BLD AUTO: 0.79 10*3/MM3 (ref 0.1–0.9)
MONOCYTES NFR BLD AUTO: 6.7 % (ref 5–12)
NEUTROPHILS NFR BLD AUTO: 79.1 % (ref 42.7–76)
NEUTROPHILS NFR BLD AUTO: 9.32 10*3/MM3 (ref 1.7–7)
NRBC BLD AUTO-RTO: 0 /100 WBC (ref 0–0.2)
NT-PROBNP SERPL-MCNC: 121 PG/ML (ref 0–900)
PLATELET # BLD AUTO: 205 10*3/MM3 (ref 140–450)
PMV BLD AUTO: 10.6 FL (ref 6–12)
POTASSIUM SERPL-SCNC: 3.9 MMOL/L (ref 3.5–5.2)
PROCALCITONIN SERPL-MCNC: 0.09 NG/ML (ref 0–0.25)
PROT SERPL-MCNC: 7.1 G/DL (ref 6–8.5)
PROTHROMBIN TIME: 13.2 SECONDS (ref 11.7–14.2)
QT INTERVAL: 355 MS
RBC # BLD AUTO: 4.43 10*6/MM3 (ref 3.77–5.28)
RSV RNA NPH QL NAA+NON-PROBE: NOT DETECTED
SARS-COV-2 RNA RESP QL NAA+PROBE: NOT DETECTED
SODIUM SERPL-SCNC: 140 MMOL/L (ref 136–145)
TROPONIN T SERPL-MCNC: <0.01 NG/ML (ref 0–0.03)
WBC NRBC COR # BLD: 11.78 10*3/MM3 (ref 3.4–10.8)
WHOLE BLOOD HOLD COAG: NORMAL
WHOLE BLOOD HOLD SPECIMEN: NORMAL

## 2022-11-24 PROCEDURE — 93005 ELECTROCARDIOGRAM TRACING: CPT

## 2022-11-24 PROCEDURE — 71275 CT ANGIOGRAPHY CHEST: CPT

## 2022-11-24 PROCEDURE — 84145 PROCALCITONIN (PCT): CPT | Performed by: EMERGENCY MEDICINE

## 2022-11-24 PROCEDURE — 25010000002 HEPARIN (PORCINE) 25000-0.45 UT/250ML-% SOLUTION: Performed by: EMERGENCY MEDICINE

## 2022-11-24 PROCEDURE — 87040 BLOOD CULTURE FOR BACTERIA: CPT | Performed by: EMERGENCY MEDICINE

## 2022-11-24 PROCEDURE — 0 IOPAMIDOL PER 1 ML: Performed by: EMERGENCY MEDICINE

## 2022-11-24 PROCEDURE — 85025 COMPLETE CBC W/AUTO DIFF WBC: CPT

## 2022-11-24 PROCEDURE — 80053 COMPREHEN METABOLIC PANEL: CPT

## 2022-11-24 PROCEDURE — 83605 ASSAY OF LACTIC ACID: CPT | Performed by: EMERGENCY MEDICINE

## 2022-11-24 PROCEDURE — 84484 ASSAY OF TROPONIN QUANT: CPT

## 2022-11-24 PROCEDURE — 83880 ASSAY OF NATRIURETIC PEPTIDE: CPT

## 2022-11-24 PROCEDURE — 93010 ELECTROCARDIOGRAM REPORT: CPT | Performed by: INTERNAL MEDICINE

## 2022-11-24 PROCEDURE — 25010000002 CEFTRIAXONE PER 250 MG: Performed by: EMERGENCY MEDICINE

## 2022-11-24 PROCEDURE — 25010000002 MORPHINE PER 10 MG: Performed by: INTERNAL MEDICINE

## 2022-11-24 PROCEDURE — 85610 PROTHROMBIN TIME: CPT | Performed by: EMERGENCY MEDICINE

## 2022-11-24 PROCEDURE — 99285 EMERGENCY DEPT VISIT HI MDM: CPT

## 2022-11-24 PROCEDURE — 71046 X-RAY EXAM CHEST 2 VIEWS: CPT

## 2022-11-24 PROCEDURE — 87637 SARSCOV2&INF A&B&RSV AMP PRB: CPT | Performed by: NURSE PRACTITIONER

## 2022-11-24 PROCEDURE — 25010000002 HEPARIN (PORCINE) PER 1000 UNITS: Performed by: INTERNAL MEDICINE

## 2022-11-24 PROCEDURE — 85730 THROMBOPLASTIN TIME PARTIAL: CPT | Performed by: EMERGENCY MEDICINE

## 2022-11-24 PROCEDURE — 85730 THROMBOPLASTIN TIME PARTIAL: CPT | Performed by: INTERNAL MEDICINE

## 2022-11-24 PROCEDURE — 25010000002 HEPARIN (PORCINE) PER 1000 UNITS: Performed by: EMERGENCY MEDICINE

## 2022-11-24 RX ORDER — ACETAMINOPHEN 650 MG/1
650 SUPPOSITORY RECTAL EVERY 4 HOURS PRN
Status: DISCONTINUED | OUTPATIENT
Start: 2022-11-24 | End: 2022-12-03 | Stop reason: HOSPADM

## 2022-11-24 RX ORDER — ACETAMINOPHEN 160 MG/5ML
650 SOLUTION ORAL EVERY 4 HOURS PRN
Status: DISCONTINUED | OUTPATIENT
Start: 2022-11-24 | End: 2022-12-03 | Stop reason: HOSPADM

## 2022-11-24 RX ORDER — ACETAMINOPHEN 325 MG/1
650 TABLET ORAL EVERY 4 HOURS PRN
Status: DISCONTINUED | OUTPATIENT
Start: 2022-11-24 | End: 2022-12-03 | Stop reason: HOSPADM

## 2022-11-24 RX ORDER — SODIUM CHLORIDE 9 MG/ML
40 INJECTION, SOLUTION INTRAVENOUS AS NEEDED
Status: DISCONTINUED | OUTPATIENT
Start: 2022-11-24 | End: 2022-12-03 | Stop reason: HOSPADM

## 2022-11-24 RX ORDER — NALOXONE HCL 0.4 MG/ML
0.4 VIAL (ML) INJECTION
Status: DISCONTINUED | OUTPATIENT
Start: 2022-11-24 | End: 2022-12-03 | Stop reason: HOSPADM

## 2022-11-24 RX ORDER — HEPARIN SODIUM 10000 [USP'U]/100ML
15.4 INJECTION, SOLUTION INTRAVENOUS
Status: DISCONTINUED | OUTPATIENT
Start: 2022-11-24 | End: 2022-12-01

## 2022-11-24 RX ORDER — SODIUM CHLORIDE 0.9 % (FLUSH) 0.9 %
10 SYRINGE (ML) INJECTION AS NEEDED
Status: DISCONTINUED | OUTPATIENT
Start: 2022-11-24 | End: 2022-12-03 | Stop reason: HOSPADM

## 2022-11-24 RX ORDER — SODIUM CHLORIDE 0.9 % (FLUSH) 0.9 %
10 SYRINGE (ML) INJECTION EVERY 12 HOURS SCHEDULED
Status: DISCONTINUED | OUTPATIENT
Start: 2022-11-24 | End: 2022-12-03 | Stop reason: HOSPADM

## 2022-11-24 RX ORDER — MORPHINE SULFATE 2 MG/ML
2 INJECTION, SOLUTION INTRAMUSCULAR; INTRAVENOUS EVERY 4 HOURS PRN
Status: DISPENSED | OUTPATIENT
Start: 2022-11-24 | End: 2022-12-01

## 2022-11-24 RX ORDER — ONDANSETRON 2 MG/ML
4 INJECTION INTRAMUSCULAR; INTRAVENOUS EVERY 6 HOURS PRN
Status: DISCONTINUED | OUTPATIENT
Start: 2022-11-24 | End: 2022-12-03 | Stop reason: HOSPADM

## 2022-11-24 RX ORDER — NITROGLYCERIN 0.4 MG/1
0.4 TABLET SUBLINGUAL
Status: DISCONTINUED | OUTPATIENT
Start: 2022-11-24 | End: 2022-12-03 | Stop reason: HOSPADM

## 2022-11-24 RX ORDER — MORPHINE SULFATE 2 MG/ML
1 INJECTION, SOLUTION INTRAMUSCULAR; INTRAVENOUS EVERY 4 HOURS PRN
Status: DISCONTINUED | OUTPATIENT
Start: 2022-11-24 | End: 2022-11-24

## 2022-11-24 RX ORDER — NALOXONE HCL 0.4 MG/ML
0.4 VIAL (ML) INJECTION
Status: DISCONTINUED | OUTPATIENT
Start: 2022-11-24 | End: 2022-11-24

## 2022-11-24 RX ORDER — HEPARIN SODIUM 5000 [USP'U]/ML
80 INJECTION, SOLUTION INTRAVENOUS; SUBCUTANEOUS ONCE
Status: COMPLETED | OUTPATIENT
Start: 2022-11-24 | End: 2022-11-24

## 2022-11-24 RX ORDER — HEPARIN SODIUM 5000 [USP'U]/ML
40-80 INJECTION, SOLUTION INTRAVENOUS; SUBCUTANEOUS EVERY 6 HOURS PRN
Status: DISCONTINUED | OUTPATIENT
Start: 2022-11-24 | End: 2022-11-28

## 2022-11-24 RX ADMIN — SODIUM CHLORIDE 500 ML: 9 INJECTION, SOLUTION INTRAVENOUS at 12:03

## 2022-11-24 RX ADMIN — LISINOPRIL 30 MG: 20 TABLET ORAL at 16:49

## 2022-11-24 RX ADMIN — MORPHINE SULFATE 1 MG: 2 INJECTION, SOLUTION INTRAMUSCULAR; INTRAVENOUS at 20:27

## 2022-11-24 RX ADMIN — IOPAMIDOL 95 ML: 755 INJECTION, SOLUTION INTRAVENOUS at 13:59

## 2022-11-24 RX ADMIN — CEFTRIAXONE 2 G: 2 INJECTION, POWDER, FOR SOLUTION INTRAMUSCULAR; INTRAVENOUS at 13:47

## 2022-11-24 RX ADMIN — MORPHINE SULFATE 1 MG: 2 INJECTION, SOLUTION INTRAMUSCULAR; INTRAVENOUS at 16:49

## 2022-11-24 RX ADMIN — MORPHINE SULFATE 2 MG: 2 INJECTION, SOLUTION INTRAMUSCULAR; INTRAVENOUS at 23:56

## 2022-11-24 RX ADMIN — HEPARIN SODIUM 7800 UNITS: 5000 INJECTION INTRAVENOUS; SUBCUTANEOUS at 14:30

## 2022-11-24 RX ADMIN — HEPARIN SODIUM 15.4 UNITS/KG/HR: 10000 INJECTION, SOLUTION INTRAVENOUS at 14:30

## 2022-11-24 RX ADMIN — HEPARIN SODIUM 7800 UNITS: 5000 INJECTION INTRAVENOUS; SUBCUTANEOUS at 21:57

## 2022-11-24 RX ADMIN — Medication 10 ML: at 20:30

## 2022-11-25 ENCOUNTER — APPOINTMENT (OUTPATIENT)
Dept: GENERAL RADIOLOGY | Facility: HOSPITAL | Age: 62
End: 2022-11-25

## 2022-11-25 ENCOUNTER — APPOINTMENT (OUTPATIENT)
Dept: CARDIOLOGY | Facility: HOSPITAL | Age: 62
End: 2022-11-25

## 2022-11-25 PROBLEM — E78.5 HYPERLIPIDEMIA: Status: ACTIVE | Noted: 2022-11-25

## 2022-11-25 PROBLEM — R79.89 ELEVATED LIVER FUNCTION TESTS: Status: ACTIVE | Noted: 2022-11-25

## 2022-11-25 PROBLEM — J44.9 COPD (CHRONIC OBSTRUCTIVE PULMONARY DISEASE): Status: ACTIVE | Noted: 2022-11-25

## 2022-11-25 PROBLEM — R73.9 HYPERGLYCEMIA: Status: ACTIVE | Noted: 2022-11-25

## 2022-11-25 PROBLEM — Z86.718 HISTORY OF DVT (DEEP VEIN THROMBOSIS): Status: ACTIVE | Noted: 2022-11-25

## 2022-11-25 LAB
ALBUMIN SERPL-MCNC: 3.5 G/DL (ref 3.5–5.2)
ALBUMIN/GLOB SERPL: 1.1 G/DL
ALP SERPL-CCNC: 94 U/L (ref 39–117)
ALT SERPL W P-5'-P-CCNC: 41 U/L (ref 1–33)
ANION GAP SERPL CALCULATED.3IONS-SCNC: 11 MMOL/L (ref 5–15)
APTT PPP: 123 SECONDS (ref 22.7–35.4)
APTT PPP: 46.3 SECONDS (ref 22.7–35.4)
APTT PPP: 52.7 SECONDS (ref 22.7–35.4)
APTT PPP: 87.5 SECONDS (ref 22.7–35.4)
ARTERIAL PATENCY WRIST A: POSITIVE
ASCENDING AORTA: 2.9 CM
AST SERPL-CCNC: 40 U/L (ref 1–32)
ATMOSPHERIC PRESS: 749.9 MMHG
BASE EXCESS BLDA CALC-SCNC: -2.6 MMOL/L (ref 0–2)
BASOPHILS # BLD AUTO: 0.03 10*3/MM3 (ref 0–0.2)
BASOPHILS NFR BLD AUTO: 0.3 % (ref 0–1.5)
BDY SITE: ABNORMAL
BH CV ECHO MEAS - ACS: 1.72 CM
BH CV ECHO MEAS - AO MAX PG: 11 MMHG
BH CV ECHO MEAS - AO MEAN PG: 5.8 MMHG
BH CV ECHO MEAS - AO ROOT DIAM: 3.4 CM
BH CV ECHO MEAS - AO V2 MAX: 165.6 CM/SEC
BH CV ECHO MEAS - AO V2 VTI: 32.1 CM
BH CV ECHO MEAS - AVA(I,D): 2.18 CM2
BH CV ECHO MEAS - EDV(CUBED): 32.9 ML
BH CV ECHO MEAS - EDV(MOD-SP2): 61 ML
BH CV ECHO MEAS - EDV(MOD-SP4): 81 ML
BH CV ECHO MEAS - EF(MOD-BP): 54.4 %
BH CV ECHO MEAS - EF(MOD-SP2): 60.7 %
BH CV ECHO MEAS - EF(MOD-SP4): 51.9 %
BH CV ECHO MEAS - ESV(CUBED): 15.1 ML
BH CV ECHO MEAS - ESV(MOD-SP2): 24 ML
BH CV ECHO MEAS - ESV(MOD-SP4): 39 ML
BH CV ECHO MEAS - FS: 22.8 %
BH CV ECHO MEAS - IVS/LVPW: 0.99 CM
BH CV ECHO MEAS - IVSD: 1.18 CM
BH CV ECHO MEAS - LV DIASTOLIC VOL/BSA (35-75): 38.8 CM2
BH CV ECHO MEAS - LV MASS(C)D: 118.3 GRAMS
BH CV ECHO MEAS - LV MAX PG: 4.4 MMHG
BH CV ECHO MEAS - LV MEAN PG: 2.27 MMHG
BH CV ECHO MEAS - LV SYSTOLIC VOL/BSA (12-30): 18.7 CM2
BH CV ECHO MEAS - LV V1 MAX: 104.5 CM/SEC
BH CV ECHO MEAS - LV V1 VTI: 21.1 CM
BH CV ECHO MEAS - LVIDD: 3.2 CM
BH CV ECHO MEAS - LVIDS: 2.47 CM
BH CV ECHO MEAS - LVOT AREA: 3.3 CM2
BH CV ECHO MEAS - LVOT DIAM: 2.05 CM
BH CV ECHO MEAS - LVPWD: 1.2 CM
BH CV ECHO MEAS - MV A DUR: 0.1 SEC
BH CV ECHO MEAS - MV A MAX VEL: 123.6 CM/SEC
BH CV ECHO MEAS - MV DEC SLOPE: 815.3 CM/SEC2
BH CV ECHO MEAS - MV DEC TIME: 0.15 MSEC
BH CV ECHO MEAS - MV E MAX VEL: 96.1 CM/SEC
BH CV ECHO MEAS - MV E/A: 0.78
BH CV ECHO MEAS - MV MAX PG: 7.7 MMHG
BH CV ECHO MEAS - MV MEAN PG: 4 MMHG
BH CV ECHO MEAS - MV P1/2T: 45.3 MSEC
BH CV ECHO MEAS - MV V2 VTI: 20.1 CM
BH CV ECHO MEAS - MVA(P1/2T): 4.9 CM2
BH CV ECHO MEAS - MVA(VTI): 3.5 CM2
BH CV ECHO MEAS - PA ACC TIME: 0.13 SEC
BH CV ECHO MEAS - PA PR(ACCEL): 18.4 MMHG
BH CV ECHO MEAS - PA V2 MAX: 93.7 CM/SEC
BH CV ECHO MEAS - PULM A REVS DUR: 0.11 SEC
BH CV ECHO MEAS - PULM A REVS VEL: 32.1 CM/SEC
BH CV ECHO MEAS - PULM DIAS VEL: 28.7 CM/SEC
BH CV ECHO MEAS - PULM S/D: 1.29
BH CV ECHO MEAS - PULM SYS VEL: 37 CM/SEC
BH CV ECHO MEAS - RV MAX PG: 2.41 MMHG
BH CV ECHO MEAS - RV V1 MAX: 77.6 CM/SEC
BH CV ECHO MEAS - RV V1 VTI: 15 CM
BH CV ECHO MEAS - SI(MOD-SP2): 17.7 ML/M2
BH CV ECHO MEAS - SI(MOD-SP4): 20.1 ML/M2
BH CV ECHO MEAS - SV(LVOT): 69.8 ML
BH CV ECHO MEAS - SV(MOD-SP2): 37 ML
BH CV ECHO MEAS - SV(MOD-SP4): 42 ML
BH CV LOW VAS LEFT GREATER SAPH AK VESSEL: 1
BH CV LOW VAS LEFT GREATER SAPH BK VESSEL: 1
BH CV LOW VAS LEFT MID FEMORAL SPONT: 1
BH CV LOW VAS RIGHT PERONEAL VESSEL: 1
BH CV LOW VAS RIGHT POPLITEAL SPONT: 1
BH CV LOW VAS RIGHT SOLEAL VESSEL: 1
BH CV LOWER VASCULAR LEFT COMMON FEMORAL AUGMENT: NORMAL
BH CV LOWER VASCULAR LEFT COMMON FEMORAL COMPETENT: NORMAL
BH CV LOWER VASCULAR LEFT COMMON FEMORAL COMPRESS: NORMAL
BH CV LOWER VASCULAR LEFT COMMON FEMORAL PHASIC: NORMAL
BH CV LOWER VASCULAR LEFT COMMON FEMORAL SPONT: NORMAL
BH CV LOWER VASCULAR LEFT DISTAL FEMORAL COMPRESS: NORMAL
BH CV LOWER VASCULAR LEFT GASTRONEMIUS COMPRESS: NORMAL
BH CV LOWER VASCULAR LEFT GREATER SAPH AK COMPRESS: NORMAL
BH CV LOWER VASCULAR LEFT GREATER SAPH AK THROMBUS: NORMAL
BH CV LOWER VASCULAR LEFT GREATER SAPH BK COMPRESS: NORMAL
BH CV LOWER VASCULAR LEFT GREATER SAPH BK THROMBUS: NORMAL
BH CV LOWER VASCULAR LEFT LESSER SAPH COMPRESS: NORMAL
BH CV LOWER VASCULAR LEFT MID FEMORAL AUGMENT: NORMAL
BH CV LOWER VASCULAR LEFT MID FEMORAL COMPETENT: NORMAL
BH CV LOWER VASCULAR LEFT MID FEMORAL COMPRESS: NORMAL
BH CV LOWER VASCULAR LEFT MID FEMORAL PHASIC: NORMAL
BH CV LOWER VASCULAR LEFT MID FEMORAL SPONT: NORMAL
BH CV LOWER VASCULAR LEFT PERONEAL COMPRESS: NORMAL
BH CV LOWER VASCULAR LEFT POPLITEAL AUGMENT: NORMAL
BH CV LOWER VASCULAR LEFT POPLITEAL COMPETENT: NORMAL
BH CV LOWER VASCULAR LEFT POPLITEAL COMPRESS: NORMAL
BH CV LOWER VASCULAR LEFT POPLITEAL PHASIC: NORMAL
BH CV LOWER VASCULAR LEFT POPLITEAL SPONT: NORMAL
BH CV LOWER VASCULAR LEFT POSTERIOR TIBIAL COMPRESS: NORMAL
BH CV LOWER VASCULAR LEFT PROFUNDA FEMORAL COMPRESS: NORMAL
BH CV LOWER VASCULAR LEFT PROXIMAL FEMORAL COMPRESS: NORMAL
BH CV LOWER VASCULAR LEFT SAPHENOFEMORAL JUNCTION COMPRESS: NORMAL
BH CV LOWER VASCULAR LEFT SOLEAL COMPRESS: NORMAL
BH CV LOWER VASCULAR RIGHT COMMON FEMORAL AUGMENT: NORMAL
BH CV LOWER VASCULAR RIGHT COMMON FEMORAL COMPETENT: NORMAL
BH CV LOWER VASCULAR RIGHT COMMON FEMORAL COMPRESS: NORMAL
BH CV LOWER VASCULAR RIGHT COMMON FEMORAL PHASIC: NORMAL
BH CV LOWER VASCULAR RIGHT COMMON FEMORAL SPONT: NORMAL
BH CV LOWER VASCULAR RIGHT DISTAL FEMORAL COMPRESS: NORMAL
BH CV LOWER VASCULAR RIGHT GASTRONEMIUS COMPRESS: NORMAL
BH CV LOWER VASCULAR RIGHT GREATER SAPH AK COMPRESS: NORMAL
BH CV LOWER VASCULAR RIGHT GREATER SAPH BK COMPRESS: NORMAL
BH CV LOWER VASCULAR RIGHT LESSER SAPH COMPRESS: NORMAL
BH CV LOWER VASCULAR RIGHT MID FEMORAL AUGMENT: NORMAL
BH CV LOWER VASCULAR RIGHT MID FEMORAL COMPETENT: NORMAL
BH CV LOWER VASCULAR RIGHT MID FEMORAL COMPRESS: NORMAL
BH CV LOWER VASCULAR RIGHT MID FEMORAL PHASIC: NORMAL
BH CV LOWER VASCULAR RIGHT MID FEMORAL SPONT: NORMAL
BH CV LOWER VASCULAR RIGHT PERONEAL COMPRESS: NORMAL
BH CV LOWER VASCULAR RIGHT PERONEAL THROMBUS: NORMAL
BH CV LOWER VASCULAR RIGHT POPLITEAL AUGMENT: NORMAL
BH CV LOWER VASCULAR RIGHT POPLITEAL COMPRESS: NORMAL
BH CV LOWER VASCULAR RIGHT POPLITEAL PHASIC: NORMAL
BH CV LOWER VASCULAR RIGHT POPLITEAL SPONT: NORMAL
BH CV LOWER VASCULAR RIGHT POPLITEAL THROMBUS: NORMAL
BH CV LOWER VASCULAR RIGHT POSTERIOR TIBIAL COMPRESS: NORMAL
BH CV LOWER VASCULAR RIGHT PROFUNDA FEMORAL COMPRESS: NORMAL
BH CV LOWER VASCULAR RIGHT PROXIMAL FEMORAL COMPRESS: NORMAL
BH CV LOWER VASCULAR RIGHT SAPHENOFEMORAL JUNCTION COMPRESS: NORMAL
BH CV LOWER VASCULAR RIGHT SOLEAL COMPRESS: NORMAL
BH CV LOWER VASCULAR RIGHT SOLEAL THROMBUS: NORMAL
BILIRUB SERPL-MCNC: 0.5 MG/DL (ref 0–1.2)
BUN SERPL-MCNC: 12 MG/DL (ref 8–23)
BUN/CREAT SERPL: 16.4 (ref 7–25)
CALCIUM SPEC-SCNC: 8.7 MG/DL (ref 8.6–10.5)
CHLORIDE SERPL-SCNC: 108 MMOL/L (ref 98–107)
CO2 SERPL-SCNC: 20 MMOL/L (ref 22–29)
CREAT SERPL-MCNC: 0.73 MG/DL (ref 0.57–1)
DEPRECATED RDW RBC AUTO: 41.7 FL (ref 37–54)
EGFRCR SERPLBLD CKD-EPI 2021: 92.5 ML/MIN/1.73
EOSINOPHIL # BLD AUTO: 0.04 10*3/MM3 (ref 0–0.4)
EOSINOPHIL NFR BLD AUTO: 0.3 % (ref 0.3–6.2)
ERYTHROCYTE [DISTWIDTH] IN BLOOD BY AUTOMATED COUNT: 12.4 % (ref 12.3–15.4)
GAS FLOW AIRWAY: 10 LPM
GLOBULIN UR ELPH-MCNC: 3.2 GM/DL
GLUCOSE SERPL-MCNC: 139 MG/DL (ref 65–99)
HBA1C MFR BLD: 6.2 % (ref 4.8–5.6)
HCO3 BLDA-SCNC: 21.9 MMOL/L (ref 22–28)
HCT VFR BLD AUTO: 39.2 % (ref 34–46.6)
HGB BLD-MCNC: 13.1 G/DL (ref 12–15.9)
IMM GRANULOCYTES # BLD AUTO: 0.08 10*3/MM3 (ref 0–0.05)
IMM GRANULOCYTES NFR BLD AUTO: 0.7 % (ref 0–0.5)
LEFT ATRIUM VOLUME INDEX: 19.1 ML/M2
LYMPHOCYTES # BLD AUTO: 2.14 10*3/MM3 (ref 0.7–3.1)
LYMPHOCYTES NFR BLD AUTO: 18 % (ref 19.6–45.3)
MAXIMAL PREDICTED HEART RATE: 157 BPM
MAXIMAL PREDICTED HEART RATE: 157 BPM
MCH RBC QN AUTO: 30.8 PG (ref 26.6–33)
MCHC RBC AUTO-ENTMCNC: 33.4 G/DL (ref 31.5–35.7)
MCV RBC AUTO: 92.2 FL (ref 79–97)
MODALITY: ABNORMAL
MONOCYTES # BLD AUTO: 0.97 10*3/MM3 (ref 0.1–0.9)
MONOCYTES NFR BLD AUTO: 8.2 % (ref 5–12)
NEUTROPHILS NFR BLD AUTO: 72.5 % (ref 42.7–76)
NEUTROPHILS NFR BLD AUTO: 8.61 10*3/MM3 (ref 1.7–7)
NRBC BLD AUTO-RTO: 0 /100 WBC (ref 0–0.2)
PCO2 BLDA: 36.6 MM HG (ref 35–45)
PH BLDA: 7.39 PH UNITS (ref 7.35–7.45)
PLATELET # BLD AUTO: 213 10*3/MM3 (ref 140–450)
PMV BLD AUTO: 10.9 FL (ref 6–12)
PO2 BLDA: 62.3 MM HG (ref 80–100)
POTASSIUM SERPL-SCNC: 4 MMOL/L (ref 3.5–5.2)
PROT S ACT/NOR PPP: 82 % (ref 70–127)
PROT SERPL-MCNC: 6.7 G/DL (ref 6–8.5)
RBC # BLD AUTO: 4.25 10*6/MM3 (ref 3.77–5.28)
SAO2 % BLDCOA: 91.3 % (ref 92–99)
SINUS: 2.9 CM
SODIUM SERPL-SCNC: 139 MMOL/L (ref 136–145)
STJ: 2.7 CM
STRESS TARGET HR: 133 BPM
STRESS TARGET HR: 133 BPM
TOTAL RATE: 24 BREATHS/MINUTE
WBC NRBC COR # BLD: 11.87 10*3/MM3 (ref 3.4–10.8)

## 2022-11-25 PROCEDURE — 85025 COMPLETE CBC W/AUTO DIFF WBC: CPT | Performed by: INTERNAL MEDICINE

## 2022-11-25 PROCEDURE — 85613 RUSSELL VIPER VENOM DILUTED: CPT | Performed by: INTERNAL MEDICINE

## 2022-11-25 PROCEDURE — 25010000002 MORPHINE PER 10 MG: Performed by: INTERNAL MEDICINE

## 2022-11-25 PROCEDURE — 94640 AIRWAY INHALATION TREATMENT: CPT

## 2022-11-25 PROCEDURE — 25010000002 HEPARIN (PORCINE) 25000-0.45 UT/250ML-% SOLUTION: Performed by: INTERNAL MEDICINE

## 2022-11-25 PROCEDURE — 94799 UNLISTED PULMONARY SVC/PX: CPT

## 2022-11-25 PROCEDURE — 93306 TTE W/DOPPLER COMPLETE: CPT

## 2022-11-25 PROCEDURE — 99222 1ST HOSP IP/OBS MODERATE 55: CPT | Performed by: INTERNAL MEDICINE

## 2022-11-25 PROCEDURE — 93306 TTE W/DOPPLER COMPLETE: CPT | Performed by: INTERNAL MEDICINE

## 2022-11-25 PROCEDURE — 85730 THROMBOPLASTIN TIME PARTIAL: CPT | Performed by: INTERNAL MEDICINE

## 2022-11-25 PROCEDURE — 85303 CLOT INHIBIT PROT C ACTIVITY: CPT | Performed by: INTERNAL MEDICINE

## 2022-11-25 PROCEDURE — 85670 THROMBIN TIME PLASMA: CPT | Performed by: INTERNAL MEDICINE

## 2022-11-25 PROCEDURE — 85732 THROMBOPLASTIN TIME PARTIAL: CPT | Performed by: INTERNAL MEDICINE

## 2022-11-25 PROCEDURE — 83520 IMMUNOASSAY QUANT NOS NONAB: CPT | Performed by: INTERNAL MEDICINE

## 2022-11-25 PROCEDURE — 36600 WITHDRAWAL OF ARTERIAL BLOOD: CPT

## 2022-11-25 PROCEDURE — 25010000002 PERFLUTREN (DEFINITY) 8.476 MG IN SODIUM CHLORIDE (PF) 0.9 % 10 ML INJECTION: Performed by: INTERNAL MEDICINE

## 2022-11-25 PROCEDURE — 94760 N-INVAS EAR/PLS OXIMETRY 1: CPT

## 2022-11-25 PROCEDURE — 82803 BLOOD GASES ANY COMBINATION: CPT

## 2022-11-25 PROCEDURE — 85300 ANTITHROMBIN III ACTIVITY: CPT | Performed by: INTERNAL MEDICINE

## 2022-11-25 PROCEDURE — 85220 BLOOC CLOT FACTOR V TEST: CPT | Performed by: INTERNAL MEDICINE

## 2022-11-25 PROCEDURE — 83036 HEMOGLOBIN GLYCOSYLATED A1C: CPT | Performed by: INTERNAL MEDICINE

## 2022-11-25 PROCEDURE — 86148 ANTI-PHOSPHOLIPID ANTIBODY: CPT | Performed by: INTERNAL MEDICINE

## 2022-11-25 PROCEDURE — 81240 F2 GENE: CPT | Performed by: INTERNAL MEDICINE

## 2022-11-25 PROCEDURE — 85306 CLOT INHIBIT PROT S FREE: CPT | Performed by: INTERNAL MEDICINE

## 2022-11-25 PROCEDURE — 71045 X-RAY EXAM CHEST 1 VIEW: CPT

## 2022-11-25 PROCEDURE — 85705 THROMBOPLASTIN INHIBITION: CPT | Performed by: INTERNAL MEDICINE

## 2022-11-25 PROCEDURE — 25010000002 FUROSEMIDE PER 20 MG: Performed by: INTERNAL MEDICINE

## 2022-11-25 PROCEDURE — 94664 DEMO&/EVAL PT USE INHALER: CPT

## 2022-11-25 PROCEDURE — 25010000002 HEPARIN (PORCINE) PER 1000 UNITS: Performed by: INTERNAL MEDICINE

## 2022-11-25 PROCEDURE — 81241 F5 GENE: CPT | Performed by: INTERNAL MEDICINE

## 2022-11-25 PROCEDURE — 86146 BETA-2 GLYCOPROTEIN ANTIBODY: CPT | Performed by: INTERNAL MEDICINE

## 2022-11-25 PROCEDURE — 93970 EXTREMITY STUDY: CPT

## 2022-11-25 PROCEDURE — 86038 ANTINUCLEAR ANTIBODIES: CPT | Performed by: INTERNAL MEDICINE

## 2022-11-25 PROCEDURE — 94761 N-INVAS EAR/PLS OXIMETRY MLT: CPT

## 2022-11-25 PROCEDURE — 86147 CARDIOLIPIN ANTIBODY EA IG: CPT | Performed by: INTERNAL MEDICINE

## 2022-11-25 PROCEDURE — 85730 THROMBOPLASTIN TIME PARTIAL: CPT | Performed by: NURSE PRACTITIONER

## 2022-11-25 PROCEDURE — 80053 COMPREHEN METABOLIC PANEL: CPT | Performed by: INTERNAL MEDICINE

## 2022-11-25 RX ORDER — LISINOPRIL 40 MG/1
40 TABLET ORAL DAILY
Status: DISCONTINUED | OUTPATIENT
Start: 2022-11-25 | End: 2022-12-03 | Stop reason: HOSPADM

## 2022-11-25 RX ORDER — FAMOTIDINE 20 MG/1
20 TABLET, FILM COATED ORAL
Status: DISCONTINUED | OUTPATIENT
Start: 2022-11-25 | End: 2022-11-30

## 2022-11-25 RX ORDER — POTASSIUM CHLORIDE 750 MG/1
40 TABLET, FILM COATED, EXTENDED RELEASE ORAL ONCE
Status: COMPLETED | OUTPATIENT
Start: 2022-11-25 | End: 2022-11-25

## 2022-11-25 RX ORDER — ALBUTEROL SULFATE 2.5 MG/3ML
2.5 SOLUTION RESPIRATORY (INHALATION) EVERY 6 HOURS PRN
Status: DISCONTINUED | OUTPATIENT
Start: 2022-11-25 | End: 2022-12-03 | Stop reason: HOSPADM

## 2022-11-25 RX ORDER — GUAIFENESIN 600 MG/1
600 TABLET, EXTENDED RELEASE ORAL EVERY 12 HOURS SCHEDULED
Status: DISCONTINUED | OUTPATIENT
Start: 2022-11-25 | End: 2022-12-03 | Stop reason: HOSPADM

## 2022-11-25 RX ORDER — IPRATROPIUM BROMIDE AND ALBUTEROL SULFATE 2.5; .5 MG/3ML; MG/3ML
3 SOLUTION RESPIRATORY (INHALATION) EVERY 6 HOURS PRN
Status: DISCONTINUED | OUTPATIENT
Start: 2022-11-25 | End: 2022-12-02

## 2022-11-25 RX ORDER — FUROSEMIDE 10 MG/ML
80 INJECTION INTRAMUSCULAR; INTRAVENOUS ONCE
Status: COMPLETED | OUTPATIENT
Start: 2022-11-25 | End: 2022-11-25

## 2022-11-25 RX ORDER — ARFORMOTEROL TARTRATE 15 UG/2ML
15 SOLUTION RESPIRATORY (INHALATION)
Status: DISCONTINUED | OUTPATIENT
Start: 2022-11-25 | End: 2022-12-03 | Stop reason: HOSPADM

## 2022-11-25 RX ADMIN — MORPHINE SULFATE 2 MG: 2 INJECTION, SOLUTION INTRAMUSCULAR; INTRAVENOUS at 20:00

## 2022-11-25 RX ADMIN — TIOTROPIUM BROMIDE INHALATION SPRAY 2 PUFF: 3.12 SPRAY, METERED RESPIRATORY (INHALATION) at 10:48

## 2022-11-25 RX ADMIN — LISINOPRIL 40 MG: 40 TABLET ORAL at 20:00

## 2022-11-25 RX ADMIN — ARFORMOTEROL TARTRATE 15 MCG: 15 SOLUTION RESPIRATORY (INHALATION) at 10:47

## 2022-11-25 RX ADMIN — FAMOTIDINE 20 MG: 20 TABLET ORAL at 17:34

## 2022-11-25 RX ADMIN — HEPARIN SODIUM 7800 UNITS: 5000 INJECTION INTRAVENOUS; SUBCUTANEOUS at 13:19

## 2022-11-25 RX ADMIN — GUAIFENESIN 600 MG: 600 TABLET, EXTENDED RELEASE ORAL at 20:00

## 2022-11-25 RX ADMIN — MORPHINE SULFATE 2 MG: 2 INJECTION, SOLUTION INTRAMUSCULAR; INTRAVENOUS at 04:25

## 2022-11-25 RX ADMIN — FUROSEMIDE 80 MG: 10 INJECTION, SOLUTION INTRAMUSCULAR; INTRAVENOUS at 12:08

## 2022-11-25 RX ADMIN — PERFLUTREN 2 ML: 6.52 INJECTION, SUSPENSION INTRAVENOUS at 10:51

## 2022-11-25 RX ADMIN — MORPHINE SULFATE 2 MG: 2 INJECTION, SOLUTION INTRAMUSCULAR; INTRAVENOUS at 15:16

## 2022-11-25 RX ADMIN — ARFORMOTEROL TARTRATE 15 MCG: 15 SOLUTION RESPIRATORY (INHALATION) at 21:25

## 2022-11-25 RX ADMIN — Medication 10 ML: at 08:24

## 2022-11-25 RX ADMIN — HEPARIN SODIUM 20.4 UNITS/KG/HR: 10000 INJECTION, SOLUTION INTRAVENOUS at 21:22

## 2022-11-25 RX ADMIN — POTASSIUM CHLORIDE 40 MEQ: 750 TABLET, EXTENDED RELEASE ORAL at 12:08

## 2022-11-25 RX ADMIN — GUAIFENESIN 600 MG: 600 TABLET, EXTENDED RELEASE ORAL at 12:42

## 2022-11-25 RX ADMIN — HEPARIN SODIUM 16.4 UNITS/KG/HR: 10000 INJECTION, SOLUTION INTRAVENOUS at 05:40

## 2022-11-25 RX ADMIN — MORPHINE SULFATE 2 MG: 2 INJECTION, SOLUTION INTRAMUSCULAR; INTRAVENOUS at 11:16

## 2022-11-25 RX ADMIN — Medication 10 ML: at 20:00

## 2022-11-26 ENCOUNTER — APPOINTMENT (OUTPATIENT)
Dept: GENERAL RADIOLOGY | Facility: HOSPITAL | Age: 62
End: 2022-11-26

## 2022-11-26 ENCOUNTER — APPOINTMENT (OUTPATIENT)
Dept: CT IMAGING | Facility: HOSPITAL | Age: 62
End: 2022-11-26

## 2022-11-26 LAB
ALBUMIN SERPL-MCNC: 3.5 G/DL (ref 3.5–5.2)
ALBUMIN/GLOB SERPL: 1 G/DL
ALP SERPL-CCNC: 109 U/L (ref 39–117)
ALT SERPL W P-5'-P-CCNC: 51 U/L (ref 1–33)
ANION GAP SERPL CALCULATED.3IONS-SCNC: 8.2 MMOL/L (ref 5–15)
APTT PPP: 73 SECONDS (ref 22.7–35.4)
ARTERIAL PATENCY WRIST A: POSITIVE
AST SERPL-CCNC: 37 U/L (ref 1–32)
ATMOSPHERIC PRESS: 751.4 MMHG
BASE EXCESS BLDA CALC-SCNC: -2.4 MMOL/L (ref 0–2)
BASOPHILS # BLD AUTO: 0.03 10*3/MM3 (ref 0–0.2)
BASOPHILS NFR BLD AUTO: 0.3 % (ref 0–1.5)
BDY SITE: ABNORMAL
BILIRUB SERPL-MCNC: 0.6 MG/DL (ref 0–1.2)
BUN SERPL-MCNC: 14 MG/DL (ref 8–23)
BUN/CREAT SERPL: 20.9 (ref 7–25)
CALCIUM SPEC-SCNC: 8.7 MG/DL (ref 8.6–10.5)
CHLORIDE SERPL-SCNC: 105 MMOL/L (ref 98–107)
CHOLEST SERPL-MCNC: 206 MG/DL (ref 0–200)
CO2 SERPL-SCNC: 22.8 MMOL/L (ref 22–29)
CREAT SERPL-MCNC: 0.67 MG/DL (ref 0.57–1)
DEPRECATED RDW RBC AUTO: 40.4 FL (ref 37–54)
EGFRCR SERPLBLD CKD-EPI 2021: 98.4 ML/MIN/1.73
EOSINOPHIL # BLD AUTO: 0.04 10*3/MM3 (ref 0–0.4)
EOSINOPHIL NFR BLD AUTO: 0.3 % (ref 0.3–6.2)
ERYTHROCYTE [DISTWIDTH] IN BLOOD BY AUTOMATED COUNT: 12.1 % (ref 12.3–15.4)
GAS FLOW AIRWAY: 12 LPM
GLOBULIN UR ELPH-MCNC: 3.6 GM/DL
GLUCOSE BLDC GLUCOMTR-MCNC: 126 MG/DL (ref 70–130)
GLUCOSE SERPL-MCNC: 128 MG/DL (ref 65–99)
HCO3 BLDA-SCNC: 21.9 MMOL/L (ref 22–28)
HCT VFR BLD AUTO: 37.1 % (ref 34–46.6)
HDLC SERPL-MCNC: 36 MG/DL (ref 40–60)
HGB BLD-MCNC: 12.5 G/DL (ref 12–15.9)
IMM GRANULOCYTES # BLD AUTO: 0.05 10*3/MM3 (ref 0–0.05)
IMM GRANULOCYTES NFR BLD AUTO: 0.4 % (ref 0–0.5)
LDLC SERPL CALC-MCNC: 144 MG/DL (ref 0–100)
LDLC/HDLC SERPL: 3.92 {RATIO}
LYMPHOCYTES # BLD AUTO: 1.84 10*3/MM3 (ref 0.7–3.1)
LYMPHOCYTES NFR BLD AUTO: 16.1 % (ref 19.6–45.3)
MCH RBC QN AUTO: 30.6 PG (ref 26.6–33)
MCHC RBC AUTO-ENTMCNC: 33.7 G/DL (ref 31.5–35.7)
MCV RBC AUTO: 90.9 FL (ref 79–97)
MODALITY: ABNORMAL
MONOCYTES # BLD AUTO: 1.09 10*3/MM3 (ref 0.1–0.9)
MONOCYTES NFR BLD AUTO: 9.5 % (ref 5–12)
NEUTROPHILS NFR BLD AUTO: 73.4 % (ref 42.7–76)
NEUTROPHILS NFR BLD AUTO: 8.38 10*3/MM3 (ref 1.7–7)
NRBC BLD AUTO-RTO: 0 /100 WBC (ref 0–0.2)
PCO2 BLDA: 35.4 MM HG (ref 35–45)
PH BLDA: 7.4 PH UNITS (ref 7.35–7.45)
PLATELET # BLD AUTO: 218 10*3/MM3 (ref 140–450)
PMV BLD AUTO: 10.8 FL (ref 6–12)
PO2 BLDA: 53 MM HG (ref 80–100)
POTASSIUM SERPL-SCNC: 4.1 MMOL/L (ref 3.5–5.2)
PROT SERPL-MCNC: 7.1 G/DL (ref 6–8.5)
RBC # BLD AUTO: 4.08 10*6/MM3 (ref 3.77–5.28)
SAO2 % BLDCOA: 87.2 % (ref 92–99)
SODIUM SERPL-SCNC: 136 MMOL/L (ref 136–145)
TOTAL RATE: 22 BREATHS/MINUTE
TRIGL SERPL-MCNC: 145 MG/DL (ref 0–150)
VLDLC SERPL-MCNC: 26 MG/DL (ref 5–40)
WBC NRBC COR # BLD: 11.43 10*3/MM3 (ref 3.4–10.8)

## 2022-11-26 PROCEDURE — 71275 CT ANGIOGRAPHY CHEST: CPT

## 2022-11-26 PROCEDURE — 94799 UNLISTED PULMONARY SVC/PX: CPT

## 2022-11-26 PROCEDURE — 82803 BLOOD GASES ANY COMBINATION: CPT

## 2022-11-26 PROCEDURE — 36415 COLL VENOUS BLD VENIPUNCTURE: CPT | Performed by: INTERNAL MEDICINE

## 2022-11-26 PROCEDURE — 80053 COMPREHEN METABOLIC PANEL: CPT | Performed by: INTERNAL MEDICINE

## 2022-11-26 PROCEDURE — 99233 SBSQ HOSP IP/OBS HIGH 50: CPT | Performed by: STUDENT IN AN ORGANIZED HEALTH CARE EDUCATION/TRAINING PROGRAM

## 2022-11-26 PROCEDURE — 94761 N-INVAS EAR/PLS OXIMETRY MLT: CPT

## 2022-11-26 PROCEDURE — 71045 X-RAY EXAM CHEST 1 VIEW: CPT

## 2022-11-26 PROCEDURE — 25010000002 MORPHINE PER 10 MG: Performed by: INTERNAL MEDICINE

## 2022-11-26 PROCEDURE — 25010000002 HEPARIN (PORCINE) 25000-0.45 UT/250ML-% SOLUTION: Performed by: INTERNAL MEDICINE

## 2022-11-26 PROCEDURE — 80061 LIPID PANEL: CPT | Performed by: INTERNAL MEDICINE

## 2022-11-26 PROCEDURE — 36600 WITHDRAWAL OF ARTERIAL BLOOD: CPT

## 2022-11-26 PROCEDURE — 0 IOPAMIDOL PER 1 ML: Performed by: INTERNAL MEDICINE

## 2022-11-26 PROCEDURE — 94760 N-INVAS EAR/PLS OXIMETRY 1: CPT

## 2022-11-26 PROCEDURE — 85730 THROMBOPLASTIN TIME PARTIAL: CPT | Performed by: INTERNAL MEDICINE

## 2022-11-26 PROCEDURE — 82962 GLUCOSE BLOOD TEST: CPT

## 2022-11-26 PROCEDURE — 94664 DEMO&/EVAL PT USE INHALER: CPT

## 2022-11-26 PROCEDURE — 85025 COMPLETE CBC W/AUTO DIFF WBC: CPT | Performed by: INTERNAL MEDICINE

## 2022-11-26 RX ADMIN — ARFORMOTEROL TARTRATE 15 MCG: 15 SOLUTION RESPIRATORY (INHALATION) at 08:14

## 2022-11-26 RX ADMIN — MORPHINE SULFATE 2 MG: 2 INJECTION, SOLUTION INTRAMUSCULAR; INTRAVENOUS at 19:09

## 2022-11-26 RX ADMIN — HEPARIN SODIUM 20.4 UNITS/KG/HR: 10000 INJECTION, SOLUTION INTRAVENOUS at 13:16

## 2022-11-26 RX ADMIN — GUAIFENESIN 600 MG: 600 TABLET, EXTENDED RELEASE ORAL at 21:28

## 2022-11-26 RX ADMIN — IOPAMIDOL 95 ML: 755 INJECTION, SOLUTION INTRAVENOUS at 15:23

## 2022-11-26 RX ADMIN — TIOTROPIUM BROMIDE INHALATION SPRAY 2 PUFF: 3.12 SPRAY, METERED RESPIRATORY (INHALATION) at 08:14

## 2022-11-26 RX ADMIN — IPRATROPIUM BROMIDE AND ALBUTEROL SULFATE 3 ML: 2.5; .5 SOLUTION RESPIRATORY (INHALATION) at 03:41

## 2022-11-26 RX ADMIN — FAMOTIDINE 20 MG: 20 TABLET ORAL at 08:21

## 2022-11-26 RX ADMIN — GUAIFENESIN 600 MG: 600 TABLET, EXTENDED RELEASE ORAL at 08:20

## 2022-11-26 RX ADMIN — ARFORMOTEROL TARTRATE 15 MCG: 15 SOLUTION RESPIRATORY (INHALATION) at 20:55

## 2022-11-26 RX ADMIN — MORPHINE SULFATE 2 MG: 2 INJECTION, SOLUTION INTRAMUSCULAR; INTRAVENOUS at 08:23

## 2022-11-26 RX ADMIN — LISINOPRIL 40 MG: 40 TABLET ORAL at 23:05

## 2022-11-26 RX ADMIN — MORPHINE SULFATE 2 MG: 2 INJECTION, SOLUTION INTRAMUSCULAR; INTRAVENOUS at 04:21

## 2022-11-26 RX ADMIN — MORPHINE SULFATE 2 MG: 2 INJECTION, SOLUTION INTRAMUSCULAR; INTRAVENOUS at 23:05

## 2022-11-26 RX ADMIN — FAMOTIDINE 20 MG: 20 TABLET ORAL at 17:43

## 2022-11-26 RX ADMIN — Medication 10 ML: at 08:21

## 2022-11-26 RX ADMIN — MORPHINE SULFATE 2 MG: 2 INJECTION, SOLUTION INTRAMUSCULAR; INTRAVENOUS at 13:34

## 2022-11-27 LAB
ALBUMIN SERPL-MCNC: 3.6 G/DL (ref 3.5–5.2)
ALBUMIN/GLOB SERPL: 1.2 G/DL
ALP SERPL-CCNC: 109 U/L (ref 39–117)
ALT SERPL W P-5'-P-CCNC: 58 U/L (ref 1–33)
ANION GAP SERPL CALCULATED.3IONS-SCNC: 10 MMOL/L (ref 5–15)
APTT PPP: 129.5 SECONDS (ref 22.7–35.4)
APTT PPP: 58.5 SECONDS (ref 22.7–35.4)
APTT PPP: 61.8 SECONDS (ref 22.7–35.4)
AST SERPL-CCNC: 43 U/L (ref 1–32)
BASOPHILS # BLD AUTO: 0.04 10*3/MM3 (ref 0–0.2)
BASOPHILS NFR BLD AUTO: 0.3 % (ref 0–1.5)
BILIRUB SERPL-MCNC: 0.4 MG/DL (ref 0–1.2)
BUN SERPL-MCNC: 16 MG/DL (ref 8–23)
BUN/CREAT SERPL: 21.9 (ref 7–25)
CALCIUM SPEC-SCNC: 9.1 MG/DL (ref 8.6–10.5)
CHLORIDE SERPL-SCNC: 105 MMOL/L (ref 98–107)
CO2 SERPL-SCNC: 24 MMOL/L (ref 22–29)
CREAT SERPL-MCNC: 0.73 MG/DL (ref 0.57–1)
DEPRECATED RDW RBC AUTO: 41.6 FL (ref 37–54)
EGFRCR SERPLBLD CKD-EPI 2021: 92.5 ML/MIN/1.73
EOSINOPHIL # BLD AUTO: 0.06 10*3/MM3 (ref 0–0.4)
EOSINOPHIL NFR BLD AUTO: 0.5 % (ref 0.3–6.2)
ERYTHROCYTE [DISTWIDTH] IN BLOOD BY AUTOMATED COUNT: 12.2 % (ref 12.3–15.4)
GLOBULIN UR ELPH-MCNC: 3.1 GM/DL
GLUCOSE SERPL-MCNC: 140 MG/DL (ref 65–99)
HCT VFR BLD AUTO: 36.6 % (ref 34–46.6)
HGB BLD-MCNC: 12.4 G/DL (ref 12–15.9)
IMM GRANULOCYTES # BLD AUTO: 0.1 10*3/MM3 (ref 0–0.05)
IMM GRANULOCYTES NFR BLD AUTO: 0.9 % (ref 0–0.5)
LYMPHOCYTES # BLD AUTO: 2.4 10*3/MM3 (ref 0.7–3.1)
LYMPHOCYTES NFR BLD AUTO: 20.8 % (ref 19.6–45.3)
MCH RBC QN AUTO: 31.3 PG (ref 26.6–33)
MCHC RBC AUTO-ENTMCNC: 33.9 G/DL (ref 31.5–35.7)
MCV RBC AUTO: 92.4 FL (ref 79–97)
MONOCYTES # BLD AUTO: 1.2 10*3/MM3 (ref 0.1–0.9)
MONOCYTES NFR BLD AUTO: 10.4 % (ref 5–12)
NEUTROPHILS NFR BLD AUTO: 67.1 % (ref 42.7–76)
NEUTROPHILS NFR BLD AUTO: 7.72 10*3/MM3 (ref 1.7–7)
NRBC BLD AUTO-RTO: 0 /100 WBC (ref 0–0.2)
PLATELET # BLD AUTO: 218 10*3/MM3 (ref 140–450)
PMV BLD AUTO: 10.5 FL (ref 6–12)
POTASSIUM SERPL-SCNC: 3.9 MMOL/L (ref 3.5–5.2)
PROT SERPL-MCNC: 6.7 G/DL (ref 6–8.5)
RBC # BLD AUTO: 3.96 10*6/MM3 (ref 3.77–5.28)
SODIUM SERPL-SCNC: 139 MMOL/L (ref 136–145)
WBC NRBC COR # BLD: 11.52 10*3/MM3 (ref 3.4–10.8)

## 2022-11-27 PROCEDURE — 94761 N-INVAS EAR/PLS OXIMETRY MLT: CPT

## 2022-11-27 PROCEDURE — 25010000002 HEPARIN (PORCINE) 25000-0.45 UT/250ML-% SOLUTION: Performed by: INTERNAL MEDICINE

## 2022-11-27 PROCEDURE — 80053 COMPREHEN METABOLIC PANEL: CPT | Performed by: INTERNAL MEDICINE

## 2022-11-27 PROCEDURE — 25010000002 MORPHINE PER 10 MG: Performed by: INTERNAL MEDICINE

## 2022-11-27 PROCEDURE — 85730 THROMBOPLASTIN TIME PARTIAL: CPT | Performed by: INTERNAL MEDICINE

## 2022-11-27 PROCEDURE — 99233 SBSQ HOSP IP/OBS HIGH 50: CPT | Performed by: STUDENT IN AN ORGANIZED HEALTH CARE EDUCATION/TRAINING PROGRAM

## 2022-11-27 PROCEDURE — 25010000002 HEPARIN (PORCINE) PER 1000 UNITS: Performed by: INTERNAL MEDICINE

## 2022-11-27 PROCEDURE — 94664 DEMO&/EVAL PT USE INHALER: CPT

## 2022-11-27 PROCEDURE — 94799 UNLISTED PULMONARY SVC/PX: CPT

## 2022-11-27 PROCEDURE — 85025 COMPLETE CBC W/AUTO DIFF WBC: CPT | Performed by: INTERNAL MEDICINE

## 2022-11-27 RX ADMIN — HEPARIN SODIUM 20.4 UNITS/KG/HR: 10000 INJECTION, SOLUTION INTRAVENOUS at 04:43

## 2022-11-27 RX ADMIN — FAMOTIDINE 20 MG: 20 TABLET ORAL at 16:39

## 2022-11-27 RX ADMIN — TIOTROPIUM BROMIDE INHALATION SPRAY 2 PUFF: 3.12 SPRAY, METERED RESPIRATORY (INHALATION) at 08:36

## 2022-11-27 RX ADMIN — MORPHINE SULFATE 2 MG: 2 INJECTION, SOLUTION INTRAMUSCULAR; INTRAVENOUS at 21:49

## 2022-11-27 RX ADMIN — MORPHINE SULFATE 2 MG: 2 INJECTION, SOLUTION INTRAMUSCULAR; INTRAVENOUS at 10:00

## 2022-11-27 RX ADMIN — MORPHINE SULFATE 2 MG: 2 INJECTION, SOLUTION INTRAMUSCULAR; INTRAVENOUS at 03:23

## 2022-11-27 RX ADMIN — HEPARIN SODIUM 19.4 UNITS/KG/HR: 10000 INJECTION, SOLUTION INTRAVENOUS at 16:11

## 2022-11-27 RX ADMIN — FAMOTIDINE 20 MG: 20 TABLET ORAL at 06:40

## 2022-11-27 RX ADMIN — LISINOPRIL 40 MG: 40 TABLET ORAL at 20:23

## 2022-11-27 RX ADMIN — Medication 10 ML: at 20:23

## 2022-11-27 RX ADMIN — GUAIFENESIN 600 MG: 600 TABLET, EXTENDED RELEASE ORAL at 10:00

## 2022-11-27 RX ADMIN — HEPARIN SODIUM 3900 UNITS: 5000 INJECTION INTRAVENOUS; SUBCUTANEOUS at 06:00

## 2022-11-27 RX ADMIN — Medication 10 ML: at 10:00

## 2022-11-27 RX ADMIN — HEPARIN SODIUM 7800 UNITS: 5000 INJECTION INTRAVENOUS; SUBCUTANEOUS at 21:41

## 2022-11-27 RX ADMIN — ARFORMOTEROL TARTRATE 15 MCG: 15 SOLUTION RESPIRATORY (INHALATION) at 18:52

## 2022-11-27 RX ADMIN — GUAIFENESIN 600 MG: 600 TABLET, EXTENDED RELEASE ORAL at 20:23

## 2022-11-27 RX ADMIN — ARFORMOTEROL TARTRATE 15 MCG: 15 SOLUTION RESPIRATORY (INHALATION) at 08:35

## 2022-11-27 RX ADMIN — MORPHINE SULFATE 2 MG: 2 INJECTION, SOLUTION INTRAMUSCULAR; INTRAVENOUS at 17:48

## 2022-11-28 LAB
ALBUMIN SERPL-MCNC: 3 G/DL (ref 3.5–5.2)
ALBUMIN/GLOB SERPL: 0.9 G/DL
ALP SERPL-CCNC: 107 U/L (ref 39–117)
ALT SERPL W P-5'-P-CCNC: 60 U/L (ref 1–33)
ANA SER QL: NEGATIVE
ANION GAP SERPL CALCULATED.3IONS-SCNC: 9.8 MMOL/L (ref 5–15)
APTT PPP: 125 SECONDS (ref 22.7–35.4)
APTT PPP: 135.6 SECONDS (ref 22.7–35.4)
APTT PPP: 30.4 SECONDS (ref 22.7–35.4)
APTT PPP: 71.8 SECONDS (ref 22.7–35.4)
AST SERPL-CCNC: 46 U/L (ref 1–32)
BASOPHILS # BLD AUTO: 0.03 10*3/MM3 (ref 0–0.2)
BASOPHILS NFR BLD AUTO: 0.3 % (ref 0–1.5)
BILIRUB SERPL-MCNC: 0.4 MG/DL (ref 0–1.2)
BUN SERPL-MCNC: 16 MG/DL (ref 8–23)
BUN/CREAT SERPL: 25.4 (ref 7–25)
CALCIUM SPEC-SCNC: 8.5 MG/DL (ref 8.6–10.5)
CARDIOLIPIN IGG SER IA-ACNC: <9 GPL U/ML (ref 0–14)
CARDIOLIPIN IGM SER IA-ACNC: 11 MPL U/ML (ref 0–12)
CHLORIDE SERPL-SCNC: 104 MMOL/L (ref 98–107)
CO2 SERPL-SCNC: 23.2 MMOL/L (ref 22–29)
CREAT SERPL-MCNC: 0.63 MG/DL (ref 0.57–1)
DEPRECATED RDW RBC AUTO: 42.8 FL (ref 37–54)
EGFRCR SERPLBLD CKD-EPI 2021: 99.8 ML/MIN/1.73
EOSINOPHIL # BLD AUTO: 0.15 10*3/MM3 (ref 0–0.4)
EOSINOPHIL NFR BLD AUTO: 1.7 % (ref 0.3–6.2)
ERYTHROCYTE [DISTWIDTH] IN BLOOD BY AUTOMATED COUNT: 12.4 % (ref 12.3–15.4)
F5 GENE MUT ANL BLD/T: NORMAL
FACT V ACT/NOR PPP: 128 % (ref 70–150)
FACTOR II, DNA ANALYSIS: NORMAL
GLOBULIN UR ELPH-MCNC: 3.5 GM/DL
GLUCOSE SERPL-MCNC: 135 MG/DL (ref 65–99)
HCT VFR BLD AUTO: 33.2 % (ref 34–46.6)
HGB BLD-MCNC: 11.4 G/DL (ref 12–15.9)
IMM GRANULOCYTES # BLD AUTO: 0.09 10*3/MM3 (ref 0–0.05)
IMM GRANULOCYTES NFR BLD AUTO: 1 % (ref 0–0.5)
LYMPHOCYTES # BLD AUTO: 2.29 10*3/MM3 (ref 0.7–3.1)
LYMPHOCYTES NFR BLD AUTO: 26.2 % (ref 19.6–45.3)
MCH RBC QN AUTO: 32 PG (ref 26.6–33)
MCHC RBC AUTO-ENTMCNC: 34.3 G/DL (ref 31.5–35.7)
MCV RBC AUTO: 93.3 FL (ref 79–97)
MONOCYTES # BLD AUTO: 0.87 10*3/MM3 (ref 0.1–0.9)
MONOCYTES NFR BLD AUTO: 10 % (ref 5–12)
NEUTROPHILS NFR BLD AUTO: 5.31 10*3/MM3 (ref 1.7–7)
NEUTROPHILS NFR BLD AUTO: 60.8 % (ref 42.7–76)
NRBC BLD AUTO-RTO: 0.1 /100 WBC (ref 0–0.2)
PLATELET # BLD AUTO: 194 10*3/MM3 (ref 140–450)
PMV BLD AUTO: 11.2 FL (ref 6–12)
POTASSIUM SERPL-SCNC: 3.9 MMOL/L (ref 3.5–5.2)
PROT SERPL-MCNC: 6.5 G/DL (ref 6–8.5)
RBC # BLD AUTO: 3.56 10*6/MM3 (ref 3.77–5.28)
SODIUM SERPL-SCNC: 137 MMOL/L (ref 136–145)
WBC NRBC COR # BLD: 8.74 10*3/MM3 (ref 3.4–10.8)

## 2022-11-28 PROCEDURE — 94799 UNLISTED PULMONARY SVC/PX: CPT

## 2022-11-28 PROCEDURE — 85730 THROMBOPLASTIN TIME PARTIAL: CPT | Performed by: INTERNAL MEDICINE

## 2022-11-28 PROCEDURE — 94664 DEMO&/EVAL PT USE INHALER: CPT

## 2022-11-28 PROCEDURE — 25010000002 HEPARIN (PORCINE) 25000-0.45 UT/250ML-% SOLUTION: Performed by: INTERNAL MEDICINE

## 2022-11-28 PROCEDURE — 85025 COMPLETE CBC W/AUTO DIFF WBC: CPT | Performed by: INTERNAL MEDICINE

## 2022-11-28 PROCEDURE — 25010000002 MORPHINE PER 10 MG: Performed by: INTERNAL MEDICINE

## 2022-11-28 PROCEDURE — 99232 SBSQ HOSP IP/OBS MODERATE 35: CPT | Performed by: INTERNAL MEDICINE

## 2022-11-28 PROCEDURE — 94761 N-INVAS EAR/PLS OXIMETRY MLT: CPT

## 2022-11-28 PROCEDURE — 80053 COMPREHEN METABOLIC PANEL: CPT | Performed by: INTERNAL MEDICINE

## 2022-11-28 RX ORDER — ECHINACEA PURPUREA EXTRACT 125 MG
2 TABLET ORAL AS NEEDED
Status: DISCONTINUED | OUTPATIENT
Start: 2022-11-28 | End: 2022-12-03 | Stop reason: HOSPADM

## 2022-11-28 RX ADMIN — MORPHINE SULFATE 2 MG: 2 INJECTION, SOLUTION INTRAMUSCULAR; INTRAVENOUS at 23:05

## 2022-11-28 RX ADMIN — FAMOTIDINE 20 MG: 20 TABLET ORAL at 16:56

## 2022-11-28 RX ADMIN — ARFORMOTEROL TARTRATE 15 MCG: 15 SOLUTION RESPIRATORY (INHALATION) at 19:19

## 2022-11-28 RX ADMIN — GUAIFENESIN 600 MG: 600 TABLET, EXTENDED RELEASE ORAL at 20:25

## 2022-11-28 RX ADMIN — HEPARIN SODIUM 23.39 UNITS/KG/HR: 10000 INJECTION, SOLUTION INTRAVENOUS at 05:27

## 2022-11-28 RX ADMIN — SALINE NASAL SPRAY 2 SPRAY: 1.5 SOLUTION NASAL at 16:57

## 2022-11-28 RX ADMIN — LISINOPRIL 40 MG: 40 TABLET ORAL at 20:25

## 2022-11-28 RX ADMIN — FAMOTIDINE 20 MG: 20 TABLET ORAL at 08:30

## 2022-11-28 RX ADMIN — ARFORMOTEROL TARTRATE 15 MCG: 15 SOLUTION RESPIRATORY (INHALATION) at 07:34

## 2022-11-28 RX ADMIN — HEPARIN SODIUM 24.4 UNITS/KG/HR: 10000 INJECTION, SOLUTION INTRAVENOUS at 20:29

## 2022-11-28 RX ADMIN — TIOTROPIUM BROMIDE INHALATION SPRAY 2 PUFF: 3.12 SPRAY, METERED RESPIRATORY (INHALATION) at 07:34

## 2022-11-28 RX ADMIN — MORPHINE SULFATE 2 MG: 2 INJECTION, SOLUTION INTRAMUSCULAR; INTRAVENOUS at 04:27

## 2022-11-28 RX ADMIN — Medication 10 ML: at 08:30

## 2022-11-28 RX ADMIN — Medication 10 ML: at 20:25

## 2022-11-28 RX ADMIN — GUAIFENESIN 600 MG: 600 TABLET, EXTENDED RELEASE ORAL at 08:30

## 2022-11-29 LAB
ALBUMIN SERPL-MCNC: 2.9 G/DL (ref 3.5–5.2)
ALBUMIN/GLOB SERPL: 0.8 G/DL
ALP SERPL-CCNC: 101 U/L (ref 39–117)
ALT SERPL W P-5'-P-CCNC: 58 U/L (ref 1–33)
ANION GAP SERPL CALCULATED.3IONS-SCNC: 9 MMOL/L (ref 5–15)
APTT PPP: 73.1 SECONDS (ref 22.7–35.4)
APTT PPP: 96.8 SECONDS (ref 22.7–35.4)
AST SERPL-CCNC: 28 U/L (ref 1–32)
AT III PPP CHRO-ACNC: 87 % (ref 90–134)
BACTERIA SPEC AEROBE CULT: NORMAL
BACTERIA SPEC AEROBE CULT: NORMAL
BILIRUB SERPL-MCNC: 0.3 MG/DL (ref 0–1.2)
BUN SERPL-MCNC: 10 MG/DL (ref 8–23)
BUN/CREAT SERPL: 18.5 (ref 7–25)
CALCIUM SPEC-SCNC: 8.6 MG/DL (ref 8.6–10.5)
CHLORIDE SERPL-SCNC: 107 MMOL/L (ref 98–107)
CO2 SERPL-SCNC: 23 MMOL/L (ref 22–29)
CREAT SERPL-MCNC: 0.54 MG/DL (ref 0.57–1)
DEPRECATED RDW RBC AUTO: 40.6 FL (ref 37–54)
EGFRCR SERPLBLD CKD-EPI 2021: 103.6 ML/MIN/1.73
ERYTHROCYTE [DISTWIDTH] IN BLOOD BY AUTOMATED COUNT: 12.3 % (ref 12.3–15.4)
GLOBULIN UR ELPH-MCNC: 3.6 GM/DL
GLUCOSE SERPL-MCNC: 128 MG/DL (ref 65–99)
HCT VFR BLD AUTO: 32.3 % (ref 34–46.6)
HGB BLD-MCNC: 10.9 G/DL (ref 12–15.9)
MCH RBC QN AUTO: 30.8 PG (ref 26.6–33)
MCHC RBC AUTO-ENTMCNC: 33.7 G/DL (ref 31.5–35.7)
MCV RBC AUTO: 91.2 FL (ref 79–97)
PLATELET # BLD AUTO: 223 10*3/MM3 (ref 140–450)
PMV BLD AUTO: 10.9 FL (ref 6–12)
POTASSIUM SERPL-SCNC: 3.8 MMOL/L (ref 3.5–5.2)
PROT C ACT/NOR PPP: 116 % (ref 86–163)
PROT SERPL-MCNC: 6.5 G/DL (ref 6–8.5)
RBC # BLD AUTO: 3.54 10*6/MM3 (ref 3.77–5.28)
SODIUM SERPL-SCNC: 139 MMOL/L (ref 136–145)
WBC NRBC COR # BLD: 8.06 10*3/MM3 (ref 3.4–10.8)

## 2022-11-29 PROCEDURE — 94760 N-INVAS EAR/PLS OXIMETRY 1: CPT

## 2022-11-29 PROCEDURE — 94761 N-INVAS EAR/PLS OXIMETRY MLT: CPT

## 2022-11-29 PROCEDURE — 94799 UNLISTED PULMONARY SVC/PX: CPT

## 2022-11-29 PROCEDURE — 25010000002 HEPARIN (PORCINE) 25000-0.45 UT/250ML-% SOLUTION: Performed by: INTERNAL MEDICINE

## 2022-11-29 PROCEDURE — 94664 DEMO&/EVAL PT USE INHALER: CPT

## 2022-11-29 PROCEDURE — 85730 THROMBOPLASTIN TIME PARTIAL: CPT | Performed by: INTERNAL MEDICINE

## 2022-11-29 PROCEDURE — 85027 COMPLETE CBC AUTOMATED: CPT | Performed by: INTERNAL MEDICINE

## 2022-11-29 PROCEDURE — 99232 SBSQ HOSP IP/OBS MODERATE 35: CPT | Performed by: INTERNAL MEDICINE

## 2022-11-29 PROCEDURE — 80053 COMPREHEN METABOLIC PANEL: CPT | Performed by: INTERNAL MEDICINE

## 2022-11-29 PROCEDURE — 25010000002 MORPHINE PER 10 MG: Performed by: INTERNAL MEDICINE

## 2022-11-29 RX ADMIN — GUAIFENESIN 600 MG: 600 TABLET, EXTENDED RELEASE ORAL at 20:14

## 2022-11-29 RX ADMIN — Medication 10 ML: at 08:09

## 2022-11-29 RX ADMIN — ARFORMOTEROL TARTRATE 15 MCG: 15 SOLUTION RESPIRATORY (INHALATION) at 08:37

## 2022-11-29 RX ADMIN — HEPARIN SODIUM 22.4 UNITS/KG/HR: 10000 INJECTION, SOLUTION INTRAVENOUS at 19:12

## 2022-11-29 RX ADMIN — FAMOTIDINE 20 MG: 20 TABLET ORAL at 17:09

## 2022-11-29 RX ADMIN — GUAIFENESIN 600 MG: 600 TABLET, EXTENDED RELEASE ORAL at 08:08

## 2022-11-29 RX ADMIN — MORPHINE SULFATE 2 MG: 2 INJECTION, SOLUTION INTRAMUSCULAR; INTRAVENOUS at 21:54

## 2022-11-29 RX ADMIN — ARFORMOTEROL TARTRATE 15 MCG: 15 SOLUTION RESPIRATORY (INHALATION) at 18:50

## 2022-11-29 RX ADMIN — HEPARIN SODIUM 22.4 UNITS/KG/HR: 10000 INJECTION, SOLUTION INTRAVENOUS at 07:23

## 2022-11-29 RX ADMIN — LISINOPRIL 40 MG: 40 TABLET ORAL at 20:14

## 2022-11-29 RX ADMIN — TIOTROPIUM BROMIDE INHALATION SPRAY 2 PUFF: 3.12 SPRAY, METERED RESPIRATORY (INHALATION) at 08:38

## 2022-11-29 RX ADMIN — Medication 10 ML: at 20:29

## 2022-11-29 RX ADMIN — FAMOTIDINE 20 MG: 20 TABLET ORAL at 08:08

## 2022-11-30 LAB
ALBUMIN SERPL-MCNC: 3 G/DL (ref 3.5–5.2)
ALBUMIN/GLOB SERPL: 1 G/DL
ALP SERPL-CCNC: 94 U/L (ref 39–117)
ALT SERPL W P-5'-P-CCNC: 52 U/L (ref 1–33)
ANION GAP SERPL CALCULATED.3IONS-SCNC: 10.5 MMOL/L (ref 5–15)
APTT PPP: 71.6 SECONDS (ref 22.7–35.4)
APTT PPP: 96.5 SECONDS (ref 22.7–35.4)
APTT SCREEN TO CONFIRM RATIO: 0.94 RATIO (ref 0–1.34)
AST SERPL-CCNC: 28 U/L (ref 1–32)
B2 GLYCOPROT1 IGA SER-ACNC: <9 GPI IGA UNITS (ref 0–25)
B2 GLYCOPROT1 IGG SER-ACNC: <9 GPI IGG UNITS (ref 0–20)
B2 GLYCOPROT1 IGM SER-ACNC: 11 GPI IGM UNITS (ref 0–32)
BILIRUB SERPL-MCNC: 0.2 MG/DL (ref 0–1.2)
BUN SERPL-MCNC: 11 MG/DL (ref 8–23)
BUN/CREAT SERPL: 19.3 (ref 7–25)
CALCIUM SPEC-SCNC: 8.7 MG/DL (ref 8.6–10.5)
CHLORIDE SERPL-SCNC: 103 MMOL/L (ref 98–107)
CO2 SERPL-SCNC: 22.5 MMOL/L (ref 22–29)
CONFIRM APTT/NORMAL: 43.3 SEC (ref 0–47.6)
CREAT SERPL-MCNC: 0.57 MG/DL (ref 0.57–1)
DEPRECATED RDW RBC AUTO: 41.7 FL (ref 37–54)
DRVVT SCREEN TO CONFIRM RATIO: 1.1 RATIO (ref 0.8–1.2)
EGFRCR SERPLBLD CKD-EPI 2021: 102.3 ML/MIN/1.73
ERYTHROCYTE [DISTWIDTH] IN BLOOD BY AUTOMATED COUNT: 12.4 % (ref 12.3–15.4)
GLOBULIN UR ELPH-MCNC: 3.1 GM/DL
GLUCOSE BLDC GLUCOMTR-MCNC: 231 MG/DL (ref 70–130)
GLUCOSE SERPL-MCNC: 188 MG/DL (ref 65–99)
HCT VFR BLD AUTO: 32.6 % (ref 34–46.6)
HGB BLD-MCNC: 10.7 G/DL (ref 12–15.9)
LA 2 SCREEN W REFLEX-IMP: ABNORMAL
MCH RBC QN AUTO: 30.3 PG (ref 26.6–33)
MCHC RBC AUTO-ENTMCNC: 32.8 G/DL (ref 31.5–35.7)
MCV RBC AUTO: 92.4 FL (ref 79–97)
MIXING DRVVT: 41.5 SEC (ref 0–40.4)
PLATELET # BLD AUTO: 217 10*3/MM3 (ref 140–450)
PMV BLD AUTO: 10.4 FL (ref 6–12)
POTASSIUM SERPL-SCNC: 3.4 MMOL/L (ref 3.5–5.2)
PROT SERPL-MCNC: 6.1 G/DL (ref 6–8.5)
PS IGA SER-ACNC: 1 APS UNITS (ref 0–19)
PS IGG SER-ACNC: 9 UNITS (ref 0–30)
PS IGM SER-ACNC: 15 UNITS (ref 0–30)
RBC # BLD AUTO: 3.53 10*6/MM3 (ref 3.77–5.28)
SCREEN APTT: 38.9 SEC (ref 0–51.9)
SCREEN DRVVT: 47.2 SEC (ref 0–47)
SODIUM SERPL-SCNC: 136 MMOL/L (ref 136–145)
THROMBIN TIME: 148.3 SEC (ref 0–23)
TT IMM NP PPP: 85.6 SEC (ref 0–23)
TT P HPASE PPP: 16.2 SEC (ref 0–23)
WBC NRBC COR # BLD: 8.29 10*3/MM3 (ref 3.4–10.8)

## 2022-11-30 PROCEDURE — 80053 COMPREHEN METABOLIC PANEL: CPT | Performed by: INTERNAL MEDICINE

## 2022-11-30 PROCEDURE — 94799 UNLISTED PULMONARY SVC/PX: CPT

## 2022-11-30 PROCEDURE — 94664 DEMO&/EVAL PT USE INHALER: CPT

## 2022-11-30 PROCEDURE — 82962 GLUCOSE BLOOD TEST: CPT

## 2022-11-30 PROCEDURE — 99232 SBSQ HOSP IP/OBS MODERATE 35: CPT | Performed by: INTERNAL MEDICINE

## 2022-11-30 PROCEDURE — 85027 COMPLETE CBC AUTOMATED: CPT | Performed by: INTERNAL MEDICINE

## 2022-11-30 PROCEDURE — 94760 N-INVAS EAR/PLS OXIMETRY 1: CPT

## 2022-11-30 PROCEDURE — 85730 THROMBOPLASTIN TIME PARTIAL: CPT | Performed by: INTERNAL MEDICINE

## 2022-11-30 PROCEDURE — 25010000002 HEPARIN (PORCINE) 25000-0.45 UT/250ML-% SOLUTION: Performed by: INTERNAL MEDICINE

## 2022-11-30 PROCEDURE — 25010000002 MORPHINE PER 10 MG: Performed by: INTERNAL MEDICINE

## 2022-11-30 PROCEDURE — 94761 N-INVAS EAR/PLS OXIMETRY MLT: CPT

## 2022-11-30 RX ORDER — POTASSIUM CHLORIDE 750 MG/1
40 TABLET, FILM COATED, EXTENDED RELEASE ORAL AS NEEDED
Status: DISCONTINUED | OUTPATIENT
Start: 2022-11-30 | End: 2022-12-03 | Stop reason: HOSPADM

## 2022-11-30 RX ORDER — POTASSIUM CHLORIDE 7.45 MG/ML
10 INJECTION INTRAVENOUS
Status: DISCONTINUED | OUTPATIENT
Start: 2022-11-30 | End: 2022-12-03 | Stop reason: HOSPADM

## 2022-11-30 RX ORDER — MAGNESIUM SULFATE HEPTAHYDRATE 40 MG/ML
2 INJECTION, SOLUTION INTRAVENOUS AS NEEDED
Status: DISCONTINUED | OUTPATIENT
Start: 2022-11-30 | End: 2022-12-03 | Stop reason: HOSPADM

## 2022-11-30 RX ORDER — MAGNESIUM SULFATE HEPTAHYDRATE 40 MG/ML
4 INJECTION, SOLUTION INTRAVENOUS AS NEEDED
Status: DISCONTINUED | OUTPATIENT
Start: 2022-11-30 | End: 2022-12-03 | Stop reason: HOSPADM

## 2022-11-30 RX ORDER — POTASSIUM CHLORIDE 1.5 G/1.77G
40 POWDER, FOR SOLUTION ORAL AS NEEDED
Status: DISCONTINUED | OUTPATIENT
Start: 2022-11-30 | End: 2022-12-03 | Stop reason: HOSPADM

## 2022-11-30 RX ADMIN — LISINOPRIL 40 MG: 40 TABLET ORAL at 22:27

## 2022-11-30 RX ADMIN — TIOTROPIUM BROMIDE INHALATION SPRAY 2 PUFF: 3.12 SPRAY, METERED RESPIRATORY (INHALATION) at 08:23

## 2022-11-30 RX ADMIN — MORPHINE SULFATE 2 MG: 2 INJECTION, SOLUTION INTRAMUSCULAR; INTRAVENOUS at 22:27

## 2022-11-30 RX ADMIN — POTASSIUM CHLORIDE 40 MEQ: 750 TABLET, EXTENDED RELEASE ORAL at 06:43

## 2022-11-30 RX ADMIN — GUAIFENESIN 600 MG: 600 TABLET, EXTENDED RELEASE ORAL at 08:19

## 2022-11-30 RX ADMIN — POTASSIUM CHLORIDE 40 MEQ: 750 TABLET, EXTENDED RELEASE ORAL at 11:41

## 2022-11-30 RX ADMIN — ARFORMOTEROL TARTRATE 15 MCG: 15 SOLUTION RESPIRATORY (INHALATION) at 20:04

## 2022-11-30 RX ADMIN — ARFORMOTEROL TARTRATE 15 MCG: 15 SOLUTION RESPIRATORY (INHALATION) at 08:23

## 2022-11-30 RX ADMIN — Medication 10 ML: at 20:19

## 2022-11-30 RX ADMIN — GUAIFENESIN 600 MG: 600 TABLET, EXTENDED RELEASE ORAL at 20:19

## 2022-11-30 RX ADMIN — HEPARIN SODIUM 20.4 UNITS/KG/HR: 10000 INJECTION, SOLUTION INTRAVENOUS at 07:19

## 2022-11-30 RX ADMIN — Medication 10 ML: at 08:20

## 2022-11-30 RX ADMIN — FAMOTIDINE 20 MG: 20 TABLET ORAL at 06:43

## 2022-11-30 RX ADMIN — HEPARIN SODIUM 20.4 UNITS/KG/HR: 10000 INJECTION, SOLUTION INTRAVENOUS at 20:26

## 2022-12-01 LAB
APTT PPP: 65.2 SECONDS (ref 22.7–35.4)
APTT PPP: 65.3 SECONDS (ref 22.7–35.4)

## 2022-12-01 PROCEDURE — 25010000002 HEPARIN (PORCINE) 25000-0.45 UT/250ML-% SOLUTION: Performed by: INTERNAL MEDICINE

## 2022-12-01 PROCEDURE — 94799 UNLISTED PULMONARY SVC/PX: CPT

## 2022-12-01 PROCEDURE — 94664 DEMO&/EVAL PT USE INHALER: CPT

## 2022-12-01 PROCEDURE — 85730 THROMBOPLASTIN TIME PARTIAL: CPT | Performed by: INTERNAL MEDICINE

## 2022-12-01 PROCEDURE — 36415 COLL VENOUS BLD VENIPUNCTURE: CPT | Performed by: INTERNAL MEDICINE

## 2022-12-01 PROCEDURE — 94761 N-INVAS EAR/PLS OXIMETRY MLT: CPT

## 2022-12-01 PROCEDURE — 94760 N-INVAS EAR/PLS OXIMETRY 1: CPT

## 2022-12-01 RX ORDER — HYDROCODONE BITARTRATE AND ACETAMINOPHEN 5; 325 MG/1; MG/1
1 TABLET ORAL EVERY 4 HOURS PRN
Status: DISCONTINUED | OUTPATIENT
Start: 2022-12-01 | End: 2022-12-03 | Stop reason: HOSPADM

## 2022-12-01 RX ADMIN — ARFORMOTEROL TARTRATE 15 MCG: 15 SOLUTION RESPIRATORY (INHALATION) at 06:50

## 2022-12-01 RX ADMIN — LISINOPRIL 40 MG: 40 TABLET ORAL at 20:41

## 2022-12-01 RX ADMIN — GUAIFENESIN 600 MG: 600 TABLET, EXTENDED RELEASE ORAL at 20:41

## 2022-12-01 RX ADMIN — TIOTROPIUM BROMIDE INHALATION SPRAY 2 PUFF: 3.12 SPRAY, METERED RESPIRATORY (INHALATION) at 06:51

## 2022-12-01 RX ADMIN — Medication 10 ML: at 09:54

## 2022-12-01 RX ADMIN — HEPARIN SODIUM 22.4 UNITS/KG/HR: 10000 INJECTION, SOLUTION INTRAVENOUS at 11:09

## 2022-12-01 RX ADMIN — GUAIFENESIN 600 MG: 600 TABLET, EXTENDED RELEASE ORAL at 09:52

## 2022-12-01 RX ADMIN — ARFORMOTEROL TARTRATE 15 MCG: 15 SOLUTION RESPIRATORY (INHALATION) at 21:52

## 2022-12-01 RX ADMIN — APIXABAN 10 MG: 5 TABLET, FILM COATED ORAL at 20:41

## 2022-12-01 RX ADMIN — APIXABAN 10 MG: 5 TABLET, FILM COATED ORAL at 13:09

## 2022-12-01 RX ADMIN — Medication 10 ML: at 20:43

## 2022-12-01 NOTE — PAYOR COMM NOTE
"Fatou Loya (62 y.o. Female)     ATTN: CONTINUED STAY CLINICALS FOR REVIEW: [id number]    PLEASE REPLY TO UR DEPT: [phone],  [phone]    [facility]: NPI [id number]      Date of Birth   1960    Social Security Number       Address   [facility] [address] [place] [address]    Home Phone   [phone]    MRN   [id number]       Taoist   Moravian    Marital Status                               Admission Date   11/24/22    Admission Type   Emergency    Admitting Provider   Jan Andersen MD    Attending Provider   Romaine Shoemaker MD    Department, Room/Bed   [address], [address]/1       Discharge Date       Discharge Disposition       Discharge Destination                               Attending Provider: Romaine Shoemaker MD    Allergies: No Known Allergies    Isolation: None   Infection: None   Code Status: CPR    Ht: 170.2 cm (67\")   Wt: 98.4 kg (216 lb 14.9 oz)    Admission Cmt: None   Principal Problem: Acute pulmonary embolism (HCC) [I26.99]                 Active Insurance as of 11/24/2022     Primary Coverage     Payor Plan Insurance Group Employer/Plan Group    [employer] BLUE CROSS [employer] BLUE CROSS BLUE SHIELD PPO [id number]     Payor Plan Address Payor Plan Phone Number Payor Plan Fax Number Effective Dates    [address] [phone]  1/1/2022 - None Entered    [facility] [id number]       Subscriber Name Subscriber Birth Date Member ID       FATOU LOYA 1960 [id number]                 Emergency Contacts      (Rel.) Home Phone Work Phone Mobile Phone    MARISELA FAJARDO (Friend) -- -- [phone]            Vital Signs (last 2 days)     Date/Time Temp Temp src Pulse Resp BP Patient Position SpO2    12/01/22 1335 98.5 (36.9) Oral 92 18 145/85 Lying 91    12/01/22 0720 98 (36.7) Oral 83 18 151/72 Lying 93    12/01/22 0651 -- -- 89 18 -- -- 100    11/30/22 2242 98.2 (36.8) Oral 97 18 164/84 Lying 91    11/30/22 2004 -- -- " 96 18 -- -- 95    11/30/22 1934 98.7 (37.1) Oral 90 18 173/84 Lying 92    11/30/22 1615 98.5 (36.9) Oral 106 18 163/66 Lying 92    11/30/22 1400 -- -- 84 -- 134/62 -- 91    11/30/22 1300 -- -- 93 -- 140/70 -- 91    11/30/22 1200 -- -- 75 -- 133/69 -- 95    11/30/22 1121 -- -- 89 -- -- -- 91    11/30/22 1100 -- -- 78 -- 123/72 -- 94    11/30/22 1000 -- -- 77 -- 123/67 -- 94    11/30/22 0900 -- -- 80 -- 119/67 -- 94    11/30/22 0823 -- -- 80 16 -- -- 94    11/30/22 0800 -- -- 76 -- 142/69 -- 95    11/30/22 0734 98.3 (36.8) Oral -- -- -- -- --    11/30/22 0600 -- -- 81 -- 119/62 -- 97    11/30/22 0500 -- -- 85 -- 141/70 -- 94    11/30/22 0400 98 (36.7) Oral 81 18 123/83 Lying 93    11/30/22 0300 -- -- 79 -- 109/52 -- 92    11/30/22 0200 -- -- 77 -- 125/60 -- 96    11/30/22 0100 -- -- 74 -- 102/48 -- 93    11/30/22 0000 -- -- 82 -- 109/44 -- 90    11/29/22 2300 -- -- 81 -- 127/58 -- 98    11/29/22 2200 -- -- 80 -- 141/64 -- 95    11/29/22 2100 -- -- 85 -- 151/72 -- 91    11/29/22 2000 -- -- 93 -- -- -- 93    11/29/22 1900 97.4 (36.3) Oral 84 18 159/76 -- 95    11/29/22 1856 -- -- 88 -- -- -- 95    11/29/22 1850 -- -- 80 18 -- -- 94    11/29/22 1800 -- -- 85 -- 132/74 -- 93    11/29/22 1700 -- -- 85 -- 138/61 -- 92    11/29/22 1600 -- -- 81 -- 146/84 -- 93    11/29/22 1400 -- -- 84 -- 133/71 -- 93    11/29/22 1200 -- -- 112 -- 173/121 -- 88    11/29/22 1108 97.8 (36.6) Oral -- -- -- -- --    11/29/22 1100 -- -- 88 -- 116/75 -- 93    11/29/22 1000 -- -- 79 -- 128/62 -- 93    11/29/22 0900 -- -- 90 -- 118/64 -- 88    11/29/22 0838 -- -- 92 20 -- -- 90    11/29/22 0837 -- -- 90 -- -- -- 88    11/29/22 0800 -- -- 74 -- 124/57 -- 90    11/29/22 0718 97.7 (36.5) Oral -- -- -- -- --    11/29/22 0700 -- -- 78 -- 106/71 -- 94    11/29/22 0600 -- -- 84 -- 120/64 -- 90    11/29/22 0500 -- -- 77 -- 110/56 -- 93    11/29/22 0400 -- -- 76 -- 125/91 -- 92    11/29/22 0300 -- -- 79 -- 118/74 -- 92    11/29/22 0227 98.5 (36.9) Oral -- --  -- -- --    11/29/22 0200 -- -- 86 -- 117/60 -- 92    11/29/22 0100 -- -- 81 -- 119/57 -- 92    11/29/22 0000 -- -- 86 -- 119/59 -- 92        Oxygen Therapy (last 2 days)     Date/Time SpO2 Device (Oxygen Therapy) Flow (L/min) Oxygen Concentration (%) ETCO2 (mmHg)    12/01/22 1335 91 nasal cannula 2 -- --    12/01/22 0720 93 nasal cannula 2 -- --    12/01/22 0651 100 nasal cannula 2 -- --    11/30/22 2242 91 nasal cannula -- -- --    11/30/22 2004 95 nasal cannula 2 -- --    11/30/22 1934 92 -- -- -- --    11/30/22 1615 92 nasal cannula 4 -- --    11/30/22 1400 91 -- -- -- --    11/30/22 1300 91 -- -- -- --    11/30/22 1200 95 -- -- -- --    11/30/22 1121 91 nasal cannula 6 -- --    11/30/22 1100 94 -- -- -- --    11/30/22 1000 94 -- -- -- --    11/30/22 0900 94 -- -- -- --    11/30/22 0823 94 high-flow nasal cannula 8 -- --    11/30/22 0800 95 -- -- -- --    11/30/22 0600 97 -- -- -- --    11/30/22 0500 94 -- -- -- --    11/30/22 0400 93 high-flow nasal cannula 15 -- --    11/30/22 0300 92 -- -- -- --    11/30/22 0200 96 -- -- -- --    11/30/22 0100 93 -- -- -- --    11/30/22 0000 90 high-flow nasal cannula 15 -- --    11/29/22 2300 98 -- -- -- --    11/29/22 2200 95 -- -- -- --    11/29/22 2100 91 -- -- -- --    11/29/22 2000 93 high-flow nasal cannula 15 -- --    11/29/22 1900 95 high-flow nasal cannula 15 -- --    11/29/22 1856 95 -- 15 -- --    11/29/22 1850 94 high-flow nasal cannula 15 -- --    11/29/22 1800 93 -- -- -- --    11/29/22 1700 92 -- -- -- --    11/29/22 1600 93 -- -- -- --    11/29/22 1400 93 -- -- -- --    11/29/22 1200 88 -- -- -- --    11/29/22 1100 93 -- -- -- --    11/29/22 1000 93 -- -- -- --    11/29/22 0900 88 -- -- -- --    11/29/22 0838 90 high-flow nasal cannula;humidified -- 15 --    11/29/22 0837 88 -- -- -- --    11/29/22 0800 90 -- -- -- --    11/29/22 0700 94 -- -- -- --    11/29/22 0600 90 -- -- -- --    11/29/22 0500 93 -- -- -- --    11/29/22 0400 92 -- -- -- --    11/29/22  0300 92 -- -- -- --    11/29/22 0200 92 -- -- -- --    11/29/22 0100 92 -- -- -- --    11/29/22 0000 92 high-flow nasal cannula -- 15 --        Intake & Output (last 2 days)       11/29 0701 11/30 0700 11/30 0701  12/01 0700 12/01 0701 12/02 0700    P.O. 300      I.V. (mL/kg)       Total Intake(mL/kg) 300 (3)      Urine (mL/kg/hr) 850 (0.4) 350 (0.1)     Total Output 850 350     Net -550 -350            Urine Unmeasured Occurrence 1 x          Lines, Drains & Airways     Active LDAs     Name Placement date Placement time Site Days    Peripheral IV 11/24/22 1047 Right Antecubital 11/24/22  1047  Antecubital  7    Peripheral IV 11/26/22 1028 Left Hand 11/26/22  1028  Hand  5    External Urinary Catheter 11/27/22  0106  --  4                  Current Facility-Administered Medications   Medication Dose Route Frequency Provider Last Rate Last Admin   • acetaminophen (TYLENOL) tablet 650 mg  650 mg Oral Q4H PRN Jan Andersen MD        Or   • acetaminophen (TYLENOL) 160 MG/5ML solution 650 mg  650 mg Oral Q4H PRN Jan Andersen MD        Or   • acetaminophen (TYLENOL) suppository 650 mg  650 mg Rectal Q4H PRN Jan Andersen MD       • albuterol (PROVENTIL) nebulizer solution 0.083% 2.5 mg/3mL  2.5 mg Nebulization Q6H PRN Dino Crocker Jr., MD       • apixaban (ELIQUIS) tablet 10 mg  10 mg Oral Q12H Romaine Shoemaker MD   10 mg at 12/01/22 1309    Followed by   • [START ON 12/8/2022] apixaban (ELIQUIS) tablet 5 mg  5 mg Oral Q12H Romaine Shoemaker MD       • arformoterol (BROVANA) nebulizer solution 15 mcg  15 mcg Nebulization BID - RT Dino Crocker Jr., MD   15 mcg at 12/01/22 0650   • guaiFENesin (MUCINEX) 12 hr tablet 600 mg  600 mg Oral Q12H Nasim, Samer H, MD   600 mg at 12/01/22 0952   • HYDROcodone-acetaminophen (NORCO) 5-325 MG per tablet 1 tablet  1 tablet Oral Q4H PRN Romaine Shoemaker MD       • ipratropium-albuterol (DUO-NEB) nebulizer solution 3 mL  3 mL Nebulization Q6H PRN Jan Andersen MD    3 mL at 11/26/22 0341   • lisinopril (PRINIVIL,ZESTRIL) tablet 40 mg  40 mg Oral Daily Elizabeth Rouse MD   40 mg at 11/30/22 2227   • Magnesium Sulfate 2 gram Bolus, followed by 8 gram infusion (total Mg dose 10 grams)- Mg less than or equal to 1mg/dL  2 g Intravenous PRN Joshua Lopez MD        Or   • Magnesium Sulfate 2 gram / 50mL Infusion (GIVE X 3 BAGS TO EQUAL 6GM TOTAL DOSE) - Mg 1.1 - 1.5 mg/dl  2 g Intravenous PRN Joshua Lopez MD        Or   • Magnesium Sulfate 4 gram infusion- Mg 1.6-1.9 mg/dL  4 g Intravenous PRN Joshua Lopez MD       • morphine injection 2 mg  2 mg Intravenous Q4H PRN Jan Andersen MD   2 mg at 11/30/22 2227    And   • naloxone (NARCAN) injection 0.4 mg  0.4 mg Intravenous Q5 Min PRN Jan Andersen MD       • nitroglycerin (NITROSTAT) SL tablet 0.4 mg  0.4 mg Sublingual Q5 Min PRN Jan Andersen MD       • ondansetron (ZOFRAN) injection 4 mg  4 mg Intravenous Q6H PRN Jan Andersen MD       • potassium & sodium phosphates (PHOS-NAK) 280-160-250 MG packet - for Phosphorus less than 1.25 mg/dL  2 packet Oral Q6H PRN Joshua Lopez MD        Or   • potassium & sodium phosphates (PHOS-NAK) 280-160-250 MG packet - for Phosphorus 1.25 - 2.5 mg/dL  2 packet Oral Q6H PRN Joshua Lopez MD       • potassium chloride (K-DUR,KLOR-CON) ER tablet 40 mEq  40 mEq Oral PRN Joshua Lopez MD   40 mEq at 11/30/22 1141    Or   • potassium chloride (KLOR-CON) packet 40 mEq  40 mEq Oral PRN Joshua Lopez MD        Or   • potassium chloride 10 mEq in 100 mL IVPB  10 mEq Intravenous Q1H PRN Joshua Lopez MD       • sodium chloride 0.9 % flush 10 mL  10 mL Intravenous PRN Jan Andersen MD       • sodium chloride 0.9 % flush 10 mL  10 mL Intravenous PRN Jan Andersen MD       • sodium chloride 0.9 % flush 10 mL  10 mL Intravenous Q12H Jan Andersen MD   10 mL at 12/01/22 0954   • sodium chloride 0.9 % flush 10 mL  10 mL Intravenous PRN Jan Andersen MD       • sodium chloride 0.9 %  infusion 40 mL  40 mL Intravenous PRN Jan Andersen MD       • sodium chloride nasal spray 2 spray  2 spray Each Nare PRN Stephan Werner MD   2 spray at 11/28/22 1657   • tiotropium (SPIRIVA RESPIMAT) 2.5 mcg/act aerosol solution inhaler  2 puff Inhalation Daily - RT Dino Crocker Jr., MD   2 puff at 12/01/22 0651       Lab Results (last 48 hours)     Procedure Component Value Units Date/Time    aPTT [[id number]]  (Abnormal) Collected: 12/01/22 1117    Specimen: Blood Updated: 12/01/22 1213     PTT 65.2 seconds     aPTT [[id number]]  (Abnormal) Collected: 12/01/22 0356    Specimen: Blood Updated: 12/01/22 0537     PTT 65.3 seconds     POC Glucose Once [[id number]]  (Abnormal) Collected: 11/30/22 2147    Specimen: Blood Updated: 11/30/22 2148     Glucose 231 mg/dL      Comment: Meter: [id number] : mariano Alexander RN       Reflexed DRVVT Confirm [[id number]] Collected: 11/25/22 2024    Specimen: Blood Updated: 11/30/22 1612     Dilute Viper Venom Time 1.1 ratio     Narrative:      Performed at:  [address] [place] [address]  [id number]  : Hanane López MD, Phone:  [phone]    Reflexed DRVVT Mix [[id number]]  (Abnormal) Collected: 11/25/22 2024    Specimen: Blood Updated: 11/30/22 1612     Dilute Viper Venom 1:1 Mix 41.5 sec     Narrative:      Performed at:  [address] [place] [address]  [id number]  : Hanane López MD, Phone:  [phone]    Reflexed TT Mix+TTN [[id number]]  (Abnormal) Collected: 11/25/22 2024    Specimen: Blood Updated: 11/30/22 1612     Thrombin Time Mix 85.6 sec      Thrombin Neutralization 16.2 sec     Narrative:      Performed at:  [address] [place] [address]  [id number]  : Hanane López MD, Phone:  [phone]    Lupus Anticoagulant [[id number]]  (Abnormal) Collected: 11/25/22 2024    Specimen: Blood Updated: 11/30/22 1612     Dilute Prothrombin Time(dPT)  43.3 sec      dPT Confirm Ratio 0.94 Ratio      Thrombin Time 148.3 sec      PTT Lupus Anticoagulant 38.9 sec      Comment: Additional testing confirms the presence of heparin in the test  sample. Results obtained after heparin neutralization.        Dilute Viper Venom Time 47.2 sec      Lupus Anticoagulant Reflex Comment:     Comment: No lupus anticoagulant was detected. These results are consistent with  specific inhibitors to one or more common pathway factors (X, V, II  or fibrinogen). The extended thrombin time corrected on addition of a  heparin neutralizer, consistent with the presence of heparin in the  sample. Heparin acts as a non-specific inhibitor.       Narrative:      Performed at:  [address] [place] [address]  [id number]  : Hanane López MD, Phone:  [phone]    aPTT [[id number]]  (Abnormal) Collected: 11/30/22 1307    Specimen: Blood Updated: 11/30/22 1335     PTT 71.6 seconds     Phosphatidylserine Antibodies [[id number]] Collected: 11/25/22 2025    Specimen: Blood Updated: 11/30/22 1211     Antiphosphatidylserine IgM 15 Units      Antiphosphatidylserine IgA 1 APS Units      Antiphosphatidylserine IgG 9 Units     Narrative:      Performed at:  [address] [place] [address]  [id number]  : Hanane López MD, Phone:  [phone]    aPTT [[id number]]  (Abnormal) Collected: 11/30/22 0432    Specimen: Blood Updated: 11/30/22 0526     PTT 96.5 seconds     Comprehensive Metabolic Panel [[id number]]  (Abnormal) Collected: 11/30/22 0432    Specimen: Blood Updated: 11/30/22 0511     Glucose 188 mg/dL      BUN 11 mg/dL      Creatinine 0.57 mg/dL      Sodium 136 mmol/L      Potassium 3.4 mmol/L      Chloride 103 mmol/L      CO2 22.5 mmol/L      Calcium 8.7 mg/dL      Total Protein 6.1 g/dL      Albumin 3.00 g/dL      ALT (SGPT) 52 U/L      AST (SGOT) 28 U/L      Alkaline Phosphatase 94 U/L      Total Bilirubin 0.2 mg/dL      Globulin  3.1 gm/dL      A/G Ratio 1.0 g/dL      BUN/Creatinine Ratio 19.3     Anion Gap 10.5 mmol/L      eGFR 102.3 mL/min/1.73      Comment: National Kidney Foundation and American Society of Nephrology (ASN) Task Force recommended calculation based on the Chronic Kidney Disease Epidemiology Collaboration (CKD-EPI) equation refit without adjustment for race.       Narrative:      GFR Normal >60  Chronic Kidney Disease <60  Kidney Failure <15      Beta-2 Glycoprotein Antibodies [[id number]] Collected: 11/25/22 1139    Specimen: Blood Updated: 11/30/22 0508     Beta-2 Glyco 1 IgG <9 GPI IgG units      Comment: The reference interval reflects a 3SD or 99th percentile interval,  which is thought to represent a potentially clinically significant  result in accordance with the International Consensus Statement on  the classification criteria for definitive antiphospholipid syndrome  (APS). J Thromb Haem 2006;4:295-306.        Beta-2 Glyco 1 IgA <9 GPI IgA units      Comment: The reference interval reflects a 3SD or 99th percentile interval,  which is thought to represent a potentially clinically significant  result in accordance with the International Consensus Statement on  the classification criteria for definitive antiphospholipid syndrome  (APS). J Thromb Haem 2006;4:295-306.        Beta-2 Glyco 1 IgM 11 GPI IgM units      Comment: The reference interval reflects a 3SD or 99th percentile interval,  which is thought to represent a potentially clinically significant  result in accordance with the International Consensus Statement on  the classification criteria for definitive antiphospholipid syndrome  (APS). J Thromb Haem 2006;4:295-306.       Narrative:      Performed at:  [address] [place] [address]  [id number]  : Hanane López MD, Phone:  [phone]    CBC (No Diff) [[id number]]  (Abnormal) Collected: 11/30/22 0432    Specimen: Blood Updated: 11/30/22 0446     WBC 8.29 10*3/mm3       RBC 3.53 10*6/mm3      Hemoglobin 10.7 g/dL      Hematocrit 32.6 %      MCV 92.4 fL      MCH 30.3 pg      MCHC 32.8 g/dL      RDW 12.4 %      RDW-SD 41.7 fl      MPV 10.4 fL      Platelets 217 10*3/mm3             ECG/EMG Results (last 48 hours)     Procedure Component Value Units Date/Time    SCANNED - TELEMETRY   [[id number]] Resulted: 11/24/22     Updated: 12/01/22 0709          Orders (last 48 hrs)      Start     Ordered    12/08/22 0000  apixaban (ELIQUIS) tablet 5 mg  Every 12 Hours Scheduled        See Hyperspace for full Linked Orders Report.    12/01/22 1257    12/02/22 0600  CBC (No Diff)  Morning Draw         12/01/22 1309    12/02/22 0600  Comprehensive Metabolic Panel  Morning Draw         12/01/22 1309    12/02/22 0600  Magnesium  Morning Draw         12/01/22 1309    12/01/22 1345  apixaban (ELIQUIS) tablet 10 mg  Every 12 Hours Scheduled        See Hyperspace for full Linked Orders Report.    12/01/22 1257    12/01/22 1257  HYDROcodone-acetaminophen (NORCO) 5-325 MG per tablet 1 tablet  Every 4 Hours PRN         12/01/22 1257    12/01/22 1000  aPTT  Once         12/01/22 0722    11/30/22 2149  POC Glucose Once  PROCEDURE ONCE         11/30/22 2147    11/30/22 1612  Reflexed TT Mix+TTN  Once         11/30/22 1611    11/30/22 1612  Reflexed DRVVT Mix  Once         11/30/22 1611    11/30/22 1612  Reflexed DRVVT Confirm  Once         11/30/22 1611    11/30/22 1300  aPTT  Timed         11/30/22 0822    11/30/22 1132  Transfer Patient  Once         11/30/22 1131    11/30/22 0600  CBC (No Diff)  Once         11/30/22 0144    11/30/22 0545  Patient Currently On Electrolyte Replacement Protocol - Please Refer to MAR for Protocol Details  Misc Nursing Order (Specify)  Daily      Comments: Patient Currently On Electrolyte Replacement Protocol - Please Refer to MAR for Protocol Details    11/30/22 0544    11/30/22 0543  potassium chloride (K-DUR,KLOR-CON) ER tablet 40 mEq  As Needed        See Hyperspace for full  Linked Orders Report.    11/30/22 0544    11/30/22 0543  potassium chloride (KLOR-CON) packet 40 mEq  As Needed        See Hyperspace for full Linked Orders Report.    11/30/22 0544    11/30/22 0543  potassium chloride 10 mEq in 100 mL IVPB  Every 1 Hour PRN        See Hyperspace for full Linked Orders Report.    11/30/22 0544    11/30/22 0543  Magnesium Sulfate 2 gram Bolus, followed by 8 gram infusion (total Mg dose 10 grams)- Mg less than or equal to 1mg/dL  As Needed        See Hyperspace for full Linked Orders Report.    11/30/22 0544    11/30/22 0543  Magnesium Sulfate 2 gram / 50mL Infusion (GIVE X 3 BAGS TO EQUAL 6GM TOTAL DOSE) - Mg 1.1 - 1.5 mg/dl  As Needed        See Hyperspace for full Linked Orders Report.    11/30/22 0544 11/30/22 0543  Magnesium Sulfate 4 gram infusion- Mg 1.6-1.9 mg/dL  As Needed        See Hyperspace for full Linked Orders Report.    11/30/22 0544    11/30/22 0543  potassium & sodium phosphates (PHOS-NAK) 280-160-250 MG packet - for Phosphorus less than 1.25 mg/dL  Every 6 Hours PRN        See Hyperspace for full Linked Orders Report.    11/30/22 0544 11/30/22 0543  potassium & sodium phosphates (PHOS-NAK) 280-160-250 MG packet - for Phosphorus 1.25 - 2.5 mg/dL  Every 6 Hours PRN        See Hyperspace for full Linked Orders Report.    11/30/22 0544    11/28/22 1216  sodium chloride nasal spray 2 spray  As Needed         11/28/22 1216    11/26/22 0600  Comprehensive Metabolic Panel  Daily       11/25/22 1146    11/26/22 0600  aPTT  Daily,   Status:  Canceled       11/25/22 2134    11/25/22 2100  lisinopril (PRINIVIL,ZESTRIL) tablet 40 mg  Daily         11/25/22 1146    11/25/22 1730  famotidine (PEPCID) tablet 20 mg  2 Times Daily Before Meals,   Status:  Discontinued         11/25/22 1152    11/25/22 1330  guaiFENesin (MUCINEX) 12 hr tablet 600 mg  Every 12 Hours Scheduled         11/25/22 1146    11/25/22 1000  tiotropium (SPIRIVA RESPIMAT) 2.5 mcg/act aerosol solution  inhaler  Daily - RT         11/25/22 0911    11/25/22 1000  arformoterol (BROVANA) nebulizer solution 15 mcg  2 Times Daily - RT         11/25/22 0911    11/25/22 0916  Incentive Spirometry  Every Hour While Awake       11/25/22 0915    11/25/22 0911  albuterol (PROVENTIL) nebulizer solution 0.083% 2.5 mg/3mL  Every 6 Hours PRN         11/25/22 0911    11/25/22 0550  ipratropium-albuterol (DUO-NEB) nebulizer solution 3 mL  Every 6 Hours PRN         11/25/22 0550    11/25/22 0430  aPTT  Daily,   Status:  Canceled      Comments: Cancel If Patient Has Infusion Changes      11/24/22 1408    11/24/22 2211  morphine injection 2 mg  Every 4 Hours PRN        See Hyperspace for full Linked Orders Report.    11/24/22 2212    11/24/22 2211  naloxone (NARCAN) injection 0.4 mg  Every 5 Minutes PRN        See Hyperspace for full Linked Orders Report.    11/24/22 2212    11/24/22 2100  sodium chloride 0.9 % flush 10 mL  Every 12 Hours Scheduled         11/24/22 1542    11/24/22 1800  Oral Care  2 Times Daily       11/24/22 1542    11/24/22 1600  Vital Signs  Every 4 Hours       11/24/22 1542    11/24/22 1542  acetaminophen (TYLENOL) tablet 650 mg  Every 4 Hours PRN        See Hyperspace for full Linked Orders Report.    11/24/22 1542    11/24/22 1542  acetaminophen (TYLENOL) 160 MG/5ML solution 650 mg  Every 4 Hours PRN        See Hyperspace for full Linked Orders Report.    11/24/22 1542    11/24/22 1542  acetaminophen (TYLENOL) suppository 650 mg  Every 4 Hours PRN        See Hyperspace for full Linked Orders Report.    11/24/22 1542    11/24/22 1542  ondansetron (ZOFRAN) injection 4 mg  Every 6 Hours PRN         11/24/22 1542    11/24/22 1542  Intake & Output  Every Shift       11/24/22 1542    11/24/22 1541  nitroglycerin (NITROSTAT) SL tablet 0.4 mg  Every 5 Minutes PRN         11/24/22 1542    11/24/22 1541  sodium chloride 0.9 % flush 10 mL  As Needed         11/24/22 1542    11/24/22 1541  sodium chloride 0.9 % infusion 40  mL  As Needed         11/24/22 1542    11/24/22 1410  heparin 20864 units/250 mL (100 units/mL) in 0.45 % NaCl infusion  Titrated,   Status:  Discontinued         11/24/22 1408    11/24/22 1407  RN To Release aPTT Order 6 Hours After Heparin Bolus & 6 Hours After Any Heparin Rate Change  As Needed,   Status:  Canceled       11/24/22 1408    11/24/22 1114  sodium chloride 0.9 % flush 10 mL  As Needed        See Hyperspace for full Linked Orders Report.    11/24/22 1114    11/24/22 1046  sodium chloride 0.9 % flush 10 mL  As Needed         11/24/22 1047    Unscheduled  Oxygen Therapy- Nasal Cannula; Titrate for SPO2: 90% - 95%  Continuous PRN      Comments: If Patient Develops Unresponsiveness, Acute Dyspnea, Cyanosis or Suspected Hypoxemia Start Continuous Pulse Ox Monitoring, Apply Oxygen & Notify Provider    11/24/22 1542    Unscheduled  ECG 12 Lead Other; Acute Chest Pain or Dysrhythmia  As Needed      Comments: Nurse to Release if Patient Expericences Acute Chest Pain or Dysrhythmias    11/24/22 1542    Unscheduled  Potassium  As Needed      Comments: For Ventricular Arrhythmias      11/24/22 1542    Unscheduled  Magnesium  As Needed      Comments: For Ventricular Arrhythmias      11/24/22 1542    Unscheduled  Troponin  As Needed      Comments: For Chest Pain      11/24/22 1542    Unscheduled  Blood Gas, Arterial -  As Needed      Comments: Draw for Acute Dyspnea, Cyanosis, Suspected Hypoxemia or UnresponsivenessNotify Provider of Results      11/24/22 1542    Unscheduled  Initiate ACLS Protocol For Life Threatening Dysrhythmias (If Appropriate for Patient)  As Needed       11/24/22 1542    Unscheduled  Initiate & Follow Electrolyte Replacement Protocol  As Needed       11/30/22 0544    Unscheduled  Magnesium  As Needed       11/30/22 0544    Unscheduled  Potassium  As Needed       11/30/22 0544    --  SCANNED - TELEMETRY           11/24/22 0000                     Physician Progress Notes (last 48 hours)       Romaine Shoemaker MD at 22 1257              Name: Mariana Loya ADMIT: 2022   : 1960  PCP: Brigitte Alvarado APRN    MRN: [id number] LOS: 7 days   AGE/SEX: 62 y.o. female  [facility]   Subjective   Chief Complaint   Patient presents with   • Shortness of Breath      Transfer to the unit on  for submassive PE and acute worsening of oxygen requirements up to 15 L.  Cardiology evaluated and she did not require any catheter directed therapy for the left-sided PE.  She was maintained on heparin drip and her oxygen requirements did start to improve.  She was transferred to the floor last night.  I saw her today and she is currently requiring 4 L but saturating well.  She is not reporting any chest pain or palpitations.  She does have shortness of breath mostly with exertion now.  She has some left-sided pleurisy but it is improved.  No productive cough or hemoptysis.  No nausea vomiting diarrhea or abdominal pain.  We discussed oral anticoagulants and she was on Eliquis in the past for a DVT of her left leg.  She completed that course and was subsequently off of Eliquis.  She tolerated it well so we will plan to transition her back to Eliquis today and will require lifelong therapy for recurrent clot.    Objective   Vital Signs  Temp:  [98 °F (36.7 °C)-98.7 °F (37.1 °C)] 98 °F (36.7 °C)  Heart Rate:  [] 83  Resp:  [18] 18  BP: (134-173)/(62-84) 151/72  SpO2:  [91 %-100 %] 93 %  on  Flow (L/min):  [2-4] 2;   Device (Oxygen Therapy): nasal cannula  Body mass index is 33.98 kg/m².    Physical Exam  Vitals and nursing note reviewed.   Constitutional:       General: She is not in acute distress.     Appearance: She is not diaphoretic.   HENT:      Head: Normocephalic and atraumatic.   Eyes:      General:         Right eye: No discharge.         Left eye: No discharge.      Conjunctiva/sclera: Conjunctivae normal.   Cardiovascular:      Rate and Rhythm: Normal rate and regular rhythm.       Pulses: Normal pulses.   Pulmonary:      Effort: Pulmonary effort is normal.      Breath sounds: No wheezing or rhonchi.   Abdominal:      General: There is no distension.      Palpations: Abdomen is soft.      Tenderness: There is no abdominal tenderness. There is no guarding or rebound.   Musculoskeletal:         General: No tenderness.      Cervical back: Neck supple. No tenderness.      Right lower leg: Edema present.      Left lower leg: Edema present.      Comments: trace   Skin:     General: Skin is warm and dry.   Neurological:      Mental Status: She is alert. Mental status is at baseline.   Psychiatric:         Mood and Affect: Mood normal.         Behavior: Behavior normal.         Results Review:       I reviewed the patient's new clinical results.     I reviewed imaging, agree with interpretation.     I reviewed telemetry/EKG results, sinus on telemetry     I reviewed other test results, agree with interpretation.    Results from last 7 days   Lab Units 11/30/22 0432 11/29/22 0543 11/28/22 0409 11/27/22  0330   WBC 10*3/mm3 8.29 8.06 8.74 11.52*   HEMOGLOBIN g/dL 10.7* 10.9* 11.4* 12.4   PLATELETS 10*3/mm3 217 223 194 218     Results from last 7 days   Lab Units 11/30/22  0432 11/29/22  0543 11/28/22  0409 11/27/22  0330   SODIUM mmol/L 136 139 137 139   POTASSIUM mmol/L 3.4* 3.8 3.9 3.9   CHLORIDE mmol/L 103 107 104 105   CO2 mmol/L 22.5 23.0 23.2 24.0   BUN mg/dL 11 10 16 16   CREATININE mg/dL 0.57 0.54* 0.63 0.73   GLUCOSE mg/dL 188* 128* 135* 140*   Estimated Creatinine Clearance: 121.7 mL/min (by C-G formula based on SCr of 0.57 mg/dL).  Results from last 7 days   Lab Units 11/30/22  0432 11/29/22 0543 11/28/22 0409 11/27/22  0330   CALCIUM mg/dL 8.7 8.6 8.5* 9.1   ALBUMIN g/dL 3.00* 2.90* 3.00* 3.60     Results from last 7 days   Lab Units 12/01/22  1117 12/01/22  0356 11/30/22  1307   APTT seconds 65.2* 65.3* 71.6*        apixaban, 10 mg, Oral, Q12H   Followed by  [START ON 12/8/2022]  apixaban, 5 mg, Oral, Q12H  arformoterol, 15 mcg, Nebulization, BID - RT  guaiFENesin, 600 mg, Oral, Q12H  lisinopril, 40 mg, Oral, Daily  sodium chloride, 10 mL, Intravenous, Q12H  tiotropium bromide monohydrate, 2 puff, Inhalation, Daily - RT       Diet: Regular/House Diet; Texture: Regular Texture (IDDSI 7); Fluid Consistency: Thin (IDDSI 0)    Assessment & Plan     Active Hospital Problems    Diagnosis  POA   • **Acute pulmonary embolism (HCC) [I26.99]  Yes   • History of DVT (deep vein thrombosis) [Z86.718]  Not Applicable   • COPD (chronic obstructive pulmonary disease) (HCC) [J44.9]  Yes   • Hyperlipidemia [E78.5]  Yes   • Elevated liver function tests [R79.89]  Yes   • Hyperglycemia [R73.9]  Yes   • Hypertension [I10]  Yes   • Tobacco abuse [Z72.0]  Yes      Resolved Hospital Problems   No resolved problems to display.       · Acute pulmonary embolism/acute RLE DVT/acute hypoxic respiratory failure: Stabilized in the ICU and transferred back to floor.  Will transition to Eliquis as she has tolerated this in the past.  Did not require catheter directed therapy.  Appreciated cardiology and pulmonology evaluations.  I did turn her from 4 L to 3 L during my exam.  Continue to wean O2 as able.  She may require oxygen at discharge  · COPD: No acute bronchospasm on exam today.  Will monitor.  · Hypertension: Above goal currently however blood pressure looked better yesterday.  Plan to monitor with current medications and consider adjustment tomorrow  · Anemia: Roughly stable overnight.  Will monitor.  No overt losses reported  · LFT elevation: Began to improve.  We will repeat in morning to monitor  · Hyperlipidemia: Consider statin if LFTs continue to improve.  · Hyperglycemia: A1c of 6.2.  · Prophylaxis: Anticoagulation as above  · Disposition: Home/TBD    Romaine Shoemaker MD  [facility]  12/01/22  12:58 EST    Dictated portions using Dragon dictation software.    During the entire  "encounter, I was wearing recommended PPE including face mask and eye protection. Hand sanitization was performed prior to entering room and upon exit.    Electronically signed by Romaine Shoemaker MD at 22 1308     Fer Mcbride MD at 22 1034              Patient Name: Mariana Loya  :1960  62 y.o.      Patient Care Team:  Brigitte Alvarado APRN as PCP - General (Nurse Practitioner)    Chief Complaint:   Shortness of breath, chest pain    Interval History:   States the pain has improved.  Hypoxia is improving slowly as well.  Better today.  Objective   Vital Signs  Temp:  [97.4 °F (36.3 °C)-98.3 °F (36.8 °C)] 98.3 °F (36.8 °C)  Heart Rate:  [] 77  Resp:  [16-18] 16  BP: (102-173)/() 123/67    Intake/Output Summary (Last 24 hours) at 2022 1034  Last data filed at 2022 0400  Gross per 24 hour   Intake 300 ml   Output 850 ml   Net -550 ml     Flowsheet Rows    Flowsheet Row First Filed Value   Admission Height 170.2 cm (67\") Documented at 2022 1300   Admission Weight 97.5 kg (215 lb) Documented at 2022 1300          GEN: no distress, alert and oriented  HEENT: NACT, EOMI, moist mucous membranes, high flow nasal cannula in place  Lungs: Decreased breath sounds bases  CV: Normal rate, regular rhythm, normal S1, S2, no murmurs, +2 radial pulses b/l, no carotid bruit  Abdomen: soft, nontender, nondistended, NABS  Extremities: no edema  Skin: no rash, warm, dry  Heme/Lymph: no bruising  Psych: organized thought, normal behavior and affect    Results Review:    Results from last 7 days   Lab Units 22  0432   SODIUM mmol/L 136   POTASSIUM mmol/L 3.4*   CHLORIDE mmol/L 103   CO2 mmol/L 22.5   BUN mg/dL 11   CREATININE mg/dL 0.57   GLUCOSE mg/dL 188*   CALCIUM mg/dL 8.7     Results from last 7 days   Lab Units 22  1048   TROPONIN T ng/mL <0.010     Results from last 7 days   Lab Units 22  0432   WBC 10*3/mm3 8.29   HEMOGLOBIN g/dL 10.7* "   HEMATOCRIT % 32.6*   PLATELETS 10*3/mm3 217     Results from last 7 days   Lab Units 11/30/22  0432 11/29/22  1333 11/29/22  0543 11/24/22  2038 11/24/22  1048   INR   --   --   --   --  0.99   APTT seconds 96.5* 73.1* 96.8*   < > 26.1    < > = values in this interval not displayed.         Results from last 7 days   Lab Units 11/26/22  0654   CHOLESTEROL mg/dL 206*   TRIGLYCERIDES mg/dL 145   HDL CHOL mg/dL 36*   LDL CHOL mg/dL 144*               Medication Review:   arformoterol, 15 mcg, Nebulization, BID - RT  famotidine, 20 mg, Oral, BID AC  guaiFENesin, 600 mg, Oral, Q12H  lisinopril, 40 mg, Oral, Daily  sodium chloride, 10 mL, Intravenous, Q12H  tiotropium bromide monohydrate, 2 puff, Inhalation, Daily - RT         heparin, 15.4 Units/kg/hr, Last Rate: 20.4 Units/kg/hr (11/30/22 0719)        Assessment & Plan   1. Acute PE without acute cor pulmonale  2. Acute approximate respiratory failure  3. Hypertension  4. COPD  5. History of DVT  6.  Atelectasis    -Patient with evidence of pleural effusion, atelectasis, pulmonary emphysema, and stable pulmonary embolus on repeat CTA.  -Hypoxia complicated by pleuritic chest pain and failure to take deep breaths.  Incentive spirometer recommended once again to the patient.  -Continue anticoagulant therapy.      -Hypoxia seems to be improving.  In addition she was able to get up to the bedside commode today without significant issues.    -Assuming that things stay stable I would start a direct oral anticoagulant tomorrow.  No additional cardiac work-up indicated.  I will sign off.    Fer Mcbride MD  [facility]  11/30/22  10:34 EST      Electronically signed by Fer Mcbride MD at 11/30/22 1035     Stephan Werner MD at 11/30/22 0654          [facility]      Mar/chart reviewed  Follow up acute pulmonary embolism, ahrf  Still with shortness of breath and anxiety with any movement, she remains on 15liter high flow --she  feels some improved    Vital Sign Min/Max for last 24 hours  Temp  Min: 97.4 °F (36.3 °C)  Max: 98 °F (36.7 °C)   BP  Min: 102/48  Max: 173/121   Pulse  Min: 74  Max: 112   Resp  Min: 18  Max: 20   SpO2  Min: 88 %  Max: 96 %   Flow (L/min)  Min: 15  Max: 15   No data recorded     Appears ill axox3,   perrl, eomi, normal sclera  mmm, no jvd, trachea midline, neck supple,  chest cta bilaterally, no crackles, no wheezes,   Tachy, regular  soft, nt, nd +bs,  no c/c/ trace edema  Skin warm, dry no rashes    Labs: 11/30: reviewed:  Glucose 188  Bun 11  Cr 0.57  k 3.4  Bicarb 22  Wbc 8  hgb 10.7  plts 217    A/P:  1. Acute PE -- submassive --will ultimately need lifelong anticoagulation, Dr. Crocker ordered a hypercoag work up (looks to be within normal limits). --continue heparin in ICU for now, cards following for any need for more aggressive intervention --she should make sure all recommended cancer screening is up to date.  2. AHRF -- oxygen  3. COPD  4. Obesity  5. DVT  6. Anemia --drop in hgb today? No obvious blood loss or hematoma, monitor. --stable today      Electronically signed by Stephan Werner MD at 11/30/22 2096

## 2022-12-01 NOTE — PROGRESS NOTES
pulm improving, lifelong anticoagulation  Follow up outpatient about 6 weeks  D/c home on trelegy inhaler 100 daily  Will see prn while here.

## 2022-12-01 NOTE — PROGRESS NOTES
Name: Mariana Loya ADMIT: 2022   : 1960  PCP: Brigitte Alvarado APRN    MRN: [id number] LOS: 7 days   AGE/SEX: 62 y.o. female  ROOM: [facility]   Subjective   Chief Complaint   Patient presents with   • Shortness of Breath      Transfer to the unit on  for submassive PE and acute worsening of oxygen requirements up to 15 L.  Cardiology evaluated and she did not require any catheter directed therapy for the left-sided PE.  She was maintained on heparin drip and her oxygen requirements did start to improve.  She was transferred to the floor last night.  I saw her today and she is currently requiring 4 L but saturating well.  She is not reporting any chest pain or palpitations.  She does have shortness of breath mostly with exertion now.  She has some left-sided pleurisy but it is improved.  No productive cough or hemoptysis.  No nausea vomiting diarrhea or abdominal pain.  We discussed oral anticoagulants and she was on Eliquis in the past for a DVT of her left leg.  She completed that course and was subsequently off of Eliquis.  She tolerated it well so we will plan to transition her back to Eliquis today and will require lifelong therapy for recurrent clot.    Objective   Vital Signs  Temp:  [98 °F (36.7 °C)-98.7 °F (37.1 °C)] 98 °F (36.7 °C)  Heart Rate:  [] 83  Resp:  [18] 18  BP: (134-173)/(62-84) 151/72  SpO2:  [91 %-100 %] 93 %  on  Flow (L/min):  [2-4] 2;   Device (Oxygen Therapy): nasal cannula  Body mass index is 33.98 kg/m².    Physical Exam  Vitals and nursing note reviewed.   Constitutional:       General: She is not in acute distress.     Appearance: She is not diaphoretic.   HENT:      Head: Normocephalic and atraumatic.   Eyes:      General:         Right eye: No discharge.         Left eye: No discharge.      Conjunctiva/sclera: Conjunctivae normal.   Cardiovascular:      Rate and Rhythm: Normal rate and regular rhythm.      Pulses: Normal pulses.   Pulmonary:       Effort: Pulmonary effort is normal.      Breath sounds: No wheezing or rhonchi.   Abdominal:      General: There is no distension.      Palpations: Abdomen is soft.      Tenderness: There is no abdominal tenderness. There is no guarding or rebound.   Musculoskeletal:         General: No tenderness.      Cervical back: Neck supple. No tenderness.      Right lower leg: Edema present.      Left lower leg: Edema present.      Comments: trace   Skin:     General: Skin is warm and dry.   Neurological:      Mental Status: She is alert. Mental status is at baseline.   Psychiatric:         Mood and Affect: Mood normal.         Behavior: Behavior normal.         Results Review:       I reviewed the patient's new clinical results.     I reviewed imaging, agree with interpretation.     I reviewed telemetry/EKG results, sinus on telemetry     I reviewed other test results, agree with interpretation.    Results from last 7 days   Lab Units 11/30/22 0432 11/29/22 0543 11/28/22 0409 11/27/22  0330   WBC 10*3/mm3 8.29 8.06 8.74 11.52*   HEMOGLOBIN g/dL 10.7* 10.9* 11.4* 12.4   PLATELETS 10*3/mm3 217 223 194 218     Results from last 7 days   Lab Units 11/30/22  0432 11/29/22  0543 11/28/22  0409 11/27/22  0330   SODIUM mmol/L 136 139 137 139   POTASSIUM mmol/L 3.4* 3.8 3.9 3.9   CHLORIDE mmol/L 103 107 104 105   CO2 mmol/L 22.5 23.0 23.2 24.0   BUN mg/dL 11 10 16 16   CREATININE mg/dL 0.57 0.54* 0.63 0.73   GLUCOSE mg/dL 188* 128* 135* 140*   Estimated Creatinine Clearance: 121.7 mL/min (by C-G formula based on SCr of 0.57 mg/dL).  Results from last 7 days   Lab Units 11/30/22  0432 11/29/22 0543 11/28/22 0409 11/27/22  0330   CALCIUM mg/dL 8.7 8.6 8.5* 9.1   ALBUMIN g/dL 3.00* 2.90* 3.00* 3.60     Results from last 7 days   Lab Units 12/01/22  1117 12/01/22  0356 11/30/22  1307   APTT seconds 65.2* 65.3* 71.6*        apixaban, 10 mg, Oral, Q12H   Followed by  [START ON 12/8/2022] apixaban, 5 mg, Oral, Q12H  arformoterol, 15  mcg, Nebulization, BID - RT  guaiFENesin, 600 mg, Oral, Q12H  lisinopril, 40 mg, Oral, Daily  sodium chloride, 10 mL, Intravenous, Q12H  tiotropium bromide monohydrate, 2 puff, Inhalation, Daily - RT       Diet: Regular/House Diet; Texture: Regular Texture (IDDSI 7); Fluid Consistency: Thin (IDDSI 0)    Assessment & Plan      Active Hospital Problems    Diagnosis  POA   • **Acute pulmonary embolism (HCC) [I26.99]  Yes   • History of DVT (deep vein thrombosis) [Z86.718]  Not Applicable   • COPD (chronic obstructive pulmonary disease) (HCC) [J44.9]  Yes   • Hyperlipidemia [E78.5]  Yes   • Elevated liver function tests [R79.89]  Yes   • Hyperglycemia [R73.9]  Yes   • Hypertension [I10]  Yes   • Tobacco abuse [Z72.0]  Yes      Resolved Hospital Problems   No resolved problems to display.       · Acute pulmonary embolism/acute RLE DVT/acute hypoxic respiratory failure: Stabilized in the ICU and transferred back to floor.  Will transition to Eliquis as she has tolerated this in the past.  Did not require catheter directed therapy.  Appreciated cardiology and pulmonology evaluations.  I did turn her from 4 L to 3 L during my exam.  Continue to wean O2 as able.  She may require oxygen at discharge  · COPD: No acute bronchospasm on exam today.  Will monitor.  · Hypertension: Above goal currently however blood pressure looked better yesterday.  Plan to monitor with current medications and consider adjustment tomorrow  · Anemia: Roughly stable overnight.  Will monitor.  No overt losses reported  · LFT elevation: Began to improve.  We will repeat in morning to monitor  · Hyperlipidemia: Consider statin if LFTs continue to improve.  · Hyperglycemia: A1c of 6.2.  · Prophylaxis: Anticoagulation as above  · Disposition: Home/TBD    Romaine Shoemaker MD  [facility]  12/01/22  12:58 EST    Dictated portions using Dragon dictation software.    During the entire encounter, I was wearing recommended PPE including  face mask and eye protection. Hand sanitization was performed prior to entering room and upon exit.

## 2022-12-01 NOTE — NURSING NOTE
PTT came back subtherapeutic. Called JAVIER Connolly to obtain PRN heparin for bolus per heparin drip protocol. Per Kelsey Huang, we will not give heparin bolus at this time, but will increase rate per heparin drip protocol. See MAR.

## 2022-12-02 LAB
ALBUMIN SERPL-MCNC: 2.7 G/DL (ref 3.5–5.2)
ALBUMIN/GLOB SERPL: 0.8 G/DL
ALP SERPL-CCNC: 89 U/L (ref 39–117)
ALT SERPL W P-5'-P-CCNC: 55 U/L (ref 1–33)
ANION GAP SERPL CALCULATED.3IONS-SCNC: 9.8 MMOL/L (ref 5–15)
AST SERPL-CCNC: 35 U/L (ref 1–32)
BILIRUB SERPL-MCNC: 0.2 MG/DL (ref 0–1.2)
BUN SERPL-MCNC: 10 MG/DL (ref 8–23)
BUN/CREAT SERPL: 15.6 (ref 7–25)
CALCIUM SPEC-SCNC: 8.7 MG/DL (ref 8.6–10.5)
CHLORIDE SERPL-SCNC: 104 MMOL/L (ref 98–107)
CO2 SERPL-SCNC: 23.2 MMOL/L (ref 22–29)
CREAT SERPL-MCNC: 0.64 MG/DL (ref 0.57–1)
DEPRECATED RDW RBC AUTO: 44 FL (ref 37–54)
EGFRCR SERPLBLD CKD-EPI 2021: 99.4 ML/MIN/1.73
ERYTHROCYTE [DISTWIDTH] IN BLOOD BY AUTOMATED COUNT: 12.7 % (ref 12.3–15.4)
GLOBULIN UR ELPH-MCNC: 3.6 GM/DL
GLUCOSE SERPL-MCNC: 137 MG/DL (ref 65–99)
HCT VFR BLD AUTO: 33.2 % (ref 34–46.6)
HGB BLD-MCNC: 11.2 G/DL (ref 12–15.9)
MAGNESIUM SERPL-MCNC: 2 MG/DL (ref 1.6–2.4)
MCH RBC QN AUTO: 31.5 PG (ref 26.6–33)
MCHC RBC AUTO-ENTMCNC: 33.7 G/DL (ref 31.5–35.7)
MCV RBC AUTO: 93.3 FL (ref 79–97)
PLATELET # BLD AUTO: 231 10*3/MM3 (ref 140–450)
PMV BLD AUTO: 10.3 FL (ref 6–12)
POTASSIUM SERPL-SCNC: 4.3 MMOL/L (ref 3.5–5.2)
PROT SERPL-MCNC: 6.3 G/DL (ref 6–8.5)
RBC # BLD AUTO: 3.56 10*6/MM3 (ref 3.77–5.28)
SODIUM SERPL-SCNC: 137 MMOL/L (ref 136–145)
WBC NRBC COR # BLD: 8.54 10*3/MM3 (ref 3.4–10.8)

## 2022-12-02 PROCEDURE — 94760 N-INVAS EAR/PLS OXIMETRY 1: CPT

## 2022-12-02 PROCEDURE — 94799 UNLISTED PULMONARY SVC/PX: CPT

## 2022-12-02 PROCEDURE — 97530 THERAPEUTIC ACTIVITIES: CPT

## 2022-12-02 PROCEDURE — 80053 COMPREHEN METABOLIC PANEL: CPT | Performed by: INTERNAL MEDICINE

## 2022-12-02 PROCEDURE — 97161 PT EVAL LOW COMPLEX 20 MIN: CPT

## 2022-12-02 PROCEDURE — 94761 N-INVAS EAR/PLS OXIMETRY MLT: CPT

## 2022-12-02 PROCEDURE — 85027 COMPLETE CBC AUTOMATED: CPT | Performed by: INTERNAL MEDICINE

## 2022-12-02 PROCEDURE — 83735 ASSAY OF MAGNESIUM: CPT | Performed by: INTERNAL MEDICINE

## 2022-12-02 RX ADMIN — TIOTROPIUM BROMIDE INHALATION SPRAY 2 PUFF: 3.12 SPRAY, METERED RESPIRATORY (INHALATION) at 08:37

## 2022-12-02 RX ADMIN — LISINOPRIL 40 MG: 40 TABLET ORAL at 20:50

## 2022-12-02 RX ADMIN — Medication 10 ML: at 11:07

## 2022-12-02 RX ADMIN — APIXABAN 10 MG: 5 TABLET, FILM COATED ORAL at 20:50

## 2022-12-02 RX ADMIN — GUAIFENESIN 600 MG: 600 TABLET, EXTENDED RELEASE ORAL at 10:04

## 2022-12-02 RX ADMIN — ARFORMOTEROL TARTRATE 15 MCG: 15 SOLUTION RESPIRATORY (INHALATION) at 08:38

## 2022-12-02 RX ADMIN — APIXABAN 10 MG: 5 TABLET, FILM COATED ORAL at 10:04

## 2022-12-02 RX ADMIN — Medication 10 ML: at 20:50

## 2022-12-02 RX ADMIN — ARFORMOTEROL TARTRATE 15 MCG: 15 SOLUTION RESPIRATORY (INHALATION) at 19:14

## 2022-12-02 RX ADMIN — GUAIFENESIN 600 MG: 600 TABLET, EXTENDED RELEASE ORAL at 20:50

## 2022-12-02 NOTE — DISCHARGE PLACEMENT REQUEST
"Fatou Loya (62 y.o. Female)     Date of Birth   1960    Social Security Number       Address   [address] [place] [address]    Home Phone   [phone]    MRN   [id number]       [employer]    Marital Status                               Admission Date   11/24/22    Admission Type   Emergency    Admitting Provider   Jan Andersen MD    Attending Provider   Grzegorz Palafox MD    Department, Room/Bed   [address], [address]1       Discharge Date       Discharge Disposition       Discharge Destination                               Attending Provider: Grzegorz Palafox MD    Allergies: No Known Allergies    Isolation: None   Infection: None   Code Status: CPR    Ht: 170.2 cm (67\")   Wt: 98.4 kg (216 lb 14.9 oz)    Admission Cmt: None   Principal Problem: Acute pulmonary embolism (HCC) [I26.99]                 Active Insurance as of 11/24/2022     Primary Coverage     Payor Plan Insurance Group Employer/Plan Group    [employer]UE [employer] BLUE [employer] BLUE [employer] PPO [id number]     Payor Plan Address Payor Plan Phone Number Payor Plan Fax Number Effective Dates    [address] [phone]  1/1/2022 - None Entered    [facility] [id number]       Subscriber Name Subscriber Birth Date Member ID       FATOU LOYA 1960 [id number]                 Emergency Contacts      (Rel.) Home Phone Work Phone Mobile Phone    MARISELA FAJARDO (Friend) -- -- [phone]              "

## 2022-12-02 NOTE — CASE MANAGEMENT/SOCIAL WORK
Continued Stay Note  [facility]     Patient Name: Mariana Loya  MRN: [id number]  Today's Date: 12/2/2022    Admit Date: 11/24/2022    Plan: Home via friend   Discharge Plan     Row Name 12/02/22 1240       Plan    Plan Home via friend    Patient/Family in Agreement with Plan yes    Plan Comments CCP met with pt at bedside to discuss DC plan. Pt confirms DC plan remains home, friend to transport. CCP discussed home O2 if needed and pt denies preference of companies. CCP explained referral to [facility] would be placed. Pt is agreeable to [facility] if needed. [facility] CASAS/CCP               Discharge Codes    No documentation.               Expected Discharge Date and Time     Expected Discharge Date Expected Discharge Time    Dec 1, 2022             Kaylee Benavidez RN

## 2022-12-02 NOTE — PLAN OF CARE
Goal Outcome Evaluation:  Plan of Care Reviewed With: patient           Outcome Evaluation: Pt. is currently independent with functional mobility and has no further questions/concerns regarding functional mobility or home safety.  Encouraged pt. to continue ambulation with nursing staff while here in the hospital.  Otherwise, P.T. will sign off.    Patient was wearing a face mask during this therapy encounter. Therapist used appropriate personal protective equipment including eye protection, mask, and gloves.  Mask used was standard procedure mask. Appropriate PPE was worn during the entire therapy session. Hand hygiene was completed before and after therapy session. Patient is not in enhanced droplet precautions.

## 2022-12-02 NOTE — PLAN OF CARE
Goal Outcome Evaluation:      pT aoX3, vss, STArted on eliquis today, possible dc tomorrow.

## 2022-12-02 NOTE — THERAPY DISCHARGE NOTE
Patient Name: Mariana Loya  : 1960    MRN: [id number]                              Today's Date: 2022       Admit Date: 2022    Visit Dx:     ICD-10-CM ICD-9-CM   1. Pneumonia of both lungs due to infectious organism, unspecified part of lung  J18.9 483.8   2. Other acute pulmonary embolism with acute cor pulmonale (HCC)  I26.09 415.19     415.0     Patient Active Problem List   Diagnosis   • Hyperlipidemia   • Tobacco abuse   • Incisional hernia, without obstruction or gangrene   • MRSA exposure   • Superficial thrombophlebitis   • Declined smoking cessation   • History of colon polyps   • Pneumonia of both lungs due to infectious organism, unspecified part of lung   • Acute pulmonary embolism (HCC)   • Hypertension   • History of DVT (deep vein thrombosis)   • COPD (chronic obstructive pulmonary disease) (HCC)   • Hyperlipidemia   • Elevated liver function tests   • Hyperglycemia     Past Medical History:   Diagnosis Date   • Colon polyps 10/07/2013    Ascending colon biopsy: fragments of tubular adenoma with low grade dysplasia   • Diverticulitis of colon    • Diverticulosis 10/07/2013    Found in Colonoscopy done by Dr. Eddi Anne   • DVT (deep venous thrombosis) (HCC)    • Hyperlipidemia    • Hypertension Lipitor 30 mg daily   • Superficial thrombosis of leg, left      Past Surgical History:   Procedure Laterality Date   • CHOLECYSTECTOMY     • COLONOSCOPY      Dr. Anne   • COLONOSCOPY N/A 04/15/2019    Procedure: COLONOSCOPY into cecum and TI with hot snare polypectomy with 10  cc saline lift;  Surgeon: Eddi Anne MD;  Location: [facility] ENDOSCOPY;  Service: Gastroenterology   • COLONOSCOPY W/ POLYPECTOMY N/A 10/07/2013    One 10 mm polyp in the proximal ascending colon: resected and retrieved, diverticulosis in the sigmoid colon and in the descending colon-Dr. Eddi Anne    • EYE SURGERY  Renal tear laser surgery   • HERNIA REPAIR     • KNEE ARTHROSCOPY W/ MENISCECTOMY Left  11/17/2006    Left knee arthroscopy with complete lateral meniscectomy-Dr. Ming Thorpe   • LAPAROSCOPIC CHOLECYSTECTOMY W/ CHOLANGIOGRAPHY N/A 11/14/2001    Dr. Lucy Levi   • TONSILLECTOMY AND ADENOIDECTOMY N/A    • VENTRAL/INCISIONAL HERNIA REPAIR N/A 09/08/2018    Procedure: incarcerated /INCISIONAL HERNIA REPAIR with mesh;  Surgeon: Kody Diaz MD;  Location: [facility] OR;  Service: General   • WISDOM TOOTH EXTRACTION N/A 1980    Dr. Wong      General Information     Row Name 12/02/22 1131          Physical Therapy Time and Intention    Document Type discharge evaluation/summary  -MS     Mode of Treatment physical therapy;individual therapy  -MS     Row Name 12/02/22 1131          General Information    Patient Profile Reviewed yes  -MS     Prior Level of Function independent:  -MS     Barriers to Rehab none identified  -MS     Row Name 12/02/22 1131          Cognition    Orientation Status (Cognition) oriented x 3  -MS     Row Name 12/02/22 1131          Safety Issues, Functional Mobility    Comment, Safety Issues/Impairments (Mobility) Gait belt used for safety.  -MS           User Key  (r) = Recorded By, (t) = Taken By, (c) = Cosigned By    Initials Name Provider Type    Rajan Ortega, PT Physical Therapist               Mobility     Row Name 12/02/22 1132          Bed Mobility    Bed Mobility supine-sit;sit-supine  -MS     Supine-Sit [place] (Bed Mobility) independent  -MS     Sit-Supine [place] (Bed Mobility) independent  -MS     Row Name 12/02/22 1132          Sit-Stand Transfer    Sit-Stand [place] (Transfers) independent  -MS     Row Name 12/02/22 1132          Gait/Stairs (Locomotion)    [place] Level (Gait) independent  -MS     Distance in Feet (Gait) 80 feet  -MS     Comment, (Gait/Stairs) No balance deficits noted.  -MS           User Key  (r) = Recorded By, (t) = Taken By, (c) = Cosigned By    Initials Name Provider Type    Rajan Ortega, PT  Physical Therapist               Obj/Interventions     Row Name 12/02/22 1132          Range of Motion Comprehensive    Comment, General Range of Motion BUE/LE (WFL's)  -MS     Row Name 12/02/22 1132          Strength Comprehensive (MMT)    Comment, General Manual Muscle Testing (MMT) Assessment BUE/LE (4-/5)  -MS           User Key  (r) = Recorded By, (t) = Taken By, (c) = Cosigned By    Initials Name Provider Type    Rajan Ortega, PT Physical Therapist               Goals/Plan    No documentation.                Clinical Impression     Row Name 12/02/22 1132          Pain    Pretreatment Pain Rating 0/10 - no pain  -MS     Posttreatment Pain Rating 0/10 - no pain  -MS     Row Name 12/02/22 1132          Plan of Care Review    Plan of Care Reviewed With patient  -MS     Row Name 12/02/22 1132          Vital Signs    Pre SpO2 (%) 92  -MS     O2 Delivery Pre Treatment room air  -MS     Intra SpO2 (%) 87  -MS     O2 Delivery Intra Treatment room air  -MS     Post SpO2 (%) 91  -MS     O2 Delivery Post Treatment room air  -MS     Row Name 12/02/22 1132          Positioning and Restraints    Pre-Treatment Position in bed  -MS     Post Treatment Position bed  -MS     In Bed notified nsg;supine;call light within reach;encouraged to call for assist;exit alarm on  -MS           User Key  (r) = Recorded By, (t) = Taken By, (c) = Cosigned By    Initials Name Provider Type    Rajan Ortega, PT Physical Therapist               Outcome Measures     Row Name 12/02/22 1133 12/02/22 1008       How much help from another person do you currently need...    Turning from your back to your side while in flat bed without using bedrails? 4  -MS 4  -AH    Moving from lying on back to sitting on the side of a flat bed without bedrails? 4  -MS 4  -AH    Moving to and from a bed to a chair (including a wheelchair)? 4  -MS 4  -AH    Standing up from a chair using your arms (e.g., wheelchair, bedside chair)? 4  -MS 4  -AH     Climbing 3-5 steps with a railing? 4  -MS 3  -AH    To walk in hospital room? 4  -MS 4  -AH    AM-PAC 6 Clicks Score (PT) 24  -MS 23  -AH    Highest level of mobility 8 --> Walked 250 feet or more  -MS 7 --> Walked 25 feet or more  -AH    Row Name 12/02/22 1133          Functional Assessment    Outcome Measure Options AM-PAC 6 Clicks Basic Mobility (PT)  -MS           User Key  (r) = Recorded By, (t) = Taken By, (c) = Cosigned By    Initials Name Provider Type    Rajan Ortega, PT Physical Therapist    Ayana Campbell RN Registered Nurse              Physical Therapy Education     Title: PT OT SLP Therapies (Resolved)     Topic: Physical Therapy (Resolved)     Point: Mobility training (Resolved)     Learning Progress Summary           Patient Acceptance, E,D, DU,VU by MS at 12/2/2022 1133                   Point: Home exercise program (Resolved)     Learning Progress Summary           Patient Acceptance, E,D, DU,VU by MS at 12/2/2022 1133                   Point: Body mechanics (Resolved)     Learning Progress Summary           Patient Acceptance, E,D, DU,VU by MS at 12/2/2022 1133                   Point: Precautions (Resolved)     Learning Progress Summary           Patient Acceptance, E,D, DU,VU by MS at 12/2/2022 1133                               User Key     Initials Effective Dates Name Provider Type Discipline    MS 06/16/21 -  Rajan Palafox, PT Physical Therapist PT              PT Recommendation and Plan     Plan of Care Reviewed With: patient  Outcome Evaluation: Pt. is currently independent with functional mobility and has no further questions/concerns regarding functional mobility or home safety.  Encouraged pt. to continue ambulation with nursing staff while here in the hospital.  Otherwise, P.T. will sign off.     Time Calculation:    PT Charges     Row Name 12/02/22 1134             Time Calculation    Start Time 0910  -MS      Stop Time 0925  -MS      Time Calculation (min) 15 min   -MS      PT Received On 12/02/22  -MS         Time Calculation- PT    Total Timed Code Minutes- PT 14 minute(s)  -MS            User Key  (r) = Recorded By, (t) = Taken By, (c) = Cosigned By    Initials Name Provider Type    Rajan Ortega, PT Physical Therapist              Therapy Charges for Today     Code Description Service Date Service Provider Modifiers Qty    32605110778 HC PT EVAL LOW COMPLEXITY 1 12/2/2022 Rajan Palafox, PT GP 1    [id number]  PT THERAPEUTIC ACT EA 15 MIN 12/2/2022 Rajan Palafox, PT GP 1          PT G-Codes  Outcome Measure Options: AM-PAC 6 Clicks Basic Mobility (PT)  AM-PAC 6 Clicks Score (PT): 24    PT Discharge Summary  Anticipated Discharge Disposition (PT): home with assist  Reason for Discharge: At baseline function, Independent  Discharge Destination: Home with assist    Rajan Palafox, PT  12/2/2022

## 2022-12-02 NOTE — PROGRESS NOTES
Name: Mariana Loya ADMIT: 2022   : 1960  PCP: Brigitte Alvarado APRN    MRN: [id number] LOS: 8 days   AGE/SEX: 62 y.o. female  ROOM: [facility] [[email or web address]     Subjective   Subjective   No events overnight. She is anxious about her continued oxygen requirement, but overall feels better      Objective   Objective   Vital Signs  Temp:  [97.9 °F (36.6 °C)-98.4 °F (36.9 °C)] 97.9 °F (36.6 °C)  Heart Rate:  [84-98] 84  Resp:  [18-20] 18  BP: (132-179)/(70-97) 146/91  SpO2:  [86 %-92 %] 91 %  on  Flow (L/min):  [1-3.5] 2;   Device (Oxygen Therapy): nasal cannula  Body mass index is 33.98 kg/m².  Physical Exam  Constitutional:       General: She is not in acute distress.     Appearance: She is not toxic-appearing.   Cardiovascular:      Rate and Rhythm: Normal rate and regular rhythm.      Heart sounds: Normal heart sounds.   Pulmonary:      Effort: Pulmonary effort is normal. No respiratory distress.      Breath sounds: Normal breath sounds. No wheezing, rhonchi or rales.   Abdominal:      General: Bowel sounds are normal. There is no distension.      Palpations: Abdomen is soft.      Tenderness: There is no abdominal tenderness.   Musculoskeletal:      Right lower leg: No edema.      Left lower leg: No edema.   Neurological:      Mental Status: She is alert.   Psychiatric:         Mood and Affect: Mood normal.         Behavior: Behavior normal.         Results Review     I reviewed the patient's new clinical results.  Results from last 7 days   Lab Units 22  0409   WBC 10*3/mm3 8.54 8.29 8.06 8.74   HEMOGLOBIN g/dL 11.2* 10.7* 10.9* 11.4*   PLATELETS 10*3/mm3 231 217 223 194     Results from last 7 days   Lab Units 222 2243 22  0409   SODIUM mmol/L 137 136 139 137   POTASSIUM mmol/L 4.3 3.4* 3.8 3.9   CHLORIDE mmol/L 104 103 107 104   CO2 mmol/L 23.2 22.5 23.0 23.2   BUN mg/dL 10 11 10 16   CREATININE mg/dL 0.64 0.57 0.54*  0.63   GLUCOSE mg/dL 137* 188* 128* 135*   Estimated Creatinine Clearance: 108.4 mL/min (by C-G formula based on SCr of 0.64 mg/dL).  Results from last 7 days   Lab Units 12/02/22 0338 11/30/22 0432 11/29/22 0543 11/28/22  0409   ALBUMIN g/dL 2.70* 3.00* 2.90* 3.00*   BILIRUBIN mg/dL 0.2 0.2 0.3 0.4   ALK PHOS U/L 89 94 101 107   AST (SGOT) U/L 35* 28 28 46*   ALT (SGPT) U/L 55* 52* 58* 60*     Results from last 7 days   Lab Units 12/02/22 0338 11/30/22 0432 11/29/22 0543 11/28/22  0409   CALCIUM mg/dL 8.7 8.7 8.6 8.5*   ALBUMIN g/dL 2.70* 3.00* 2.90* 3.00*   MAGNESIUM mg/dL 2.0  --   --   --        COVID19   Date Value Ref Range Status   11/24/2022 Not Detected Not Detected - Ref. Range Final     Glucose   Date/Time Value Ref Range Status   11/30/2022 2147 231 (H) 70 - 130 mg/dL Final     Comment:     Meter: [id number] : mariano Alexander RN       CT Angiogram Chest  Narrative: CT ANGIOGRAM OF THE CHEST. MULTIPLE CORONAL, SAGITTAL, AND 3-D  RECONSTRUCTIONS.     HISTORY: Shortness of air.     TECHNIQUE: Radiation dose reduction techniques were utilized, including  automated exposure control and exposure modulation based on body size.   CT angiogram of the chest was performed following the administration of  IV contrast. Coronal, sagittal, and 3-D reconstruction images were  obtained.     COMPARISON:  CT angiogram chest 11/24/2022, CT abdomen and pelvis  09/08/2018     FINDINGS: There remains a pulmonary embolus within the left main  pulmonary artery that begins at its origin and is contiguous with  occlusive embolus extending to the anterior segment left upper lobe and  nonocclusive embolus extending to the distal left main pulmonary artery.  Left lower lobe pulmonary emboli extend into anteromedial, lateral  basal, and posterior basal segments with similar pattern to the previous  exam.     There is no embolus within the right main pulmonary artery and there is  no definite embolus within the  right middle lobe segmental artery. There  is an embolus within the posterior basal segment of the right lower lobe  without change.     When compared to the exam 2 days ago, there is developed increased  bilateral lower lobe lobe consolidation and this is greater on the right  and air bronchograms are present. There is also developed increased  peripheral inferior left upper lobe wedge-shaped opacity that measures  2.5 cm in height at its peripheral margin x 4 cm transverse x 8 cm AP  and is consistent with developing pulmonary infarction. There is diffuse  pulmonary emphysema. No central endotracheal lesion is demonstrated.  There is a small left pleural effusion     Imaging through the upper abdomen demonstrates cholecystectomy clips.  There is a midline upper abdomen ventral hernia contains fat.  There are  mildly enlarged mediastinal lymph nodes. A right pretracheal node  measures 13 mm. Subcarinal node measures 18 mm short axis.     Impression: 1. Pulmonary thromboembolic disease without evidence for significant  change in extent of emboli when compared to the exam 2 days ago. The  largest embolus begins at the origin of the left main pulmonary artery  and emboli are more extensive on the left as compared to the right.  2. Wedge-shaped peripheral opacity within the inferolateral left upper  lobe consistent with pulmonary infarction and this measures  approximately 8 x 4 x 2.5 cm.  3. Increased bilateral lower lobe consolidation with small left pleural  effusion.  4. Diffuse pulmonary emphysema. Mild mediastinal madhuri enlargement  without change.     This report was finalized on 11/26/2022 4:58 PM by Dr. Will Li M.D.     XR Chest 1 View  Narrative: SINGLE VIEW OF THE CHEST     HISTORY: Increasing oxygen requirement     COMPARISON: 11/20/2022     FINDINGS:  Cardiomegaly is present. Bibasilar consolidation is again noted.  Bilateral pulmonary opacities are again noted, suspicious for areas  pulmonary  infarction. Small bilateral effusions are suspected. No  pneumothorax is seen.     Impression: No significant interval change.     This report was finalized on 11/26/2022 4:34 AM by Dr. Reina Peacock M.D.       Scheduled Medications  apixaban, 10 mg, Oral, Q12H   Followed by  [START ON 12/8/2022] apixaban, 5 mg, Oral, Q12H  arformoterol, 15 mcg, Nebulization, BID - RT  guaiFENesin, 600 mg, Oral, Q12H  lisinopril, 40 mg, Oral, Daily  sodium chloride, 10 mL, Intravenous, Q12H  tiotropium bromide monohydrate, 2 puff, Inhalation, Daily - RT    Infusions   Diet  Diet: Regular/House Diet; Texture: Regular Texture (IDDSI 7); Fluid Consistency: Thin (IDDSI 0)       Assessment/Plan     Active Hospital Problems    Diagnosis  POA   • **Acute pulmonary embolism (HCC) [I26.99]  Yes   • History of DVT (deep vein thrombosis) [Z86.718]  Not Applicable   • COPD (chronic obstructive pulmonary disease) (HCC) [J44.9]  Yes   • Hyperlipidemia [E78.5]  Yes   • Elevated liver function tests [R79.89]  Yes   • Hyperglycemia [R73.9]  Yes   • Hypertension [I10]  Yes   • Tobacco abuse [Z72.0]  Yes      Resolved Hospital Problems   No resolved problems to display.       62 y.o. female admitted with Acute pulmonary embolism (HCC).    · Acute pulmonary embolism/acute right lower extremity DVT causing acute hypoxic respiratory failure-stabilized in the ICU. Now on eliquis. Still requiring 1-2L of oxygen. She's clinically improved. Will plan for walking oximetry tomorrow morning and discharge home.  · Anemia-mild. Improved to 11.2  · COPD-no wheezing  · Essential hypertension-adequate control acutely  · Elevated LFTs-mild  · Hyperlipidemia-statin is held for mildly elevated LFTs  · Hyperglycemia-a1c is 6.2  · new eliquis script for DVT prophylaxis.  · Full code.  · Discussed with patient and nursing staff.  · Anticipate discharge home tomorrow.      Grzegorz Palafox MD  [facility]  12/02/22  16:10 EST    I wore protective  equipment throughout this patient encounter including a face mask, gloves and protective eyewear.  Hand hygiene was performed before donning protective equipment and after removal when leaving the room.

## 2022-12-03 VITALS
WEIGHT: 216.93 LBS | BODY MASS INDEX: 34.05 KG/M2 | RESPIRATION RATE: 18 BRPM | SYSTOLIC BLOOD PRESSURE: 121 MMHG | HEART RATE: 104 BPM | OXYGEN SATURATION: 93 % | HEIGHT: 67 IN | DIASTOLIC BLOOD PRESSURE: 84 MMHG | TEMPERATURE: 98.2 F

## 2022-12-03 PROBLEM — R73.9 HYPERGLYCEMIA: Status: RESOLVED | Noted: 2022-11-25 | Resolved: 2022-12-03

## 2022-12-03 PROBLEM — R79.89 ELEVATED LIVER FUNCTION TESTS: Status: RESOLVED | Noted: 2022-11-25 | Resolved: 2022-12-03

## 2022-12-03 PROCEDURE — 94799 UNLISTED PULMONARY SVC/PX: CPT

## 2022-12-03 RX ORDER — ALBUTEROL SULFATE 2.5 MG/3ML
2.5 SOLUTION RESPIRATORY (INHALATION) EVERY 6 HOURS PRN
Qty: 360 ML | Refills: 12 | Status: SHIPPED | OUTPATIENT
Start: 2022-12-03 | End: 2023-01-02

## 2022-12-03 RX ADMIN — Medication 10 ML: at 08:58

## 2022-12-03 RX ADMIN — ARFORMOTEROL TARTRATE 15 MCG: 15 SOLUTION RESPIRATORY (INHALATION) at 07:49

## 2022-12-03 RX ADMIN — APIXABAN 10 MG: 5 TABLET, FILM COATED ORAL at 08:57

## 2022-12-03 RX ADMIN — GUAIFENESIN 600 MG: 600 TABLET, EXTENDED RELEASE ORAL at 08:57

## 2022-12-03 NOTE — CASE MANAGEMENT/SOCIAL WORK
Continued Stay Note  [facility]     Patient Name: Mariana Loya  MRN: [id number]  Today's Date: 12/3/2022    Admit Date: 11/24/2022    Plan: Home, oxygen and nebulizer through [facility]   Discharge Plan     Row Name 12/03/22 1156       Plan    Plan Home, oxygen and nebulizer through [facility]    Patient/Family in Agreement with Plan yes    Plan Comments Home nebulizer and oxygen set up through [facility] (spoke with Allyson/Radha). Instructed pt to call [facility] and press option 1 as soon as she gets home, phone number given. Nebulizer and portable tank delivered to pt in room. RN updated. Saw CRUZ               Discharge Codes    No documentation.               Expected Discharge Date and Time     Expected Discharge Date Expected Discharge Time    Dec 3, 2022             Saw Baez, RN

## 2022-12-03 NOTE — DISCHARGE SUMMARY
Patient Name: Mariana Loya  : 1960  MRN: [id number]    Date of Admission: 2022  Date of Discharge:  12/3/2022  Primary Care Physician: Brigitte Alvarado APRN      Chief Complaint:   Shortness of Breath      Discharge Diagnoses     Active Hospital Problems    Diagnosis  POA   • **Acute pulmonary embolism (HCC) [I26.99]  Yes   • History of DVT (deep vein thrombosis) [Z86.718]  Not Applicable   • COPD (chronic obstructive pulmonary disease) (HCC) [J44.9]  Yes   • Hyperlipidemia [E78.5]  Yes   • Hypertension [I10]  Yes   • Tobacco abuse [Z72.0]  Yes      Resolved Hospital Problems    Diagnosis Date Resolved POA   • Elevated liver function tests [R79.89] 2022 Yes   • Hyperglycemia [R73.9] 2022 Yes        Hospital Course     Ms. Loya is a 62 y.o. female with a history of hypertension, tobacco use, prior dvt who presented to [facility] initially complaining of left sided pleuritic chest discomfort, cough, and shortness of breath.  Please see the admitting history and physical for further details.  She was found to have a submassive pulmonary embolism and was admitted to the hospital for further evaluation and treatment.      She was started on blood thinners and stabilized in the ICU. After she improved, she was able to be transferred to the floor where she was transitioned to eliquis. She underwent walking oximetry at discharge which showed 2L exertional O2 requirement. She will need to be on anticoagulation indefinitely as this is a recurrence of DVT.    Diffuse pulmonary emphysema was seen on her CTA chest and so she will be discharged with albuterol nebulizers prn and Spiriva. She will need to follow up with pulmonology.     Day of Discharge     Subjective:  No events overnight. She did qualify for 2L of home O2 with exertion this morning. She feels well. No complaints.    Physical Exam:  Temp:  [97.9 °F (36.6 °C)-98.4 °F (36.9 °C)] 98.2 °F (36.8 °C)  Heart Rate:   [] 104  Resp:  [18-20] 18  BP: (121-153)/(66-91) 121/84  Body mass index is 33.98 kg/m².  Physical Exam  Constitutional:       General: She is not in acute distress.     Appearance: She is obese. She is not toxic-appearing.   Cardiovascular:      Rate and Rhythm: Normal rate and regular rhythm.      Heart sounds: Normal heart sounds.   Pulmonary:      Effort: Pulmonary effort is normal.      Breath sounds: Normal breath sounds.   Abdominal:      General: Bowel sounds are normal.      Palpations: Abdomen is soft.   Musculoskeletal:         General: No tenderness.      Right lower leg: No edema.      Left lower leg: No edema.   Neurological:      Mental Status: She is alert.   Psychiatric:         Mood and Affect: Mood normal.         Behavior: Behavior normal.         Consultants     Consult Orders (all) (From admission, onward)     Start     Ordered    11/25/22 0550  Inpatient Pulmonology Consult  Once        Specialty:  Pulmonary Disease  Provider:  Dino Crocker Jr., MD    11/25/22 0550    11/24/22 1631  Inpatient Case Management  Consult  Once        Provider:  (Not yet assigned)    11/24/22 1644    11/24/22 1422  [facility] (on-call MD unless specified) Details  Once        Specialty:  Hospitalist  Provider:  Jan Andersen MD    11/24/22 1421    11/24/22 1422  Interventional Cardiology (on-call MD unless specified)  Once        Specialty:  Interventional Cardiology  Provider:  Fer Mcbride MD    11/24/22 1421              Procedures     Imaging Results (All)     Procedure Component Value Units Date/Time    CT Angiogram Chest [[id number]] Collected: 11/26/22 1633     Updated: 11/26/22 1702    Narrative:      CT ANGIOGRAM OF THE CHEST. MULTIPLE CORONAL, SAGITTAL, AND 3-D  RECONSTRUCTIONS.     HISTORY: Shortness of air.     TECHNIQUE: Radiation dose reduction techniques were utilized, including  automated exposure control and exposure modulation based on body size.   CT angiogram of the  chest was performed following the administration of  IV contrast. Coronal, sagittal, and 3-D reconstruction images were  obtained.     COMPARISON:  CT angiogram chest 11/24/2022, CT abdomen and pelvis  09/08/2018     FINDINGS: There remains a pulmonary embolus within the left main  pulmonary artery that begins at its origin and is contiguous with  occlusive embolus extending to the anterior segment left upper lobe and  nonocclusive embolus extending to the distal left main pulmonary artery.  Left lower lobe pulmonary emboli extend into anteromedial, lateral  basal, and posterior basal segments with similar pattern to the previous  exam.     There is no embolus within the right main pulmonary artery and there is  no definite embolus within the right middle lobe segmental artery. There  is an embolus within the posterior basal segment of the right lower lobe  without change.     When compared to the exam 2 days ago, there is developed increased  bilateral lower lobe lobe consolidation and this is greater on the right  and air bronchograms are present. There is also developed increased  peripheral inferior left upper lobe wedge-shaped opacity that measures  2.5 cm in height at its peripheral margin x 4 cm transverse x 8 cm AP  and is consistent with developing pulmonary infarction. There is diffuse  pulmonary emphysema. No central endotracheal lesion is demonstrated.  There is a small left pleural effusion     Imaging through the upper abdomen demonstrates cholecystectomy clips.  There is a midline upper abdomen ventral hernia contains fat.  There are  mildly enlarged mediastinal lymph nodes. A right pretracheal node  measures 13 mm. Subcarinal node measures 18 mm short axis.       Impression:      1. Pulmonary thromboembolic disease without evidence for significant  change in extent of emboli when compared to the exam 2 days ago. The  largest embolus begins at the origin of the left main pulmonary artery  and emboli  are more extensive on the left as compared to the right.  2. Wedge-shaped peripheral opacity within the inferolateral left upper  lobe consistent with pulmonary infarction and this measures  approximately 8 x 4 x 2.5 cm.  3. Increased bilateral lower lobe consolidation with small left pleural  effusion.  4. Diffuse pulmonary emphysema. Mild mediastinal madhuri enlargement  without change.     This report was finalized on 11/26/2022 4:58 PM by Dr. Will Li M.D.       XR Chest 1 View [[id number]] Collected: 11/26/22 0431     Updated: 11/26/22 0437    Narrative:      SINGLE VIEW OF THE CHEST     HISTORY: Increasing oxygen requirement     COMPARISON: 11/20/2022     FINDINGS:  Cardiomegaly is present. Bibasilar consolidation is again noted.  Bilateral pulmonary opacities are again noted, suspicious for areas  pulmonary infarction. Small bilateral effusions are suspected. No  pneumothorax is seen.       Impression:      No significant interval change.     This report was finalized on 11/26/2022 4:34 AM by Dr. Reina Peacock M.D.       XR Chest 1 View [[id number]] Collected: 11/25/22 0722     Updated: 11/25/22 0728    Narrative:      XR CHEST 1 VW-     HISTORY:  Hypoxia.     COMPARISON:  Chest radiograph and CT chest 11/24/2022     FINDINGS:    A single view of the chest was obtained. The cardiac silhouette and  mediastinal and hilar contours are not significantly changed. Bilateral  airspace opacities have slightly progressed from 11/24/2022 and raise  concern for a combination of atelectasis and pulmonary infarcts based on  the recent CT chest. There are new slightly prominent interstitial  opacities, which could reflect superimposed interstitial pulmonary  edema. There are questioned trace bilateral pleural effusions. The  visualized osseous structures are unchanged.     This report was finalized on 11/25/2022 7:25 AM by Dr. Fatou Li M.D.       CT Angiogram Chest [[id number]] Collected: 11/24/22 1415      Updated: 11/24/22 1423    Narrative:      CT ANGIOGRAM CHEST-     INDICATIONS: Chest pain.  Radiation dose reduction techniques were  utilized, including automated exposure control and exposure modulation  based on body size.     TECHNIQUE: CT angiography of the chest. Three-dimensional  reconstructions.     COMPARISON: None available     FINDINGS:     Bilateral pulmonary embolic disease is present, beginning at the left  main pulmonary artery extending into multiple branches of the left, and  also present right lower lobe segmental branches. The RV LV ratio is  1.1, that could reflect right heart strain.     The heart size is normal without pericardial effusion. A right  paratracheal lymph node measures 1.2 cm short axis on axial image 56,  mildly prominent, nonspecific, could be reactive in nature or  potentially evidence of neoplasm, correlation follow-up recommended.     Thyroid nodularity is suggested, but suboptimally evaluated with this  technique, thyroid ultrasound correlation can be obtained as indicated.     The airways appear clear.     No pleural effusion or pneumothorax.     The lungs show hazy pleural-based opacities in the left, at the upper  lobe that could be areas infarct, for example axial images 66, 71.  Apical predominant emphysematous changes are seen. Likely atelectasis is  present in both lungs.     Upper abdominal structures show no acute findings. Relative low-density  of the liver suggests steatosis. Colonic diverticula are seen that do  not appear inflamed. Small hiatal hernia is apparent. A gastrohepatic  ligament lymph node is borderline prominent, 1.1 cm short axis, axial  image 144.     Degenerative changes are seen in the spine. No acute fracture is  identified.       Impression:         Critical test result. Extensive pulmonary embolic disease, predominantly  on the left, with RV LV ratio 1.1. Possible areas of pulmonary infarct.     Mildly prominent, nonspecific mediastinal and  epigastric lymph nodes,  follow-up suggested.           Discussed by telephone with Enedina Messina at time of  interpretation, 1419, 11/24/2022.           This report was finalized on 11/24/2022 2:20 PM by Dr. Javier Abdi M.D.       XR Chest 2 View [[id number]] Collected: 11/24/22 1116     Updated: 11/24/22 1120    Narrative:      XR CHEST 2 VW-  11/24/2022     HISTORY: Shortness of breath.     Heart size is within normal limits. There is mild to moderate patchy  increased density in the left lower lobe. Linear band of atelectasis or  scarring is seen in the right middle lobe with some mild atelectasis or  infiltrate in the right middle lobe and/or right lower lobe.     There are no previous studies available for comparison.     Heart size is within normal limits.       Impression:      1. Patchy areas of atelectasis and/or pneumonia in the bilateral lower  lobes and right middle lobe and there may be some minimal increased  density in the lingula as well.  2. Linear band of atelectasis in the right middle lobe.  3. Follow-up films recommended.     This report was finalized on 11/24/2022 11:17 AM by Dr. James Singh M.D.             Pertinent Labs     Results from last 7 days   Lab Units 12/02/22 0338 11/30/22 0432 11/29/22  0543 11/28/22  0409   WBC 10*3/mm3 8.54 8.29 8.06 8.74   HEMOGLOBIN g/dL 11.2* 10.7* 10.9* 11.4*   PLATELETS 10*3/mm3 231 217 223 194     Results from last 7 days   Lab Units 12/02/22 0338 11/30/22  0432 11/29/22  0543 11/28/22  0409   SODIUM mmol/L 137 136 139 137   POTASSIUM mmol/L 4.3 3.4* 3.8 3.9   CHLORIDE mmol/L 104 103 107 104   CO2 mmol/L 23.2 22.5 23.0 23.2   BUN mg/dL 10 11 10 16   CREATININE mg/dL 0.64 0.57 0.54* 0.63   GLUCOSE mg/dL 137* 188* 128* 135*   Estimated Creatinine Clearance: 108.4 mL/min (by C-G formula based on SCr of 0.64 mg/dL).  Results from last 7 days   Lab Units 12/02/22  0338 11/30/22  0432 11/29/22  0543 11/28/22  0409   ALBUMIN g/dL 2.70*  3.00* 2.90* 3.00*   BILIRUBIN mg/dL 0.2 0.2 0.3 0.4   ALK PHOS U/L 89 94 101 107   AST (SGOT) U/L 35* 28 28 46*   ALT (SGPT) U/L 55* 52* 58* 60*     Results from last 7 days   Lab Units 12/02/22  0338 11/30/22  0432 11/29/22  0543 11/28/22  0409   CALCIUM mg/dL 8.7 8.7 8.6 8.5*   ALBUMIN g/dL 2.70* 3.00* 2.90* 3.00*   MAGNESIUM mg/dL 2.0  --   --   --                Invalid input(s): LDLCALC        Test Results Pending at Discharge     Pending Labs     Order Current Status    Antiphosphotidyl Antibodies Panel II In process          Discharge Details        Discharge Medications      New Medications      Instructions Start Date   albuterol (2.5 MG/3ML) 0.083% nebulizer solution  Commonly known as: PROVENTIL   2.5 mg, Nebulization, Every 6 Hours PRN      apixaban 5 MG tablet tablet  Commonly known as: ELIQUIS   10 mg, Oral, Every 12 Hours Scheduled      apixaban 5 MG tablet tablet  Commonly known as: ELIQUIS   5 mg, Oral, Every 12 Hours Scheduled   Start Date: December 8, 2022     tiotropium bromide monohydrate 2.5 MCG/ACT aerosol solution inhaler  Commonly known as: SPIRIVA RESPIMAT   2 puffs, Inhalation, Daily - RT         Continue These Medications      Instructions Start Date   lisinopril 30 MG tablet  Commonly known as: PRINIVIL,ZESTRIL   30 mg, Oral, Daily             No Known Allergies      Discharge Disposition:  Home or Self Care    Discharge Diet:  Diet Order   Procedures   • Diet: Regular/House Diet; Texture: Regular Texture (IDDSI 7); Fluid Consistency: Thin (IDDSI 0)       Discharge Activity:   Activity Instructions     Activity as Tolerated            CODE STATUS:    Code Status and Medical Interventions:   Ordered at: 11/24/22 1542     Code Status (Patient has no pulse and is not breathing):    CPR (Attempt to Resuscitate)     Medical Interventions (Patient has pulse or is breathing):    Full Support       Future Appointments   Date Time Provider Department Center   1/6/2023  8:30 AM Kody Diaz  MD [facility]   1/6/2023 10:30 AM Leno Garcia MD [facility]     Additional Instructions for the Follow-ups that You Need to Schedule     Call MD With Problems / Concerns   As directed      Instructions: return to the hospital if you experience chest pain, shortness of breath, abdominal pain, nausea, vomiting, fevers, sweats, chills, or worsening of your symptoms    Order Comments: Instructions: return to the hospital if you experience chest pain, shortness of breath, abdominal pain, nausea, vomiting, fevers, sweats, chills, or worsening of your symptoms          Discharge Follow-up with PCP   As directed       Currently Documented PCP:    Brigitte Alvarado APRN    PCP Phone Number:    [phone]     Follow Up Details: 2 weeks         Discharge Follow-up with Specialty: pulmonology, 2-4 weeks or as otherwise directed   As directed      Specialty: pulmonology, 2-4 weeks or as otherwise directed            Follow-up Information     Brigitte Alvarado APRN .    Specialty: Nurse Practitioner  Why: 2 weeks  Contact information:  [address] [address]  [phone]                         Additional Instructions for the Follow-ups that You Need to Schedule     Call MD With Problems / Concerns   As directed      Instructions: return to the hospital if you experience chest pain, shortness of breath, abdominal pain, nausea, vomiting, fevers, sweats, chills, or worsening of your symptoms    Order Comments: Instructions: return to the hospital if you experience chest pain, shortness of breath, abdominal pain, nausea, vomiting, fevers, sweats, chills, or worsening of your symptoms          Discharge Follow-up with PCP   As directed       Currently Documented PCP:    Brigitte Alvarado APRN    PCP Phone Number:    [phone]     Follow Up Details: 2 weeks         Discharge Follow-up with Specialty: pulmonology, 2-4 weeks or as otherwise directed   As directed       Specialty: pulmonology, 2-4 weeks or as otherwise directed           Time Spent on Discharge:  Greater than 30 minutes      Grzegorz Palafox MD  [facility]  12/03/22  10:06 EST

## 2022-12-03 NOTE — PROGRESS NOTES
Exercise Oximetry    Patient Name:Mariana Loya   MRN: [id number]   Date: 12/03/22             ROOM AIR BASELINE   SpO2 93   Heart Rate 99   Blood Pressure 147/71     EXERCISE ON ROOM AIR SpO2% EXERCISE ON O2 @ 2 LPM SpO2%   1 MINUTE 90 1 MINUTE 92   2 MINUTES 87 2 MINUTES 93   3 MINUTES  3 MINUTES 91   4 MINUTES  4 MINUTES 91   5 MINUTES  5 MINUTES 91   6 MINUTES  6 MINUTES 92              Distance Walked   Distance Walked   Dyspnea (Rashawn Scale)   Dyspnea (Rashawn Scale)   Fatigue (Rashawn Scale)   Fatigue (Rashawn Scale)   SpO2% Post Exercise  94 SpO2% Post Exercise   HR Post Exercise  109 HR Post Exercise   Time to Recovery  1 min Time to Recovery     Comments: Walked on a flat surface at a pace comfortable for her.Pt. c/o of small amount of fatigue and SOA.  Desi Ryan, RRT, [facility]  12/03/22  08:07 EST

## 2022-12-04 ENCOUNTER — READMISSION MANAGEMENT (OUTPATIENT)
Dept: CALL CENTER | Facility: HOSPITAL | Age: 62
End: 2022-12-04

## 2022-12-04 NOTE — OUTREACH NOTE
Prep Survey    Flowsheet Row Responses   [facility] facility patient discharged from? [facility]   Is LACE score < 7 ? No   Emergency Room discharge w/ pulse ox? No   Eligibility Readm Mgmt   Discharge diagnosis **Acute pulmonary embolism    Does the patient have one of the following disease processes/diagnoses(primary or secondary)? Other   Does the patient have Home health ordered? No   Is there a DME ordered? Yes   What DME was ordered? Home, oxygen and nebulizer through [facility]   Prep survey completed? Yes          MARGARITO ROLLINS - Registered Nurse

## 2022-12-04 NOTE — PROGRESS NOTES
"Enter Query Response Below      Query Response: Acute hypoxemic respiratory failure present on admission             If applicable, please update the problem list.     Patient: Mariana Loya        : 1960  Account: [id number]           Admit Date: 2022        How to Respond to this query:       a. Click New Note     b. Answer query within the yellow box.                c. Update the Problem List, if applicable.      If you have any questions about this query contact me at: [email or web address]     Dr. Rouse,     61 yo female admtted 22 for \"acute pulmonary embolism\" per History and Physical.  Progress note dated 22 includes, \" Acute hypoxemic respiratory failure secondary to PE and pulmonary infarction.\"  [facility] laboratory results as follows: pO2 62.3, pH 7.386, pCO2 36.6 flow rate 10 22 @ 1342.  Treatment included O2 up to 12 LPM and monitoring in ICU.    After study, can you please clarify present on admit status of acute hypoxemic respiratory failure as    Present on admit  Developed after admit  Other (please specify)____________  Unable to determine    By submitting this query, we are merely seeking further clarification of documentation to accurately reflect all conditions that you are monitoring, evaluating, treating or that extend the hospitalization or utilize additional resources of care. Please utilize your independent clinical judgment when addressing the question(s) above.     This query and your response, once completed, will be entered into the legal medical record.    Sincerely,  Luisa BOWLES RN CCDS  System Director Clinical Documentation Integrity Program     "

## 2022-12-05 NOTE — CASE MANAGEMENT/SOCIAL WORK
Case Management Discharge Note      Final Note: Home with O2/Nebulizer thru [facility]. No additional CCP needs. [place] RN/CCP    Provided Post Acute Provider List?: Yes  Post Acute Provider List: DME Supplier  Provided Post Acute Provider Quality & Resource List?: Refused    Selected Continued Care - Discharged on 12/3/2022 Admission date: 11/24/2022 - Discharge disposition: Home or Self Care    Destination    No services have been selected for the patient.              Durable Medical Equipment     Service Provider Selected Services Address Phone Fax Patient Preferred    [facility] [address] [address] [phone] --          Dialysis/Infusion    No services have been selected for the patient.              Home Medical Care    No services have been selected for the patient.              Therapy    No services have been selected for the patient.              Community Resources    No services have been selected for the patient.              Community & DME    No services have been selected for the patient.                       Final Discharge Disposition Code: 01 - home or self-care

## 2022-12-06 NOTE — PAYOR COMM NOTE
"Fatou Loya (62 y.o. Female)     PLEASE SEE ATTACHED FOR DC NOTICE    REF #  [id number]      THANK YOU  JOYCELYN QUISPE RN/UM DEPT  [facility]   [phone]  [phone]    Date of Birth   1960    Social Security Number       Address   [facility] [address] [place] [id number]    Home Phone   [phone]    MRN   [id number]       [employer]   Worship    Marital Status                               Admission Date   22    Admission Type   Emergency    Admitting Provider   Jan Andersen MD    Attending Provider       Department, Room/Bed   [facility] 4 [facility], S407/1       Discharge Date   12/3/2022    Discharge Disposition   Home or Self Care    Discharge Destination                               Attending Provider: (none)   Allergies: No Known Allergies    Isolation: None   Infection: None   Code Status: Prior    Ht: 170.2 cm (67\")   Wt: 98.4 kg (216 lb 14.9 oz)    Admission Cmt: None   Principal Problem: Acute pulmonary embolism (HCC) [I26.99]                 Active Insurance as of 2022     Primary Coverage     Payor Plan Insurance Group Employer/Plan Group    [employer] BLUE [employer]OSS [employer] BLUE CROSS BLUE [employer]IELD PPO [id number]     Payor Plan Address Payor Plan Phone Number Payor Plan Fax Number Effective Dates    [address] [phone]  2022 - None Entered    [place] [address]       Subscriber Name Subscriber Birth Date Member ID       FATOU LOYA 1960 [id number]                 Emergency Contacts      (Rel.) Home Phone Work Phone Mobile Phone    MARISELA FAJARDO (Friend) -- -- [phone]            [place]ickery: [facility] [id number]  Tax ID [id number]     Discharge Summary      Grzegorz Palafox MD at 22 1006              Patient Name: Fatou Loya  : 1960  MRN: [id number]    Date of Admission: 2022  Date of Discharge:  12/3/2022  Primary Care Physician: Brigitte Alvarado, JAVIER      Chief Complaint:   Shortness of " Breath      Discharge Diagnoses     Active Hospital Problems    Diagnosis  POA   • **Acute pulmonary embolism (HCC) [I26.99]  Yes   • History of DVT (deep vein thrombosis) [Z86.718]  Not Applicable   • COPD (chronic obstructive pulmonary disease) (HCC) [J44.9]  Yes   • Hyperlipidemia [E78.5]  Yes   • Hypertension [I10]  Yes   • Tobacco abuse [Z72.0]  Yes      Resolved Hospital Problems    Diagnosis Date Resolved POA   • Elevated liver function tests [R79.89] 12/03/2022 Yes   • Hyperglycemia [R73.9] 12/03/2022 Yes        Hospital Course     Ms. Loya is a 62 y.o. female with a history of hypertension, tobacco use, prior dvt who presented to [facility] initially complaining of left sided pleuritic chest discomfort, cough, and shortness of breath.  Please see the admitting history and physical for further details.  She was found to have a submassive pulmonary embolism and was admitted to the hospital for further evaluation and treatment.      She was started on blood thinners and stabilized in the ICU. After she improved, she was able to be transferred to the floor where she was transitioned to eliquis. She underwent walking oximetry at discharge which showed 2L exertional O2 requirement. She will need to be on anticoagulation indefinitely as this is a recurrence of DVT.    Diffuse pulmonary emphysema was seen on her CTA chest and so she will be discharged with albuterol nebulizers prn and Spiriva. She will need to follow up with pulmonology.     Day of Discharge     Subjective:  No events overnight. She did qualify for 2L of home O2 with exertion this morning. She feels well. No complaints.    Physical Exam:  Temp:  [97.9 °F (36.6 °C)-98.4 °F (36.9 °C)] 98.2 °F (36.8 °C)  Heart Rate:  [] 104  Resp:  [18-20] 18  BP: (121-153)/(66-91) 121/84  Body mass index is 33.98 kg/m².  Physical Exam  Constitutional:       General: She is not in acute distress.     Appearance: She is obese. She is not  toxic-appearing.   Cardiovascular:      Rate and Rhythm: Normal rate and regular rhythm.      Heart sounds: Normal heart sounds.   Pulmonary:      Effort: Pulmonary effort is normal.      Breath sounds: Normal breath sounds.   Abdominal:      General: Bowel sounds are normal.      Palpations: Abdomen is soft.   Musculoskeletal:         General: No tenderness.      Right lower leg: No edema.      Left lower leg: No edema.   Neurological:      Mental Status: She is alert.   Psychiatric:         Mood and Affect: Mood normal.         Behavior: Behavior normal.         Consultants     Consult Orders (all) (From admission, onward)     Start     Ordered    11/25/22 0550  Inpatient Pulmonology Consult  Once        Specialty:  Pulmonary Disease  Provider:  Dino Crocker Jr., MD    11/25/22 0550    11/24/22 1631  Inpatient Case Management  Consult  Once        Provider:  (Not yet assigned)    11/24/22 1644    11/24/22 1422  [facility] (on-call MD unless specified) Details  Once        Specialty:  Hospitalist  Provider:  Jan Andersen MD    11/24/22 1421    11/24/22 1422  Interventional Cardiology (on-call MD unless specified)  Once        Specialty:  Interventional Cardiology  Provider:  Fer Mcbride MD    11/24/22 1421              Procedures     Imaging Results (All)     Procedure Component Value Units Date/Time    CT Angiogram Chest [[id number]] Collected: 11/26/22 1633     Updated: 11/26/22 1702    Narrative:      CT ANGIOGRAM OF THE CHEST. MULTIPLE CORONAL, SAGITTAL, AND 3-D  RECONSTRUCTIONS.     HISTORY: Shortness of air.     TECHNIQUE: Radiation dose reduction techniques were utilized, including  automated exposure control and exposure modulation based on body size.   CT angiogram of the chest was performed following the administration of  IV contrast. Coronal, sagittal, and 3-D reconstruction images were  obtained.     COMPARISON:  CT angiogram chest 11/24/2022, CT abdomen and  pelvis  09/08/2018     FINDINGS: There remains a pulmonary embolus within the left main  pulmonary artery that begins at its origin and is contiguous with  occlusive embolus extending to the anterior segment left upper lobe and  nonocclusive embolus extending to the distal left main pulmonary artery.  Left lower lobe pulmonary emboli extend into anteromedial, lateral  basal, and posterior basal segments with similar pattern to the previous  exam.     There is no embolus within the right main pulmonary artery and there is  no definite embolus within the right middle lobe segmental artery. There  is an embolus within the posterior basal segment of the right lower lobe  without change.     When compared to the exam 2 days ago, there is developed increased  bilateral lower lobe lobe consolidation and this is greater on the right  and air bronchograms are present. There is also developed increased  peripheral inferior left upper lobe wedge-shaped opacity that measures  2.5 cm in height at its peripheral margin x 4 cm transverse x 8 cm AP  and is consistent with developing pulmonary infarction. There is diffuse  pulmonary emphysema. No central endotracheal lesion is demonstrated.  There is a small left pleural effusion     Imaging through the upper abdomen demonstrates cholecystectomy clips.  There is a midline upper abdomen ventral hernia contains fat.  There are  mildly enlarged mediastinal lymph nodes. A right pretracheal node  measures 13 mm. Subcarinal node measures 18 mm short axis.       Impression:      1. Pulmonary thromboembolic disease without evidence for significant  change in extent of emboli when compared to the exam 2 days ago. The  largest embolus begins at the origin of the left main pulmonary artery  and emboli are more extensive on the left as compared to the right.  2. Wedge-shaped peripheral opacity within the inferolateral left upper  lobe consistent with pulmonary infarction and this  measures  approximately 8 x 4 x 2.5 cm.  3. Increased bilateral lower lobe consolidation with small left pleural  effusion.  4. Diffuse pulmonary emphysema. Mild mediastinal madhuri enlargement  without change.     This report was finalized on 11/26/2022 4:58 PM by Dr. Will Li M.D.       XR Chest 1 View [[id number]] Collected: 11/26/22 0431     Updated: 11/26/22 0437    Narrative:      SINGLE VIEW OF THE CHEST     HISTORY: Increasing oxygen requirement     COMPARISON: 11/20/2022     FINDINGS:  Cardiomegaly is present. Bibasilar consolidation is again noted.  Bilateral pulmonary opacities are again noted, suspicious for areas  pulmonary infarction. Small bilateral effusions are suspected. No  pneumothorax is seen.       Impression:      No significant interval change.     This report was finalized on 11/26/2022 4:34 AM by Dr. Reina Peacock M.D.       XR Chest 1 View [[id number]] Collected: 11/25/22 0722     Updated: 11/25/22 0728    Narrative:      XR CHEST 1 VW-     HISTORY:  Hypoxia.     COMPARISON:  Chest radiograph and CT chest 11/24/2022     FINDINGS:    A single view of the chest was obtained. The cardiac silhouette and  mediastinal and hilar contours are not significantly changed. Bilateral  airspace opacities have slightly progressed from 11/24/2022 and raise  concern for a combination of atelectasis and pulmonary infarcts based on  the recent CT chest. There are new slightly prominent interstitial  opacities, which could reflect superimposed interstitial pulmonary  edema. There are questioned trace bilateral pleural effusions. The  visualized osseous structures are unchanged.     This report was finalized on 11/25/2022 7:25 AM by Dr. Fatou Li M.D.       CT Angiogram Chest [[id number]] Collected: 11/24/22 1410     Updated: 11/24/22 1423    Narrative:      CT ANGIOGRAM CHEST-     INDICATIONS: Chest pain.  Radiation dose reduction techniques were  utilized, including automated exposure control  and exposure modulation  based on body size.     TECHNIQUE: CT angiography of the chest. Three-dimensional  reconstructions.     COMPARISON: None available     FINDINGS:     Bilateral pulmonary embolic disease is present, beginning at the left  main pulmonary artery extending into multiple branches of the left, and  also present right lower lobe segmental branches. The RV LV ratio is  1.1, that could reflect right heart strain.     The heart size is normal without pericardial effusion. A right  paratracheal lymph node measures 1.2 cm short axis on axial image 56,  mildly prominent, nonspecific, could be reactive in nature or  potentially evidence of neoplasm, correlation follow-up recommended.     Thyroid nodularity is suggested, but suboptimally evaluated with this  technique, thyroid ultrasound correlation can be obtained as indicated.     The airways appear clear.     No pleural effusion or pneumothorax.     The lungs show hazy pleural-based opacities in the left, at the upper  lobe that could be areas infarct, for example axial images 66, 71.  Apical predominant emphysematous changes are seen. Likely atelectasis is  present in both lungs.     Upper abdominal structures show no acute findings. Relative low-density  of the liver suggests steatosis. Colonic diverticula are seen that do  not appear inflamed. Small hiatal hernia is apparent. A gastrohepatic  ligament lymph node is borderline prominent, 1.1 cm short axis, axial  image 144.     Degenerative changes are seen in the spine. No acute fracture is  identified.       Impression:         Critical test result. Extensive pulmonary embolic disease, predominantly  on the left, with RV LV ratio 1.1. Possible areas of pulmonary infarct.     Mildly prominent, nonspecific mediastinal and epigastric lymph nodes,  follow-up suggested.           Discussed by telephone with Enedina Messina at time of  interpretation, 1419, 11/24/2022.           This report was  finalized on 11/24/2022 2:20 PM by Dr. Javier Abdi M.D.       XR Chest 2 View [[id number]] Collected: 11/24/22 1116     Updated: 11/24/22 1120    Narrative:      XR CHEST 2 VW-  11/24/2022     HISTORY: Shortness of breath.     Heart size is within normal limits. There is mild to moderate patchy  increased density in the left lower lobe. Linear band of atelectasis or  scarring is seen in the right middle lobe with some mild atelectasis or  infiltrate in the right middle lobe and/or right lower lobe.     There are no previous studies available for comparison.     Heart size is within normal limits.       Impression:      1. Patchy areas of atelectasis and/or pneumonia in the bilateral lower  lobes and right middle lobe and there may be some minimal increased  density in the lingula as well.  2. Linear band of atelectasis in the right middle lobe.  3. Follow-up films recommended.     This report was finalized on 11/24/2022 11:17 AM by Dr. James Singh M.D.             Pertinent Labs     Results from last 7 days   Lab Units 12/02/22 0338 11/30/22 0432 11/29/22 0543 11/28/22  0409   WBC 10*3/mm3 8.54 8.29 8.06 8.74   HEMOGLOBIN g/dL 11.2* 10.7* 10.9* 11.4*   PLATELETS 10*3/mm3 231 217 223 194     Results from last 7 days   Lab Units 12/02/22 0338 11/30/22 0432 11/29/22 0543 11/28/22  0409   SODIUM mmol/L 137 136 139 137   POTASSIUM mmol/L 4.3 3.4* 3.8 3.9   CHLORIDE mmol/L 104 103 107 104   CO2 mmol/L 23.2 22.5 23.0 23.2   BUN mg/dL 10 11 10 16   CREATININE mg/dL 0.64 0.57 0.54* 0.63   GLUCOSE mg/dL 137* 188* 128* 135*   Estimated Creatinine Clearance: 108.4 mL/min (by C-G formula based on SCr of 0.64 mg/dL).  Results from last 7 days   Lab Units 12/02/22 0338 11/30/22 0432 11/29/22 0543 11/28/22  0409   ALBUMIN g/dL 2.70* 3.00* 2.90* 3.00*   BILIRUBIN mg/dL 0.2 0.2 0.3 0.4   ALK PHOS U/L 89 94 101 107   AST (SGOT) U/L 35* 28 28 46*   ALT (SGPT) U/L 55* 52* 58* 60*     Results from last 7 days   Lab  Units 12/02/22  0338 11/30/22  0432 11/29/22  0543 11/28/22  0409   CALCIUM mg/dL 8.7 8.7 8.6 8.5*   ALBUMIN g/dL 2.70* 3.00* 2.90* 3.00*   MAGNESIUM mg/dL 2.0  --   --   --                Invalid input(s): LDLCALC        Test Results Pending at Discharge     Pending Labs     Order Current Status    Antiphosphotidyl Antibodies Panel II In process          Discharge Details        Discharge Medications      New Medications      Instructions Start Date   albuterol (2.5 MG/3ML) 0.083% nebulizer solution  Commonly known as: PROVENTIL   2.5 mg, Nebulization, Every 6 Hours PRN      apixaban 5 MG tablet tablet  Commonly known as: ELIQUIS   10 mg, Oral, Every 12 Hours Scheduled      apixaban 5 MG tablet tablet  Commonly known as: ELIQUIS   5 mg, Oral, Every 12 Hours Scheduled   Start Date: December 8, 2022     tiotropium bromide monohydrate 2.5 MCG/ACT aerosol solution inhaler  Commonly known as: SPIRIVA RESPIMAT   2 puffs, Inhalation, Daily - RT         Continue These Medications      Instructions Start Date   lisinopril 30 MG tablet  Commonly known as: PRINIVIL,ZESTRIL   30 mg, Oral, Daily             No Known Allergies      Discharge Disposition:  Home or Self Care    Discharge Diet:  Diet Order   Procedures   • Diet: Regular/House Diet; Texture: Regular Texture (IDDSI 7); Fluid Consistency: Thin (IDDSI 0)       Discharge Activity:   Activity Instructions     Activity as Tolerated            CODE STATUS:    Code Status and Medical Interventions:   Ordered at: 11/24/22 1542     Code Status (Patient has no pulse and is not breathing):    CPR (Attempt to Resuscitate)     Medical Interventions (Patient has pulse or is breathing):    Full Support       Future Appointments   Date Time Provider Department Center   1/6/2023  8:30 AM Kody Diaz MD [facility]   1/6/2023 10:30 AM Leno Garcia MD [facility]     Additional Instructions for the Follow-ups that You Need to Schedule     Call MD With Problems  / Concerns   As directed      Instructions: return to the hospital if you experience chest pain, shortness of breath, abdominal pain, nausea, vomiting, fevers, sweats, chills, or worsening of your symptoms    Order Comments: Instructions: return to the hospital if you experience chest pain, shortness of breath, abdominal pain, nausea, vomiting, fevers, sweats, chills, or worsening of your symptoms          Discharge Follow-up with PCP   As directed       Currently Documented PCP:    Brigitte Alvarado APRN    PCP Phone Number:    [phone]     Follow Up Details: 2 weeks         Discharge Follow-up with Specialty: pulmonology, 2-4 weeks or as otherwise directed   As directed      Specialty: pulmonology, 2-4 weeks or as otherwise directed            Follow-up Information     Brigitte Alvarado APRN .    Specialty: Nurse Practitioner  Why: 2 weeks  Contact information:  [address] [place] [address]  [phone]                         Additional Instructions for the Follow-ups that You Need to Schedule     Call MD With Problems / Concerns   As directed      Instructions: return to the hospital if you experience chest pain, shortness of breath, abdominal pain, nausea, vomiting, fevers, sweats, chills, or worsening of your symptoms    Order Comments: Instructions: return to the hospital if you experience chest pain, shortness of breath, abdominal pain, nausea, vomiting, fevers, sweats, chills, or worsening of your symptoms          Discharge Follow-up with PCP   As directed       Currently Documented PCP:    Brigitte Alvarado APRN    PCP Phone Number:    [phone]     Follow Up Details: 2 weeks         Discharge Follow-up with Specialty: pulmonology, 2-4 weeks or as otherwise directed   As directed      Specialty: pulmonology, 2-4 weeks or as otherwise directed           Time Spent on Discharge:  Greater than 30 minutes      Grzegorz Palafox MD  [facility]  12/03/22  10:06 EST              Electronically signed by Grzegorz Palafox MD at 12/03/22 1019

## 2022-12-07 ENCOUNTER — READMISSION MANAGEMENT (OUTPATIENT)
Dept: CALL CENTER | Facility: HOSPITAL | Age: 62
End: 2022-12-07

## 2022-12-07 NOTE — OUTREACH NOTE
Medical Week 1 Survey    Flowsheet Row Responses   [facility] patient discharged from? [place]   Does the patient have one of the following disease processes/diagnoses(primary or secondary)? Other   Week 1 attempt successful? Yes   Call start time 0914   Call end time 0915   Discharge diagnosis **Acute pulmonary embolism    Meds reviewed with patient/caregiver? Yes   Is the patient having any side effects they believe may be caused by any medication additions or changes? No   Does the patient have all medications ordered at discharge? Yes   Is the patient taking all medications as directed (includes completed medication regime)? Yes   Does the patient have a primary care provider?  Yes   Does the patient have an appointment with their PCP within 7 days of discharge? Yes   Comments regarding PCP says she has a f/u with PCP scheduled   Has the patient kept scheduled appointments due by today? N/A   Has home health visited the patient within 72 hours of discharge? N/A   What DME was ordered? Home, oxygen and nebulizer through [facility]   Psychosocial issues? No   Did the patient receive a copy of their discharge instructions? Yes   What is the patient's perception of their health status since discharge? Improving   Is the patient/caregiver able to teach back the hierarchy of who to call/visit for symptoms/problems? PCP, Specialist, Home health nurse, Urgent Care, ED, 911 Yes   Week 1 call completed? Yes   Graduated Yes   Is the patient interested in additional calls from an ambulatory ?  NOTE:  applies to high risk patients requiring additional follow-up. No   Did the patient feel the follow up calls were helpful during their recovery period? Yes   Was the number of calls appropriate? Yes   Graduated/Revoked comments Doing well, brief call, no further calls needed.          JOSSELIN MILLER - Registered Nurse

## 2023-01-03 LAB
ANTI-PHOSPHATIDIC ACID: NORMAL
ANTI-PHOSPHATIDYL GLYCEROL: NORMAL
ANTI-PHOSPHATIDYL INOSITOL: NORMAL
ANTI-PHOSPHATIDYLETHANOLAMINE: NORMAL
PE IGA SER-ACNC: 10.7 U/ML
PE IGG SER-ACNC: 3.5 U/ML
PE IGM SER-ACNC: 2.6 U/ML
PG IGA SER-ACNC: 5.7 U/ML
PG IGG SER-ACNC: 6 U/ML
PG IGM SER-ACNC: 4.8 U/ML
PHOSPHATIDATE IGA SER-ACNC: 2.7 U/ML
PHOSPHATIDATE IGG SER-ACNC: 1.6 U/ML
PHOSPHATIDATE IGM SER-ACNC: 2.9 U/ML
PI IGA SER-ACNC: NORMAL U/ML
PI IGG SER-ACNC: 3 U/ML
PI IGM SER-ACNC: 5.6 U/ML

## 2025-06-18 ENCOUNTER — APPOINTMENT (OUTPATIENT)
Dept: CT IMAGING | Facility: HOSPITAL | Age: 65
End: 2025-06-18
Payer: MEDICARE

## 2025-06-18 ENCOUNTER — HOSPITAL ENCOUNTER (OUTPATIENT)
Facility: HOSPITAL | Age: 65
Setting detail: OBSERVATION
Discharge: HOME | End: 2025-06-21
Attending: EMERGENCY MEDICINE | Admitting: EMERGENCY MEDICINE
Payer: MEDICARE

## 2025-06-18 DIAGNOSIS — J43.1 PANLOBULAR EMPHYSEMA: ICD-10-CM

## 2025-06-18 DIAGNOSIS — K43.6 INCARCERATED VENTRAL HERNIA: Primary | ICD-10-CM

## 2025-06-18 PROBLEM — K45.0 INCARCERATED HERNIA OF ABDOMINAL CAVITY: Status: ACTIVE | Noted: 2025-06-18

## 2025-06-18 LAB
ALBUMIN SERPL-MCNC: 3.4 G/DL (ref 3.5–5.2)
ALBUMIN/GLOB SERPL: 1 G/DL
ALP SERPL-CCNC: 87 U/L (ref 39–117)
ALT SERPL W P-5'-P-CCNC: 12 U/L (ref 1–33)
ANION GAP SERPL CALCULATED.3IONS-SCNC: 11 MMOL/L (ref 5–15)
AST SERPL-CCNC: 13 U/L (ref 1–32)
BASOPHILS # BLD AUTO: 0.02 10*3/MM3 (ref 0–0.2)
BASOPHILS NFR BLD AUTO: 0.3 % (ref 0–1.5)
BILIRUB SERPL-MCNC: 0.5 MG/DL (ref 0–1.2)
BUN SERPL-MCNC: 7 MG/DL (ref 8–23)
BUN/CREAT SERPL: 11.7 (ref 7–25)
CALCIUM SPEC-SCNC: 8.8 MG/DL (ref 8.6–10.5)
CHLORIDE SERPL-SCNC: 102 MMOL/L (ref 98–107)
CO2 SERPL-SCNC: 23 MMOL/L (ref 22–29)
CREAT SERPL-MCNC: 0.6 MG/DL (ref 0.57–1)
DEPRECATED RDW RBC AUTO: 39.9 FL (ref 37–54)
EGFRCR SERPLBLD CKD-EPI 2021: 99.1 ML/MIN/1.73
EOSINOPHIL # BLD AUTO: 0.09 10*3/MM3 (ref 0–0.4)
EOSINOPHIL NFR BLD AUTO: 1.6 % (ref 0.3–6.2)
ERYTHROCYTE [DISTWIDTH] IN BLOOD BY AUTOMATED COUNT: 12.5 % (ref 12.3–15.4)
GLOBULIN UR ELPH-MCNC: 3.3 GM/DL
GLUCOSE SERPL-MCNC: 142 MG/DL (ref 65–99)
HCT VFR BLD AUTO: 37.9 % (ref 34–46.6)
HGB BLD-MCNC: 13.1 G/DL (ref 12–15.9)
IMM GRANULOCYTES # BLD AUTO: 0.02 10*3/MM3 (ref 0–0.05)
IMM GRANULOCYTES NFR BLD AUTO: 0.3 % (ref 0–0.5)
LIPASE SERPL-CCNC: 21 U/L (ref 13–60)
LYMPHOCYTES # BLD AUTO: 1.82 10*3/MM3 (ref 0.7–3.1)
LYMPHOCYTES NFR BLD AUTO: 31.4 % (ref 19.6–45.3)
MCH RBC QN AUTO: 31 PG (ref 26.6–33)
MCHC RBC AUTO-ENTMCNC: 34.6 G/DL (ref 31.5–35.7)
MCV RBC AUTO: 89.6 FL (ref 79–97)
MONOCYTES # BLD AUTO: 0.65 10*3/MM3 (ref 0.1–0.9)
MONOCYTES NFR BLD AUTO: 11.2 % (ref 5–12)
NEUTROPHILS NFR BLD AUTO: 3.19 10*3/MM3 (ref 1.7–7)
NEUTROPHILS NFR BLD AUTO: 55.2 % (ref 42.7–76)
NRBC BLD AUTO-RTO: 0 /100 WBC (ref 0–0.2)
PLATELET # BLD AUTO: 210 10*3/MM3 (ref 140–450)
PMV BLD AUTO: 10.3 FL (ref 6–12)
POTASSIUM SERPL-SCNC: 3.3 MMOL/L (ref 3.5–5.2)
PROT SERPL-MCNC: 6.7 G/DL (ref 6–8.5)
RBC # BLD AUTO: 4.23 10*6/MM3 (ref 3.77–5.28)
SODIUM SERPL-SCNC: 136 MMOL/L (ref 136–145)
WBC NRBC COR # BLD AUTO: 5.79 10*3/MM3 (ref 3.4–10.8)

## 2025-06-18 PROCEDURE — 99285 EMERGENCY DEPT VISIT HI MDM: CPT

## 2025-06-18 PROCEDURE — 25010000002 MORPHINE PER 10 MG: Performed by: EMERGENCY MEDICINE

## 2025-06-18 PROCEDURE — 25510000001 IOPAMIDOL 61 % SOLUTION: Performed by: EMERGENCY MEDICINE

## 2025-06-18 PROCEDURE — G0378 HOSPITAL OBSERVATION PER HR: HCPCS

## 2025-06-18 PROCEDURE — 74177 CT ABD & PELVIS W/CONTRAST: CPT

## 2025-06-18 PROCEDURE — 25010000002 ONDANSETRON PER 1 MG: Performed by: EMERGENCY MEDICINE

## 2025-06-18 PROCEDURE — 96374 THER/PROPH/DIAG INJ IV PUSH: CPT

## 2025-06-18 PROCEDURE — 96375 TX/PRO/DX INJ NEW DRUG ADDON: CPT

## 2025-06-18 PROCEDURE — 83690 ASSAY OF LIPASE: CPT | Performed by: EMERGENCY MEDICINE

## 2025-06-18 PROCEDURE — 85025 COMPLETE CBC W/AUTO DIFF WBC: CPT | Performed by: EMERGENCY MEDICINE

## 2025-06-18 PROCEDURE — 80053 COMPREHEN METABOLIC PANEL: CPT | Performed by: EMERGENCY MEDICINE

## 2025-06-18 RX ORDER — ONDANSETRON 2 MG/ML
4 INJECTION INTRAMUSCULAR; INTRAVENOUS EVERY 6 HOURS PRN
Status: DISCONTINUED | OUTPATIENT
Start: 2025-06-18 | End: 2025-06-19

## 2025-06-18 RX ORDER — SODIUM CHLORIDE 9 MG/ML
40 INJECTION, SOLUTION INTRAVENOUS AS NEEDED
Status: DISCONTINUED | OUTPATIENT
Start: 2025-06-18 | End: 2025-06-19

## 2025-06-18 RX ORDER — DICYCLOMINE HCL 20 MG
20 TABLET ORAL AS NEEDED
COMMUNITY
Start: 2025-05-26

## 2025-06-18 RX ORDER — SODIUM CHLORIDE 9 MG/ML
75 INJECTION, SOLUTION INTRAVENOUS CONTINUOUS
Status: DISCONTINUED | OUTPATIENT
Start: 2025-06-18 | End: 2025-06-19

## 2025-06-18 RX ORDER — SODIUM CHLORIDE 0.9 % (FLUSH) 0.9 %
10 SYRINGE (ML) INJECTION AS NEEDED
Status: DISCONTINUED | OUTPATIENT
Start: 2025-06-18 | End: 2025-06-19

## 2025-06-18 RX ORDER — AMLODIPINE BESYLATE 5 MG/1
5 TABLET ORAL DAILY
COMMUNITY
Start: 2025-05-14 | End: 2026-05-14

## 2025-06-18 RX ORDER — NICOTINE 21 MG/24HR
1 PATCH, TRANSDERMAL 24 HOURS TRANSDERMAL
Status: DISCONTINUED | OUTPATIENT
Start: 2025-06-19 | End: 2025-06-21 | Stop reason: HOSPADM

## 2025-06-18 RX ORDER — LISINOPRIL 40 MG/1
40 TABLET ORAL NIGHTLY
Status: DISCONTINUED | OUTPATIENT
Start: 2025-06-19 | End: 2025-06-21 | Stop reason: HOSPADM

## 2025-06-18 RX ORDER — MORPHINE SULFATE 2 MG/ML
4 INJECTION, SOLUTION INTRAMUSCULAR; INTRAVENOUS ONCE
Status: COMPLETED | OUTPATIENT
Start: 2025-06-18 | End: 2025-06-18

## 2025-06-18 RX ORDER — ONDANSETRON 2 MG/ML
4 INJECTION INTRAMUSCULAR; INTRAVENOUS ONCE
Status: COMPLETED | OUTPATIENT
Start: 2025-06-18 | End: 2025-06-18

## 2025-06-18 RX ORDER — LISINOPRIL 40 MG/1
40 TABLET ORAL NIGHTLY
COMMUNITY

## 2025-06-18 RX ORDER — TRIAMCINOLONE ACETONIDE 1 MG/G
OINTMENT TOPICAL 2 TIMES DAILY
COMMUNITY
Start: 2025-05-14

## 2025-06-18 RX ORDER — AMLODIPINE BESYLATE 5 MG/1
5 TABLET ORAL DAILY
Status: DISCONTINUED | OUTPATIENT
Start: 2025-06-19 | End: 2025-06-21 | Stop reason: HOSPADM

## 2025-06-18 RX ORDER — SODIUM CHLORIDE 0.9 % (FLUSH) 0.9 %
10 SYRINGE (ML) INJECTION EVERY 12 HOURS SCHEDULED
Status: DISCONTINUED | OUTPATIENT
Start: 2025-06-18 | End: 2025-06-19

## 2025-06-18 RX ORDER — ONDANSETRON 4 MG/1
4 TABLET, ORALLY DISINTEGRATING ORAL EVERY 6 HOURS PRN
Status: DISCONTINUED | OUTPATIENT
Start: 2025-06-18 | End: 2025-06-19

## 2025-06-18 RX ORDER — IOPAMIDOL 612 MG/ML
85 INJECTION, SOLUTION INTRAVASCULAR
Status: COMPLETED | OUTPATIENT
Start: 2025-06-18 | End: 2025-06-18

## 2025-06-18 RX ADMIN — IOPAMIDOL 85 ML: 612 INJECTION, SOLUTION INTRAVENOUS at 20:19

## 2025-06-18 RX ADMIN — MORPHINE SULFATE 4 MG: 2 INJECTION, SOLUTION INTRAMUSCULAR; INTRAVENOUS at 21:34

## 2025-06-18 RX ADMIN — ONDANSETRON 4 MG: 2 INJECTION, SOLUTION INTRAMUSCULAR; INTRAVENOUS at 21:34

## 2025-06-18 NOTE — LETTER
To whom it may concern,      Mariana Loya was hospitalized at [facility] on 6/18/25 and discharged on 6/20/25. Mariana may return to work in two weeks.       Thank you,     [facility]na [facility]nelas [facility]

## 2025-06-18 NOTE — ED PROVIDER NOTES
EMERGENCY DEPARTMENT ENCOUNTER    Room Number:  111/1  PCP: Brigitte Alvarado APRN  Historian: Patient      HPI:  Chief Complaint: Abdominal pain  A complete HPI/ROS/PMH/PSH/SH/FH are unobtainable due to: None  Context: Mariana Loya is a 65 y.o. female who presents to the ED c/o abdominal pain.  Patient with history of cholecystectomy appendectomy hernia repair presents for evaluation of abdominal pain.  Patient states she has been having abdominal pain for several weeks now worse over the last few days.  Is had nausea without vomiting.  Patient is told she has hernia.  Was told that she cannot have surgery until she stopped smoking.  Patient has had no fevers or chills.  States she would like another surgeon to see her            PAST MEDICAL HISTORY  Active Ambulatory Problems     Diagnosis Date Noted    Hyperlipidemia 09/19/2016    Tobacco abuse 09/19/2016    Incisional hernia, without obstruction or gangrene 05/01/2017    MRSA exposure 05/01/2017    Superficial thrombophlebitis 10/01/2018    Declined smoking cessation 10/01/2018    History of colon polyps 03/08/2019    Pneumonia of both lungs due to infectious organism, unspecified part of lung 11/24/2022    Acute pulmonary embolism 11/24/2022    Hypertension     History of DVT (deep vein thrombosis) 11/25/2022    COPD (chronic obstructive pulmonary disease) 11/25/2022    Hyperlipidemia 11/25/2022     Resolved Ambulatory Problems     Diagnosis Date Noted    Incarcerated hernia 09/08/2018    Elevated liver function tests 11/25/2022    Hyperglycemia 11/25/2022     Past Medical History:   Diagnosis Date    Colon polyps 10/07/2013    Diverticulitis of colon     Diverticulosis 10/07/2013    DVT (deep venous thrombosis)     Superficial thrombosis of leg, left          PAST SURGICAL HISTORY  Past Surgical History:   Procedure Laterality Date    CHOLECYSTECTOMY      COLONOSCOPY      Dr. Anne    COLONOSCOPY N/A 04/15/2019    Procedure: COLONOSCOPY into cecum and  TI with hot snare polypectomy with 10  cc saline lift;  Surgeon: Eddi Anne MD;  Location: [facility] ENDOSCOPY;  Service: Gastroenterology    COLONOSCOPY W/ POLYPECTOMY N/A 10/07/2013    One 10 mm polyp in the proximal ascending colon: resected and retrieved, diverticulosis in the sigmoid colon and in the descending colon-Dr. Eddi Anne     EYE SURGERY  Renal tear laser surgery    HERNIA REPAIR      KNEE ARTHROSCOPY W/ MENISCECTOMY Left 11/17/2006    Left knee arthroscopy with complete lateral meniscectomy-Dr. Ming Thorpe    LAPAROSCOPIC CHOLECYSTECTOMY W/ CHOLANGIOGRAPHY N/A 11/14/2001    Dr. Lucy Levi    TONSILLECTOMY AND ADENOIDECTOMY N/A     VENTRAL/INCISIONAL HERNIA REPAIR N/A 09/08/2018    Procedure: incarcerated /INCISIONAL HERNIA REPAIR with mesh;  Surgeon: Kody Diaz MD;  Location: [facility] MAIN OR;  Service: General    WISDOM TOOTH EXTRACTION N/A 1980    Dr. Wong         FAMILY HISTORY  Family History   Problem Relation Age of Onset    Diabetes Mother     Heart disease Mother         chf    Arthritis Mother     Hypertension Mother     Heart disease Father     Early death Father         Heart Attack    No Known Problems Sister     No Known Problems Brother     No Known Problems Daughter     No Known Problems Son     No Known Problems Maternal Grandmother     No Known Problems Paternal Grandmother     No Known Problems Maternal Aunt     No Known Problems Paternal Aunt     BRCA 1/2 Neg Hx     Breast cancer Neg Hx     Colon cancer Neg Hx     Endometrial cancer Neg Hx     Ovarian cancer Neg Hx          SOCIAL HISTORY  Social History     Socioeconomic History    Marital status:      Spouse name: Orlando   Tobacco Use    Smoking status: Every Day     Current packs/day: 1.00     Average packs/day: 1 pack/day for 50.0 years (50.0 ttl pk-yrs)     Types: Cigarettes    Smokeless tobacco: Never   Vaping Use    Vaping status: Never Used   Substance and Sexual Activity    Alcohol use: No     Drug use: No    Sexual activity: Defer         ALLERGIES  Statins        REVIEW OF SYSTEMS  Review of Systems   Abdominal pain      PHYSICAL EXAM  ED Triage Vitals   Temp Heart Rate Resp BP SpO2   06/18/25 1852 06/18/25 1852 06/18/25 1852 06/18/25 1853 06/18/25 1852   99.3 °F (37.4 °C) 118 16 (!) 183/93 93 %      Temp src Heart Rate Source Patient Position BP Location FiO2 (%)   06/18/25 1852 06/18/25 1852 06/18/25 1853 -- --   Oral Monitor Sitting         Physical Exam      GENERAL: no acute distress  HENT: nares patent  EYES: no scleral icterus  CV: regular rhythm, normal rate  RESPIRATORY: normal effort  ABDOMEN: soft.  Mild tenderness epigastric and lower abdomen  MUSCULOSKELETAL: no deformity  NEURO: alert, moves all extremities, follows commands  PSYCH:  calm, cooperative  SKIN: warm, dry    Vital signs and nursing notes reviewed.          LAB RESULTS  Recent Results (from the past 24 hours)   Comprehensive Metabolic Panel    Collection Time: 06/18/25  7:20 PM    Specimen: Blood   Result Value Ref Range    Glucose 142 (H) 65 - 99 mg/dL    BUN 7.0 (L) 8.0 - 23.0 mg/dL    Creatinine 0.60 0.57 - 1.00 mg/dL    Sodium 136 136 - 145 mmol/L    Potassium 3.3 (L) 3.5 - 5.2 mmol/L    Chloride 102 98 - 107 mmol/L    CO2 23.0 22.0 - 29.0 mmol/L    Calcium 8.8 8.6 - 10.5 mg/dL    Total Protein 6.7 6.0 - 8.5 g/dL    Albumin 3.4 (L) 3.5 - 5.2 g/dL    ALT (SGPT) 12 1 - 33 U/L    AST (SGOT) 13 1 - 32 U/L    Alkaline Phosphatase 87 39 - 117 U/L    Total Bilirubin 0.5 0.0 - 1.2 mg/dL    Globulin 3.3 gm/dL    A/G Ratio 1.0 g/dL    BUN/Creatinine Ratio 11.7 7.0 - 25.0    Anion Gap 11.0 5.0 - 15.0 mmol/L    eGFR 99.1 >60.0 mL/min/1.73   Lipase    Collection Time: 06/18/25  7:20 PM    Specimen: Blood   Result Value Ref Range    Lipase 21 13 - 60 U/L   CBC Auto Differential    Collection Time: 06/18/25  7:20 PM    Specimen: Blood   Result Value Ref Range    WBC 5.79 3.40 - 10.80 10*3/mm3    RBC 4.23 3.77 - 5.28 10*6/mm3     Hemoglobin 13.1 12.0 - 15.9 g/dL    Hematocrit 37.9 34.0 - 46.6 %    MCV 89.6 79.0 - 97.0 fL    MCH 31.0 26.6 - 33.0 pg    MCHC 34.6 31.5 - 35.7 g/dL    RDW 12.5 12.3 - 15.4 %    RDW-SD 39.9 37.0 - 54.0 fl    MPV 10.3 6.0 - 12.0 fL    Platelets 210 140 - 450 10*3/mm3    Neutrophil % 55.2 42.7 - 76.0 %    Lymphocyte % 31.4 19.6 - 45.3 %    Monocyte % 11.2 5.0 - 12.0 %    Eosinophil % 1.6 0.3 - 6.2 %    Basophil % 0.3 0.0 - 1.5 %    Immature Grans % 0.3 0.0 - 0.5 %    Neutrophils, Absolute 3.19 1.70 - 7.00 10*3/mm3    Lymphocytes, Absolute 1.82 0.70 - 3.10 10*3/mm3    Monocytes, Absolute 0.65 0.10 - 0.90 10*3/mm3    Eosinophils, Absolute 0.09 0.00 - 0.40 10*3/mm3    Basophils, Absolute 0.02 0.00 - 0.20 10*3/mm3    Immature Grans, Absolute 0.02 0.00 - 0.05 10*3/mm3    nRBC 0.0 0.0 - 0.2 /100 WBC   CBC (No Diff)    Collection Time: 06/19/25  3:54 AM    Specimen: Blood   Result Value Ref Range    WBC 5.10 3.40 - 10.80 10*3/mm3    RBC 3.98 3.77 - 5.28 10*6/mm3    Hemoglobin 12.1 12.0 - 15.9 g/dL    Hematocrit 36.1 34.0 - 46.6 %    MCV 90.7 79.0 - 97.0 fL    MCH 30.4 26.6 - 33.0 pg    MCHC 33.5 31.5 - 35.7 g/dL    RDW 12.3 12.3 - 15.4 %    RDW-SD 40.6 37.0 - 54.0 fl    MPV 10.8 6.0 - 12.0 fL    Platelets 203 140 - 450 10*3/mm3   Basic Metabolic Panel    Collection Time: 06/19/25  3:54 AM    Specimen: Blood   Result Value Ref Range    Glucose 136 (H) 65 - 99 mg/dL    BUN 8.0 8.0 - 23.0 mg/dL    Creatinine 0.62 0.57 - 1.00 mg/dL    Sodium 136 136 - 145 mmol/L    Potassium 3.5 3.5 - 5.2 mmol/L    Chloride 104 98 - 107 mmol/L    CO2 22.0 22.0 - 29.0 mmol/L    Calcium 8.5 (L) 8.6 - 10.5 mg/dL    BUN/Creatinine Ratio 12.9 7.0 - 25.0    Anion Gap 10.0 5.0 - 15.0 mmol/L    eGFR 98.4 >60.0 mL/min/1.73       Ordered the above labs and reviewed the results.        RADIOLOGY  CT Abdomen Pelvis With Contrast  Result Date: 6/18/2025  CT ABDOMEN AND PELVIS WITH IV CONTRAST  HISTORY: Abdominal pain  TECHNIQUE: Radiation dose reduction  techniques were utilized, including automated exposure control and exposure modulation based on body size. Axial images were obtained through the abdomen and pelvis after the administration of IV contrast. Coronal and sagittal reformatted images obtained.  COMPARISON: 9/8/2018  FINDINGS:  ABDOMEN: Mild atelectasis/scarring in the lung bases. There is a small hiatal hernia. There is a stable small hemangioma in the left lobe of the liver. Cholecystectomy. The spleen is normal in size. The right kidney is small with respect to the left kidney. The kidneys are otherwise unremarkable. The adrenal glands are unremarkable. The pancreas is unremarkable. There is a small fat-containing upper abdominal wall hernia again demonstrated. There is a midline hernia present located just below the umbilicus. There is loop of small bowel contained within the hernia sac and there is fluid in the hernia sac surrounding the small bowel and some thickening of the bowel loop within the hernia sac. The small bowel loop proximal to the hernia sac is dilated and fluid-filled. Overall the CT appearance is concerning for possible incarcerated ventral hernia with at least partial obstruction. Correlate with physical exam findings to see if the hernia is reducible.  PELVIS: The bladder is unremarkable. There is a right adnexal cyst again demonstrated measuring about 2.7 cm not significantly changed. There is no free fluid in the pelvis. Colonic diverticulosis.      1. There is a midline hernia present just below the umbilicus. There is a loop of small bowel within the hernia sac with surrounding fluid in the hernia sac and some thickening of the bowel loop. The small bowel loop proximal to the hernia sac is dilated and fluid-filled. Overall the CT appearance is concerning for possible incarcerated ventral hernia with a least partial obstruction. Correlate with physical exam findings to see if the hernia is reducible. Surgical consultation may be  needed 2. Additional findings as described  Radiation dose reduction techniques were utilized, including automated exposure control and exposure modulation based on body size.   This report was finalized on 6/18/2025 8:42 PM by Dr. Eddi Barrientos M.D on Workstation: [id number]        Ordered the above noted radiological studies.           PROCEDURES  Procedures            MEDICATIONS GIVEN IN ER  Medications   sodium chloride 0.9 % flush 10 mL (has no administration in time range)   sodium chloride 0.9 % flush 10 mL (10 mL Intravenous Given 6/19/25 0046)   sodium chloride 0.9 % flush 10 mL (has no administration in time range)   sodium chloride 0.9 % infusion 40 mL (has no administration in time range)   ondansetron ODT (ZOFRAN-ODT) disintegrating tablet 4 mg (has no administration in time range)     Or   ondansetron (ZOFRAN) injection 4 mg (has no administration in time range)   sodium chloride 0.9 % infusion (75 mL/hr Intravenous New Bag 6/19/25 0046)   amLODIPine (NORVASC) tablet 5 mg (has no administration in time range)   lisinopril (PRINIVIL,ZESTRIL) tablet 40 mg (40 mg Oral Given 6/19/25 0044)   revefenacin (YUPELRI) nebulizer solution 175 mcg (175 mcg Nebulization Not Given 6/19/25 0645)   nicotine (NICODERM CQ) 21 MG/24HR patch 1 patch (has no administration in time range)   traMADol (ULTRAM) tablet 50 mg (50 mg Oral Given 6/19/25 0044)   iopamidol (ISOVUE-300) 61 % injection 85 mL (85 mL Intravenous Given 6/18/25 2019)   morphine injection 4 mg (4 mg Intravenous Given 6/18/25 2134)   ondansetron (ZOFRAN) injection 4 mg (4 mg Intravenous Given 6/18/25 2134)                   MEDICAL DECISION MAKING, PROGRESS, and CONSULTS    All labs have been independently reviewed by me.  All radiology studies have been reviewed by me and I have also reviewed the radiology report.   EKGs independently viewed and interpreted by me.  Discussion below represents my analysis of pertinent findings related to patient's  condition, differential diagnosis, treatment plan and final disposition.      Additional sources:  - Discussed/ obtained information from independent historians: None    - External (non-ED) record review: Epic reviewed patient seen 6/16/2025 by primary doctor for abdominal hernia    - Chronic or social conditions impacting care: None    - Shared decision making: None      Orders placed during this visit:  Orders Placed This Encounter   Procedures    CT Abdomen Pelvis With Contrast    Comprehensive Metabolic Panel    Lipase    CBC Auto Differential    CBC (No Diff)    Basic Metabolic Panel    NPO Diet NPO Type: Sips with Meds    Intake & Output    Weigh Patient    Oral Care    Saline Lock & Maintain IV Access    Place Sequential Compression Device    Maintain Sequential Compression Device    Vital Signs    Continuous Pulse Oximetry    Up With Assistance    Verify / Perform Chlorhexidine Skin Prep    Code Status and Medical Interventions: CPR (Attempt to Resuscitate); Full Support    Surgery (on-call MD unless specified)    Inpatient General Surgery Consult    Insert Peripheral IV    Insert Peripheral IV    Initiate Emergency Department Observation Status    CBC & Differential         Additional orders considered but not ordered:  None        Differential diagnosis includes but is not limited to:    Hernia versus obstruction versus abscess      Independent interpretation of labs, radiology studies, and discussions with consultants:  ED Course as of 06/19/25 0653   Wed Jun 18, 2025 1938 .  Patient's lab work pending.  CT scan pending.  [facility]   2006 I discussed the case with Dr. Sandhu and joined the patient's care team at this time pending CT and dispo   [CC]   2030 CT Abdomen Pelvis With Contrast  My independent interpretation of the CT of the abdomen pelvis is no bowel obstruction, multiple incisional hernias [CC]   2134 CT revealed what appeared to be a potential  incarcerated hernia with a possible partial bowel obstruction.  The hernia is palpable but easily reducible it is soft and not particularly tender to palpation.  Patient does report some pain to the area.  She is agreeable to admission for observation and surgery consultation. [CC]   2154 I rechecked the patient.  I discussed the patient's labs, radiology findings (including all incidental findings), diagnosis, and plan for admission. The patient understands and agrees with the plan. [CC]   2154 Spoke with Dr. Healy with general surgery.  She reviewed the patient's chart and agrees with plan for observation.  They will see in the morning. [CC]   2156 Spoke with JAVIER Copeland with Observation Unit.  Reviewed history, exam, results, treatments.  She agrees admit the patient to Dr. Barajas.   [CC]      ED Course User Index  [CC] Carlene Cahloun, PA-C  [SL] Gianluca Sandhu MD                 DIAGNOSIS  Final diagnoses:   Incarcerated ventral hernia         DISPOSITION  admit            Latest Documented Vital Signs:  As of 06:53 EDT  BP- 118/66 HR- 77 Temp- 98.1 °F (36.7 °C) (Oral) O2 sat- 91%              --    Please note that portions of this were completed with a voice recognition program.       Note Disclaimer: At [facility], we believe that sharing information builds trust and better relationships. You are receiving this note because you are receiving care at [facility] or recently visited. It is possible you will see health information before a provider has talked with you about it. This kind of information can be easy to misunderstand. To help you fully understand what it means for your health, we urge you to discuss this note with your provider.            Gianluca Sandhu MD  06/19/25 6190

## 2025-06-18 NOTE — ED NOTES
Pt states that she has a hernia that has had increased pain today. Reports increased N/V d/t pain.

## 2025-06-19 ENCOUNTER — ANESTHESIA EVENT (OUTPATIENT)
Dept: PERIOP | Facility: HOSPITAL | Age: 65
End: 2025-06-19
Payer: MEDICARE

## 2025-06-19 ENCOUNTER — ANESTHESIA (OUTPATIENT)
Dept: PERIOP | Facility: HOSPITAL | Age: 65
End: 2025-06-19
Payer: MEDICARE

## 2025-06-19 PROBLEM — K43.6 INCARCERATED VENTRAL HERNIA: Status: ACTIVE | Noted: 2025-06-18

## 2025-06-19 LAB
ANION GAP SERPL CALCULATED.3IONS-SCNC: 10 MMOL/L (ref 5–15)
BUN SERPL-MCNC: 8 MG/DL (ref 8–23)
BUN/CREAT SERPL: 12.9 (ref 7–25)
CALCIUM SPEC-SCNC: 8.5 MG/DL (ref 8.6–10.5)
CHLORIDE SERPL-SCNC: 104 MMOL/L (ref 98–107)
CO2 SERPL-SCNC: 22 MMOL/L (ref 22–29)
CREAT SERPL-MCNC: 0.62 MG/DL (ref 0.57–1)
DEPRECATED RDW RBC AUTO: 40.6 FL (ref 37–54)
EGFRCR SERPLBLD CKD-EPI 2021: 98.4 ML/MIN/1.73
ERYTHROCYTE [DISTWIDTH] IN BLOOD BY AUTOMATED COUNT: 12.3 % (ref 12.3–15.4)
GLUCOSE SERPL-MCNC: 136 MG/DL (ref 65–99)
HCT VFR BLD AUTO: 36.1 % (ref 34–46.6)
HGB BLD-MCNC: 12.1 G/DL (ref 12–15.9)
MCH RBC QN AUTO: 30.4 PG (ref 26.6–33)
MCHC RBC AUTO-ENTMCNC: 33.5 G/DL (ref 31.5–35.7)
MCV RBC AUTO: 90.7 FL (ref 79–97)
PLATELET # BLD AUTO: 203 10*3/MM3 (ref 140–450)
PMV BLD AUTO: 10.8 FL (ref 6–12)
POTASSIUM SERPL-SCNC: 3.5 MMOL/L (ref 3.5–5.2)
RBC # BLD AUTO: 3.98 10*6/MM3 (ref 3.77–5.28)
SODIUM SERPL-SCNC: 136 MMOL/L (ref 136–145)
WBC NRBC COR # BLD AUTO: 5.1 10*3/MM3 (ref 3.4–10.8)

## 2025-06-19 PROCEDURE — 25010000002 PROPOFOL 10 MG/ML EMULSION: Performed by: NURSE ANESTHETIST, CERTIFIED REGISTERED

## 2025-06-19 PROCEDURE — 25010000002 SUGAMMADEX 200 MG/2ML SOLUTION: Performed by: ANESTHESIOLOGY

## 2025-06-19 PROCEDURE — 49593 RPR AA HRN 1ST 3-10 RDC: CPT | Performed by: SURGERY

## 2025-06-19 PROCEDURE — 25010000002 PHENYLEPHRINE 10 MG/ML SOLUTION: Performed by: NURSE ANESTHETIST, CERTIFIED REGISTERED

## 2025-06-19 PROCEDURE — 25010000002 CEFAZOLIN PER 500 MG: Performed by: SURGERY

## 2025-06-19 PROCEDURE — 96361 HYDRATE IV INFUSION ADD-ON: CPT

## 2025-06-19 PROCEDURE — 99222 1ST HOSP IP/OBS MODERATE 55: CPT | Performed by: SURGERY

## 2025-06-19 PROCEDURE — 80048 BASIC METABOLIC PNL TOTAL CA: CPT

## 2025-06-19 PROCEDURE — 25010000002 LIDOCAINE 2% SOLUTION: Performed by: NURSE ANESTHETIST, CERTIFIED REGISTERED

## 2025-06-19 PROCEDURE — 25010000002 DEXAMETHASONE SODIUM PHOSPHATE 20 MG/5ML SOLUTION: Performed by: NURSE ANESTHETIST, CERTIFIED REGISTERED

## 2025-06-19 PROCEDURE — 25010000002 FENTANYL CITRATE (PF) 50 MCG/ML SOLUTION: Performed by: NURSE ANESTHETIST, CERTIFIED REGISTERED

## 2025-06-19 PROCEDURE — 85027 COMPLETE CBC AUTOMATED: CPT

## 2025-06-19 PROCEDURE — 49593 RPR AA HRN 1ST 3-10 RDC: CPT | Performed by: SPECIALIST/TECHNOLOGIST, OTHER

## 2025-06-19 PROCEDURE — C1781 MESH (IMPLANTABLE): HCPCS | Performed by: SURGERY

## 2025-06-19 PROCEDURE — 25010000002 KETOROLAC TROMETHAMINE PER 15 MG: Performed by: ANESTHESIOLOGY

## 2025-06-19 PROCEDURE — G0378 HOSPITAL OBSERVATION PER HR: HCPCS

## 2025-06-19 PROCEDURE — 25010000002 VASOPRESSIN 20 UNIT/ML SOLUTION: Performed by: ANESTHESIOLOGY

## 2025-06-19 PROCEDURE — 25810000003 SODIUM CHLORIDE 0.9 % SOLUTION

## 2025-06-19 PROCEDURE — C1765 ADHESION BARRIER: HCPCS | Performed by: SURGERY

## 2025-06-19 PROCEDURE — 25010000002 ONDANSETRON PER 1 MG

## 2025-06-19 PROCEDURE — 25810000003 LACTATED RINGERS PER 1000 ML: Performed by: STUDENT IN AN ORGANIZED HEALTH CARE EDUCATION/TRAINING PROGRAM

## 2025-06-19 DEVICE — VENTRALEX ST HERNIA PATCH, 8.0 CM (3.2"), CIRCLE
Type: IMPLANTABLE DEVICE | Site: ABDOMEN | Status: FUNCTIONAL
Brand: VENTRALEX

## 2025-06-19 DEVICE — CLIP LIGAT VASC HORIZON TI LG ORNG 6CT: Type: IMPLANTABLE DEVICE | Site: ABDOMEN | Status: FUNCTIONAL

## 2025-06-19 RX ORDER — NALOXONE HCL 0.4 MG/ML
0.2 VIAL (ML) INJECTION AS NEEDED
Status: DISCONTINUED | OUTPATIENT
Start: 2025-06-19 | End: 2025-06-19 | Stop reason: HOSPADM

## 2025-06-19 RX ORDER — VASOPRESSIN 20 [USP'U]/ML
INJECTION, SOLUTION INTRAVENOUS AS NEEDED
Status: DISCONTINUED | OUTPATIENT
Start: 2025-06-19 | End: 2025-06-19 | Stop reason: SURG

## 2025-06-19 RX ORDER — TRAMADOL HYDROCHLORIDE 50 MG/1
50 TABLET ORAL EVERY 6 HOURS PRN
Status: DISCONTINUED | OUTPATIENT
Start: 2025-06-19 | End: 2025-06-21 | Stop reason: HOSPADM

## 2025-06-19 RX ORDER — ATROPINE SULFATE 0.4 MG/ML
0.4 INJECTION, SOLUTION INTRAMUSCULAR; INTRAVENOUS; SUBCUTANEOUS ONCE AS NEEDED
Status: DISCONTINUED | OUTPATIENT
Start: 2025-06-19 | End: 2025-06-19 | Stop reason: HOSPADM

## 2025-06-19 RX ORDER — DROPERIDOL 2.5 MG/ML
0.62 INJECTION, SOLUTION INTRAMUSCULAR; INTRAVENOUS
Status: DISCONTINUED | OUTPATIENT
Start: 2025-06-19 | End: 2025-06-19 | Stop reason: HOSPADM

## 2025-06-19 RX ORDER — PROPOFOL 10 MG/ML
VIAL (ML) INTRAVENOUS AS NEEDED
Status: DISCONTINUED | OUTPATIENT
Start: 2025-06-19 | End: 2025-06-19 | Stop reason: SURG

## 2025-06-19 RX ORDER — PHENYLEPHRINE HYDROCHLORIDE 10 MG/ML
INJECTION INTRAVENOUS AS NEEDED
Status: DISCONTINUED | OUTPATIENT
Start: 2025-06-19 | End: 2025-06-19 | Stop reason: SURG

## 2025-06-19 RX ORDER — DIPHENHYDRAMINE HYDROCHLORIDE 50 MG/ML
12.5 INJECTION, SOLUTION INTRAMUSCULAR; INTRAVENOUS
Status: DISCONTINUED | OUTPATIENT
Start: 2025-06-19 | End: 2025-06-19 | Stop reason: HOSPADM

## 2025-06-19 RX ORDER — ONDANSETRON 4 MG/1
4 TABLET, ORALLY DISINTEGRATING ORAL EVERY 6 HOURS PRN
Status: DISCONTINUED | OUTPATIENT
Start: 2025-06-19 | End: 2025-06-21 | Stop reason: HOSPADM

## 2025-06-19 RX ORDER — ONDANSETRON 2 MG/ML
4 INJECTION INTRAMUSCULAR; INTRAVENOUS ONCE AS NEEDED
Status: DISCONTINUED | OUTPATIENT
Start: 2025-06-19 | End: 2025-06-19 | Stop reason: HOSPADM

## 2025-06-19 RX ORDER — PROMETHAZINE HYDROCHLORIDE 25 MG/1
25 TABLET ORAL ONCE AS NEEDED
Status: DISCONTINUED | OUTPATIENT
Start: 2025-06-19 | End: 2025-06-19 | Stop reason: HOSPADM

## 2025-06-19 RX ORDER — MIDAZOLAM HYDROCHLORIDE 1 MG/ML
0.5 INJECTION, SOLUTION INTRAMUSCULAR; INTRAVENOUS
Status: DISCONTINUED | OUTPATIENT
Start: 2025-06-19 | End: 2025-06-19 | Stop reason: HOSPADM

## 2025-06-19 RX ORDER — SODIUM CHLORIDE 0.9 % (FLUSH) 0.9 %
3-10 SYRINGE (ML) INJECTION AS NEEDED
Status: DISCONTINUED | OUTPATIENT
Start: 2025-06-19 | End: 2025-06-19 | Stop reason: HOSPADM

## 2025-06-19 RX ORDER — SODIUM CHLORIDE 0.9 % (FLUSH) 0.9 %
3 SYRINGE (ML) INJECTION EVERY 12 HOURS SCHEDULED
Status: DISCONTINUED | OUTPATIENT
Start: 2025-06-19 | End: 2025-06-19 | Stop reason: HOSPADM

## 2025-06-19 RX ORDER — DEXAMETHASONE SODIUM PHOSPHATE 4 MG/ML
INJECTION, SOLUTION INTRA-ARTICULAR; INTRALESIONAL; INTRAMUSCULAR; INTRAVENOUS; SOFT TISSUE AS NEEDED
Status: DISCONTINUED | OUTPATIENT
Start: 2025-06-19 | End: 2025-06-19 | Stop reason: SURG

## 2025-06-19 RX ORDER — BUPIVACAINE HYDROCHLORIDE AND EPINEPHRINE 5; 5 MG/ML; UG/ML
INJECTION, SOLUTION EPIDURAL; INTRACAUDAL; PERINEURAL AS NEEDED
Status: DISCONTINUED | OUTPATIENT
Start: 2025-06-19 | End: 2025-06-19 | Stop reason: HOSPADM

## 2025-06-19 RX ORDER — LIDOCAINE HYDROCHLORIDE 20 MG/ML
INJECTION, SOLUTION INFILTRATION; PERINEURAL AS NEEDED
Status: DISCONTINUED | OUTPATIENT
Start: 2025-06-19 | End: 2025-06-19 | Stop reason: SURG

## 2025-06-19 RX ORDER — GUAIFENESIN 200 MG/1
200 TABLET ORAL EVERY 4 HOURS PRN
Status: DISCONTINUED | OUTPATIENT
Start: 2025-06-19 | End: 2025-06-20

## 2025-06-19 RX ORDER — LABETALOL HYDROCHLORIDE 5 MG/ML
5 INJECTION, SOLUTION INTRAVENOUS
Status: DISCONTINUED | OUTPATIENT
Start: 2025-06-19 | End: 2025-06-19 | Stop reason: HOSPADM

## 2025-06-19 RX ORDER — HYDROCODONE BITARTRATE AND ACETAMINOPHEN 5; 325 MG/1; MG/1
1 TABLET ORAL ONCE AS NEEDED
Status: COMPLETED | OUTPATIENT
Start: 2025-06-19 | End: 2025-06-19

## 2025-06-19 RX ORDER — MAGNESIUM HYDROXIDE 1200 MG/15ML
LIQUID ORAL AS NEEDED
Status: DISCONTINUED | OUTPATIENT
Start: 2025-06-19 | End: 2025-06-19 | Stop reason: HOSPADM

## 2025-06-19 RX ORDER — KETOROLAC TROMETHAMINE 30 MG/ML
INJECTION, SOLUTION INTRAMUSCULAR; INTRAVENOUS AS NEEDED
Status: DISCONTINUED | OUTPATIENT
Start: 2025-06-19 | End: 2025-06-19

## 2025-06-19 RX ORDER — ONDANSETRON 2 MG/ML
4 INJECTION INTRAMUSCULAR; INTRAVENOUS EVERY 6 HOURS PRN
Status: DISCONTINUED | OUTPATIENT
Start: 2025-06-19 | End: 2025-06-21 | Stop reason: HOSPADM

## 2025-06-19 RX ORDER — FENTANYL CITRATE 50 UG/ML
INJECTION, SOLUTION INTRAMUSCULAR; INTRAVENOUS AS NEEDED
Status: DISCONTINUED | OUTPATIENT
Start: 2025-06-19 | End: 2025-06-19 | Stop reason: SURG

## 2025-06-19 RX ORDER — ROCURONIUM BROMIDE 10 MG/ML
INJECTION, SOLUTION INTRAVENOUS AS NEEDED
Status: DISCONTINUED | OUTPATIENT
Start: 2025-06-19 | End: 2025-06-19 | Stop reason: SURG

## 2025-06-19 RX ORDER — FLUMAZENIL 0.1 MG/ML
0.2 INJECTION INTRAVENOUS AS NEEDED
Status: DISCONTINUED | OUTPATIENT
Start: 2025-06-19 | End: 2025-06-19 | Stop reason: HOSPADM

## 2025-06-19 RX ORDER — OXYCODONE AND ACETAMINOPHEN 5; 325 MG/1; MG/1
1 TABLET ORAL EVERY 4 HOURS PRN
Status: DISCONTINUED | OUTPATIENT
Start: 2025-06-19 | End: 2025-06-19

## 2025-06-19 RX ORDER — TRAMADOL HYDROCHLORIDE 50 MG/1
50 TABLET ORAL EVERY 6 HOURS PRN
Status: DISCONTINUED | OUTPATIENT
Start: 2025-06-19 | End: 2025-06-19

## 2025-06-19 RX ORDER — NALOXONE HCL 0.4 MG/ML
0.1 VIAL (ML) INJECTION
Status: DISCONTINUED | OUTPATIENT
Start: 2025-06-19 | End: 2025-06-21 | Stop reason: HOSPADM

## 2025-06-19 RX ORDER — SODIUM CHLORIDE, SODIUM LACTATE, POTASSIUM CHLORIDE, CALCIUM CHLORIDE 600; 310; 30; 20 MG/100ML; MG/100ML; MG/100ML; MG/100ML
9 INJECTION, SOLUTION INTRAVENOUS CONTINUOUS
Status: DISCONTINUED | OUTPATIENT
Start: 2025-06-19 | End: 2025-06-19

## 2025-06-19 RX ORDER — EPHEDRINE SULFATE 50 MG/ML
5 INJECTION, SOLUTION INTRAVENOUS ONCE AS NEEDED
Status: DISCONTINUED | OUTPATIENT
Start: 2025-06-19 | End: 2025-06-19 | Stop reason: HOSPADM

## 2025-06-19 RX ORDER — HYDROMORPHONE HYDROCHLORIDE 1 MG/ML
0.5 INJECTION, SOLUTION INTRAMUSCULAR; INTRAVENOUS; SUBCUTANEOUS
Status: DISCONTINUED | OUTPATIENT
Start: 2025-06-19 | End: 2025-06-21 | Stop reason: HOSPADM

## 2025-06-19 RX ORDER — FENTANYL CITRATE 50 UG/ML
50 INJECTION, SOLUTION INTRAMUSCULAR; INTRAVENOUS
Status: DISCONTINUED | OUTPATIENT
Start: 2025-06-19 | End: 2025-06-19 | Stop reason: HOSPADM

## 2025-06-19 RX ORDER — HYDROMORPHONE HYDROCHLORIDE 1 MG/ML
0.5 INJECTION, SOLUTION INTRAMUSCULAR; INTRAVENOUS; SUBCUTANEOUS
Status: DISCONTINUED | OUTPATIENT
Start: 2025-06-19 | End: 2025-06-19 | Stop reason: HOSPADM

## 2025-06-19 RX ORDER — IPRATROPIUM BROMIDE AND ALBUTEROL SULFATE 2.5; .5 MG/3ML; MG/3ML
3 SOLUTION RESPIRATORY (INHALATION) ONCE AS NEEDED
Status: COMPLETED | OUTPATIENT
Start: 2025-06-19 | End: 2025-06-19

## 2025-06-19 RX ORDER — KETOROLAC TROMETHAMINE 30 MG/ML
INJECTION, SOLUTION INTRAMUSCULAR; INTRAVENOUS AS NEEDED
Status: DISCONTINUED | OUTPATIENT
Start: 2025-06-19 | End: 2025-06-19 | Stop reason: SURG

## 2025-06-19 RX ORDER — HYDRALAZINE HYDROCHLORIDE 20 MG/ML
5 INJECTION INTRAMUSCULAR; INTRAVENOUS
Status: DISCONTINUED | OUTPATIENT
Start: 2025-06-19 | End: 2025-06-19 | Stop reason: HOSPADM

## 2025-06-19 RX ORDER — PROMETHAZINE HYDROCHLORIDE 25 MG/1
25 SUPPOSITORY RECTAL ONCE AS NEEDED
Status: DISCONTINUED | OUTPATIENT
Start: 2025-06-19 | End: 2025-06-19 | Stop reason: HOSPADM

## 2025-06-19 RX ORDER — OXYCODONE AND ACETAMINOPHEN 7.5; 325 MG/1; MG/1
1 TABLET ORAL EVERY 4 HOURS PRN
Status: DISCONTINUED | OUTPATIENT
Start: 2025-06-19 | End: 2025-06-19 | Stop reason: HOSPADM

## 2025-06-19 RX ORDER — LIDOCAINE HYDROCHLORIDE 10 MG/ML
0.5 INJECTION, SOLUTION INFILTRATION; PERINEURAL ONCE AS NEEDED
Status: DISCONTINUED | OUTPATIENT
Start: 2025-06-19 | End: 2025-06-19 | Stop reason: HOSPADM

## 2025-06-19 RX ADMIN — CEFAZOLIN 2000 MG: 2 INJECTION, POWDER, FOR SOLUTION INTRAMUSCULAR; INTRAVENOUS at 17:05

## 2025-06-19 RX ADMIN — ONDANSETRON 4 MG: 2 INJECTION, SOLUTION INTRAMUSCULAR; INTRAVENOUS at 18:06

## 2025-06-19 RX ADMIN — ROCURONIUM BROMIDE 50 MG: 10 INJECTION, SOLUTION INTRAVENOUS at 17:17

## 2025-06-19 RX ADMIN — SODIUM CHLORIDE, POTASSIUM CHLORIDE, SODIUM LACTATE AND CALCIUM CHLORIDE 9 ML/HR: 600; 310; 30; 20 INJECTION, SOLUTION INTRAVENOUS at 13:52

## 2025-06-19 RX ADMIN — TRAMADOL HYDROCHLORIDE 50 MG: 50 TABLET, COATED ORAL at 00:44

## 2025-06-19 RX ADMIN — LISINOPRIL 40 MG: 20 TABLET ORAL at 00:44

## 2025-06-19 RX ADMIN — DEXAMETHASONE SODIUM PHOSPHATE 8 MG: 4 INJECTION, SOLUTION INTRAMUSCULAR; INTRAVENOUS at 17:25

## 2025-06-19 RX ADMIN — LISINOPRIL 40 MG: 20 TABLET ORAL at 21:22

## 2025-06-19 RX ADMIN — PROPOFOL 180 MG: 10 INJECTION, EMULSION INTRAVENOUS at 17:17

## 2025-06-19 RX ADMIN — FENTANYL CITRATE 50 MCG: 50 INJECTION, SOLUTION INTRAMUSCULAR; INTRAVENOUS at 17:21

## 2025-06-19 RX ADMIN — AMLODIPINE BESYLATE 5 MG: 5 TABLET ORAL at 08:14

## 2025-06-19 RX ADMIN — Medication 10 ML: at 08:14

## 2025-06-19 RX ADMIN — TRAMADOL HYDROCHLORIDE 50 MG: 50 TABLET, COATED ORAL at 08:13

## 2025-06-19 RX ADMIN — IPRATROPIUM BROMIDE AND ALBUTEROL SULFATE 3 ML: .5; 3 SOLUTION RESPIRATORY (INHALATION) at 18:50

## 2025-06-19 RX ADMIN — PHENYLEPHRINE HYDROCHLORIDE 100 MCG: 10 INJECTION INTRAVENOUS at 17:37

## 2025-06-19 RX ADMIN — SODIUM CHLORIDE 75 ML/HR: 9 INJECTION, SOLUTION INTRAVENOUS at 00:46

## 2025-06-19 RX ADMIN — PHENYLEPHRINE HYDROCHLORIDE 100 MCG: 10 INJECTION INTRAVENOUS at 17:31

## 2025-06-19 RX ADMIN — HYDROCODONE BITARTRATE AND ACETAMINOPHEN 1 TABLET: 5; 325 TABLET ORAL at 19:58

## 2025-06-19 RX ADMIN — SUGAMMADEX 200 MG: 100 INJECTION, SOLUTION INTRAVENOUS at 18:12

## 2025-06-19 RX ADMIN — Medication 10 ML: at 00:46

## 2025-06-19 RX ADMIN — KETOROLAC TROMETHAMINE 15 MG: 30 INJECTION INTRAMUSCULAR; INTRAVENOUS at 18:06

## 2025-06-19 RX ADMIN — VASOPRESSIN 1 UNITS: 20 INJECTION INTRAVENOUS at 17:42

## 2025-06-19 RX ADMIN — LIDOCAINE HYDROCHLORIDE 100 MG: 20 INJECTION, SOLUTION INFILTRATION; PERINEURAL at 17:17

## 2025-06-19 RX ADMIN — TRAMADOL HYDROCHLORIDE 50 MG: 50 TABLET, COATED ORAL at 21:22

## 2025-06-19 RX ADMIN — ROCURONIUM BROMIDE 20 MG: 10 INJECTION, SOLUTION INTRAVENOUS at 17:49

## 2025-06-19 NOTE — ANESTHESIA POSTPROCEDURE EVALUATION
Patient: Mariana Loya    Procedure Summary       Date: 06/19/25 Room / Location: [facility] OR [address] [place] [address] MAIN OR    Anesthesia Start: 1711 Anesthesia Stop: 1833    Procedure: VENTRAL/INCISIONAL HERNIA REPAIR (Abdomen) Diagnosis:       Incarcerated ventral hernia      (Incarcerated ventral hernia [K43.6])    Surgeons: Rito Tubbs MD Provider: Blanca Hardy MD    Anesthesia Type: general ASA Status: 2            Anesthesia Type: general    Vitals  Vitals Value Taken Time   /78 06/19/25 19:15   Temp 37.4 °C (99.3 °F) 06/19/25 18:24   Pulse 96 06/19/25 19:24   Resp 16 06/19/25 19:00   SpO2 99 % 06/19/25 19:24   Vitals shown include unfiled device data.        Post Anesthesia Care and Evaluation    Patient location during evaluation: bedside  Patient participation: complete - patient participated  Level of consciousness: awake and alert  Pain management: adequate    Airway patency: patent  Anesthetic complications: No anesthetic complications  PONV Status: controlled  Cardiovascular status: acceptable  Respiratory status: acceptable  Hydration status: acceptable

## 2025-06-19 NOTE — ANESTHESIA PREPROCEDURE EVALUATION
Anesthesia Evaluation     Patient summary reviewed and Nursing notes reviewed   NPO Solid Status: > 8 hours  NPO Liquid Status: > 2 hours           Airway   Mallampati: II  TM distance: >3 FB  Neck ROM: full  Dental    (+) poor dentition    Pulmonary    (+) pulmonary embolism (2022), a smoker Current, COPD,  Cardiovascular     (+) hypertension, DVT, hyperlipidemia  (-) past MI, dysrhythmias, angina, CHF, cardiac stents, CABG    ROS comment: 11/2022 TTE  ·  Left ventricular systolic function is normal. Calculated left ventricular EF = 54.4%  ·  Left ventricular wall thickness is consistent with mild concentric hypertrophy.  ·  Left ventricular diastolic function was normal.  ·  There is calcification of the aortic valve.      Neuro/Psych- negative ROS  GI/Hepatic/Renal/Endo - negative ROS     ROS Comment: Incarcerated ventral hernia    Musculoskeletal (-) negative ROS    Abdominal    Substance History - negative use     OB/GYN          Other - negative ROS                     Anesthesia Plan    ASA 2     general     intravenous induction     Anesthetic plan, risks, benefits, and alternatives have been provided, discussed and informed consent has been obtained with: patient.    CODE STATUS:    Code Status (Patient has no pulse and is not breathing): CPR (Attempt to Resuscitate)  Medical Interventions (Patient has pulse or is breathing): Full Support

## 2025-06-19 NOTE — CONSULTS
ASSESSMENT/PLAN:    65-year-old lady with infraumbilical incisional hernia related to either prior laparoscopic cholecystectomy or appendectomy.  Progressive symptoms which led to emergency room visit were related to incarceration of small bowel with partial obstruction which likely has now cleared.  She had a similar episode towards the end of May.  With 2 episodes of partial obstruction related to incisional hernia, I did recommend proceeding with open incisional hernia repair today.  She understands nature of the procedure and risks including but not limited to bleeding, infection, use of mesh, and recurrence.  She understands that ongoing tobacco use markedly increases her risks associated with surgical and anesthetic intervention, particularly the risk of infection, wound healing problems, and recurrence of hernia.    Additionally, she had a sessile serrated lesion on her last colonoscopy in 2019 and should have had a 3-year follow-up.  We will address this when she returns for postoperative visit and has recovered from current surgery.    Lastly, she has an additional incisional hernia in the epigastrium related to her laparoscopic cholecystectomy.  This hernia is asymptomatic and given that she is high risk as outlined above I do not feel it is appropriate to address this hernia today and should only address the symptomatic issue.    CC:     Abdominal pain    HPI:    65-year-old lady with periumbilical pain and visible and palpable bulge that has been present for some time.  The pain significantly worsened in the last 48 hours and led to emergency room visit and admission to observation unit.      ENDOSCOPY:   Colonoscopy 2019, sessile serrated lesion    RADIOLOGY:   CT abdomen pelvis 6/19/2025: Midline hernia present just below the umbilicus with loop of small bowel and surrounding fluid with thickening of bowel loop and proximal bowel dilation consistent with incarcerated ventral hernia and partial  obstruction.    LABS:    CBC normal  BMP with glucose of 136 and otherwise normal    SOCIAL HISTORY:   PPD tobacco use  Denies alcohol use    FAMILY HISTORY:    Colorectal cancer: negative    PREVIOUS ABDOMINAL SURGERY    Laparoscopic appendectomy 10/1/2024, Dr. Nika Majano  Open supraumbilical incisional hernia repair with mesh Dr. Diaz 9/8/2018 (Ventralex ST 6.4 cm circular patch was placed intraperitoneal)  Laparoscopic cholecystectomy 11/15/2001 Dr. Levi    PAST MEDICAL HISTORY:    Hypertension  Pulmonary embolism 2022.  Felt, by her, that it was related to covid booster  History of adenomatous polyps    MEDICATIONS:   Eliquis, last dose two nights ago  Norvasc  Lisinopril  Spiriva inhaler    ALLERGIES:   Statin, bruising    PHYSICAL EXAM:   Constitutional: No acute distress  Vital signs:   Weight 198 pounds  Height 67 inches  BMI 31.1  Blood pressure 176/68  Heart rate 77  Respiratory rate 17  Temperature 98.3  Respiratory: Normal nonlabored inspiratory effort  Cardiovascular: Regular rate, no jugular venous distention  Gastrointestinal: Soft, mild tenderness in the periumbilical region where there is a moderate size incisional hernia that is reducible and contains bowel.  She also has a smaller epigastric hernia at the laparoscopic cholecystectomy extraction site that is easily reducible and nontender.    HIRAM ORO M.D.

## 2025-06-19 NOTE — PROGRESS NOTES
ED OBSERVATION PROGRESS/DISCHARGE SUMMARY    Date of Admission: 6/18/2025   LOS: 0 days   PCP: Brigitte Alvarado APRN    Subjective patient reports she still has some pain but it is significantly improved.  She would like to proceed with surgery.    Hospital Outcome: 65-year-old female admitted to the observation unit for further evaluation of ventral hernia.  Patient has known hernia and was told by general surgery group at [facility] that they would not perform surgery until she stopped smoking.  There was some concern for incarcerated hernia on CT scan, but hernia was easily reduced in the emergency department here.    6/19/2025: Patient was seen by general surgery, Dr. Tubbs.  He plans to proceed with open incisional hernia repair today.  Discussed with patient who expresses understanding and is in agreement with plan.    ROS:  General: no fevers, chills  Respiratory: no cough, dyspnea  Cardiovascular: no chest pain, palpitations  Abdomen: No abdominal pain, nausea, vomiting, or diarrhea  Neurologic: No focal weakness    Objective   Physical Exam:  I have reviewed the vital signs.  Temp:  [98.1 °F (36.7 °C)-99.3 °F (37.4 °C)] 98.1 °F (36.7 °C)  Heart Rate:  [] 77  Resp:  [16-17] 17  BP: (118-183)/(60-93) 118/66  General Appearance:    Alert, cooperative, no distress  Head:    Normocephalic, atraumatic  Eyes:    Sclerae anicteric  Neck:   Supple, no mass  Lungs: Clear to auscultation bilaterally, respirations unlabored  Heart: Regular rate and rhythm, S1 and S2 normal, no murmur, rub or gallop  Abdomen:  Soft, nontender, bowel sounds active, nondistended  Extremities: No clubbing, cyanosis, or edema to lower extremities  Pulses:  2+ and symmetric in distal lower extremities  Skin: No rashes   Neurologic: Oriented x3, Normal strength to extremities    Results Review:    I have reviewed the labs, radiology results and diagnostic studies.    Results from last 7 days   Lab Units 06/19/25  0354   WBC  10*3/mm3 5.10   HEMOGLOBIN g/dL 12.1   HEMATOCRIT % 36.1   PLATELETS 10*3/mm3 203     Results from last 7 days   Lab Units 06/19/25  0354 06/18/25  1920   SODIUM mmol/L 136 136   POTASSIUM mmol/L 3.5 3.3*   CHLORIDE mmol/L 104 102   CO2 mmol/L 22.0 23.0   BUN mg/dL 8.0 7.0*   CREATININE mg/dL 0.62 0.60   CALCIUM mg/dL 8.5* 8.8   BILIRUBIN mg/dL  --  0.5   ALK PHOS U/L  --  87   ALT (SGPT) U/L  --  12   AST (SGOT) U/L  --  13   GLUCOSE mg/dL 136* 142*     Imaging Results (Last 24 Hours)       Procedure Component Value Units Date/Time    CT Abdomen Pelvis With Contrast [[id number]] Collected: 06/18/25 2031     Updated: 06/18/25 2045    Narrative:      CT ABDOMEN AND PELVIS WITH IV CONTRAST     HISTORY: Abdominal pain     TECHNIQUE: Radiation dose reduction techniques were utilized, including  automated exposure control and exposure modulation based on body size.  Axial images were obtained through the abdomen and pelvis after the  administration of IV contrast. Coronal and sagittal reformatted images  obtained.     COMPARISON: 9/8/2018     FINDINGS:      ABDOMEN:  Mild atelectasis/scarring in the lung bases. There is a small hiatal  hernia. There is a stable small hemangioma in the left lobe of the  liver. Cholecystectomy. The spleen is normal in size. The right kidney  is small with respect to the left kidney. The kidneys are otherwise  unremarkable. The adrenal glands are unremarkable. The pancreas is  unremarkable. There is a small fat-containing upper abdominal wall  hernia again demonstrated. There is a midline hernia present located  just below the umbilicus. There is loop of small bowel contained within  the hernia sac and there is fluid in the hernia sac surrounding the  small bowel and some thickening of the bowel loop within the hernia sac.  The small bowel loop proximal to the hernia sac is dilated and  fluid-filled. Overall the CT appearance is concerning for possible  incarcerated ventral hernia with at  least partial obstruction. Correlate  with physical exam findings to see if the hernia is reducible.     PELVIS:  The bladder is unremarkable. There is a right adnexal cyst again  demonstrated measuring about 2.7 cm not significantly changed. There is  no free fluid in the pelvis. Colonic diverticulosis.       Impression:      1. There is a midline hernia present just below the umbilicus. There is  a loop of small bowel within the hernia sac with surrounding fluid in  the hernia sac and some thickening of the bowel loop. The small bowel  loop proximal to the hernia sac is dilated and fluid-filled. Overall the  CT appearance is concerning for possible incarcerated ventral hernia  with a least partial obstruction. Correlate with physical exam findings  to see if the hernia is reducible. Surgical consultation may be needed  2. Additional findings as described     Radiation dose reduction techniques were utilized, including automated  exposure control and exposure modulation based on body size.        This report was finalized on 6/18/2025 8:42 PM by Dr. Eddi Barrientos M.D on Workstation: [id number]               I have reviewed the medications.  ---------------------------------------------------------------------------------------------  Assessment & Plan   Assessment/Problem List    Incarcerated hernia of abdominal cavity    Incarcerated ventral hernia      Plan:    Abdominal pain  Ventral incisional hernia  -Vital signs every 4 hours  - Continuous pulse ox  - General Surgery consult in a.m.  - N.p.o.  - Normal saline at 75 mL/h  - As needed antiemetics  - To the OR for open incisional hernia repair today, 6/19/2025     Tobacco abuse  - Nicotine patch offered    Disposition: To the OR for open incisional hernia repair    36 minutes has been spent by [facility] providers in the care of this patient while under observation status     This note will serve as transfer/progress  note    CARLA Llanos 06/19/25 07:52 EDT    I have worn appropriate PPE during this patient encounter and sanitized my hands with entering and exiting patient room.

## 2025-06-19 NOTE — ED PROVIDER NOTES
EMERGENCY DEPARTMENT ENCOUNTER    Room number:  36/36  Date Seen:  6/18/2025  PCP:  Brigitte Alvarado APRN    Laboratory Results:  Recent Results (from the past 24 hours)   Comprehensive Metabolic Panel    Collection Time: 06/18/25  7:20 PM    Specimen: Blood   Result Value Ref Range    Glucose 142 (H) 65 - 99 mg/dL    BUN 7.0 (L) 8.0 - 23.0 mg/dL    Creatinine 0.60 0.57 - 1.00 mg/dL    Sodium 136 136 - 145 mmol/L    Potassium 3.3 (L) 3.5 - 5.2 mmol/L    Chloride 102 98 - 107 mmol/L    CO2 23.0 22.0 - 29.0 mmol/L    Calcium 8.8 8.6 - 10.5 mg/dL    Total Protein 6.7 6.0 - 8.5 g/dL    Albumin 3.4 (L) 3.5 - 5.2 g/dL    ALT (SGPT) 12 1 - 33 U/L    AST (SGOT) 13 1 - 32 U/L    Alkaline Phosphatase 87 39 - 117 U/L    Total Bilirubin 0.5 0.0 - 1.2 mg/dL    Globulin 3.3 gm/dL    A/G Ratio 1.0 g/dL    BUN/Creatinine Ratio 11.7 7.0 - 25.0    Anion Gap 11.0 5.0 - 15.0 mmol/L    eGFR 99.1 >60.0 mL/min/1.73   Lipase    Collection Time: 06/18/25  7:20 PM    Specimen: Blood   Result Value Ref Range    Lipase 21 13 - 60 U/L   CBC Auto Differential    Collection Time: 06/18/25  7:20 PM    Specimen: Blood   Result Value Ref Range    WBC 5.79 3.40 - 10.80 10*3/mm3    RBC 4.23 3.77 - 5.28 10*6/mm3    Hemoglobin 13.1 12.0 - 15.9 g/dL    Hematocrit 37.9 34.0 - 46.6 %    MCV 89.6 79.0 - 97.0 fL    MCH 31.0 26.6 - 33.0 pg    MCHC 34.6 31.5 - 35.7 g/dL    RDW 12.5 12.3 - 15.4 %    RDW-SD 39.9 37.0 - 54.0 fl    MPV 10.3 6.0 - 12.0 fL    Platelets 210 140 - 450 10*3/mm3    Neutrophil % 55.2 42.7 - 76.0 %    Lymphocyte % 31.4 19.6 - 45.3 %    Monocyte % 11.2 5.0 - 12.0 %    Eosinophil % 1.6 0.3 - 6.2 %    Basophil % 0.3 0.0 - 1.5 %    Immature Grans % 0.3 0.0 - 0.5 %    Neutrophils, Absolute 3.19 1.70 - 7.00 10*3/mm3    Lymphocytes, Absolute 1.82 0.70 - 3.10 10*3/mm3    Monocytes, Absolute 0.65 0.10 - 0.90 10*3/mm3    Eosinophils, Absolute 0.09 0.00 - 0.40 10*3/mm3    Basophils, Absolute 0.02 0.00 - 0.20 10*3/mm3    Immature Grans, Absolute  0.02 0.00 - 0.05 10*3/mm3    nRBC 0.0 0.0 - 0.2 /100 WBC     I reviewed the above results.    Radiology:  CT Abdomen Pelvis With Contrast  Result Date: 6/18/2025  CT ABDOMEN AND PELVIS WITH IV CONTRAST  HISTORY: Abdominal pain  TECHNIQUE: Radiation dose reduction techniques were utilized, including automated exposure control and exposure modulation based on body size. Axial images were obtained through the abdomen and pelvis after the administration of IV contrast. Coronal and sagittal reformatted images obtained.  COMPARISON: 9/8/2018  FINDINGS:  ABDOMEN: Mild atelectasis/scarring in the lung bases. There is a small hiatal hernia. There is a stable small hemangioma in the left lobe of the liver. Cholecystectomy. The spleen is normal in size. The right kidney is small with respect to the left kidney. The kidneys are otherwise unremarkable. The adrenal glands are unremarkable. The pancreas is unremarkable. There is a small fat-containing upper abdominal wall hernia again demonstrated. There is a midline hernia present located just below the umbilicus. There is loop of small bowel contained within the hernia sac and there is fluid in the hernia sac surrounding the small bowel and some thickening of the bowel loop within the hernia sac. The small bowel loop proximal to the hernia sac is dilated and fluid-filled. Overall the CT appearance is concerning for possible incarcerated ventral hernia with at least partial obstruction. Correlate with physical exam findings to see if the hernia is reducible.  PELVIS: The bladder is unremarkable. There is a right adnexal cyst again demonstrated measuring about 2.7 cm not significantly changed. There is no free fluid in the pelvis. Colonic diverticulosis.      1. There is a midline hernia present just below the umbilicus. There is a loop of small bowel within the hernia sac with surrounding fluid in the hernia sac and some thickening of the bowel loop. The small bowel loop proximal  to the hernia sac is dilated and fluid-filled. Overall the CT appearance is concerning for possible incarcerated ventral hernia with a least partial obstruction. Correlate with physical exam findings to see if the hernia is reducible. Surgical consultation may be needed 2. Additional findings as described  Radiation dose reduction techniques were utilized, including automated exposure control and exposure modulation based on body size.   This report was finalized on 2025 8:42 PM by Dr. Eddi Barrientos M.D on Workstation: [id number]      CT Abdomen Pelvis With Contrast  Result Date: 2025  REVIEWING YOUR TEST RESULTS IN [facility] IS NOT A SUBSTITUTE FOR DISCUSSING THOSE RESULTS WITH YOUR HEALTH CARE PROVIDER. PLEASE CONTACT YOUR PROVIDER VIA [facility] TO DISCUSS ANY QUESTIONS OR CONCERNS YOU MAY HAVE REGARDING THESE TEST RESULTS.  RADIOLOGY REPORT FACILITY:  [facility] UNIT/AGE/GENDER: JULIANA  ER      AGE:65 Y          SEX:F PATIENT NAME/:  FATOU SOFIA    1960 UNIT NUMBER:  [id number] ACCOUNT NUMBER:  [id number] ACCESSION NUMBER:  [id number]   EXAMINATION: CT ABDOMEN AND PELVIS WITH CONTRAST                            DATE:  2025 HISTORY:Left lower quadrant abdominal pain and diarrhea TECHNIQUE:   Helical CT of the abdomen and pelvis was performed following the administration of oral contrast and during an uneventful injection of 100 mL of opitray 300 nonionic contrast media.   CT scans at this facility use dose modulation, iterative reconstruction and weight based dosing to reduce radiation dose to as low as reasonably achievable COMPARISON: FINDINGS: The lung bases are clear. Again seen is a approximately 1 cm left lobe liver hemangioma, grossly stable. The liver is otherwise unremarkable. The gallbladder surgically absent. There is significant common bile duct dilatation measuring up to 2 cm in diameter without centrally obstructing abnormality,  stable in appearance compared to the prior exam. There is mild pancreatic ductal dilatation, in the pancreatic head, grossly stable. The spleen is unremarkable. The adrenal glands are unremarkable. The kidneys are unremarkable. There is a upper abdominal ventral hernia containing fat only. There is a mid to lower abdominal ventral hernia to the right of midline containing loops of small bowel. Small bowel loops are mildly distended and fluid-filled however there is no definite evidence for acute small bowel obstruction. There is extensive colonic diverticulosis with mild pericolonic inflammatory change involving the distal descending/proximal sigmoid colon. Mild acute diverticulitis cannot be excluded.. There is no free fluid or air. There is no focal fluid collection or abscess. There is diffuse calcific atherosclerotic disease with evidence for moderate right common iliac artery stenosis, unchanged. Again seen is a 3 x 2.5 cm right ovarian cyst, grossly stable. Degenerative changes are seen in the lumbar spine. IMPRESSION: 1.   Upper abdominal ventral hernia containing fat only 2.  Mid to lower abdominal ventral hernia to the right midline containing loops of small bowel. Small bowel loops are mildly distended and fluid-filled however there is no definite evidence for acute small bowel obstruction. 3.  Extensive colonic diverticulosis with mild pericolonic inflammatory change involving the distal descending/proximal sigmoid colon. Mild acute diverticulitis cannot be excluded 4.  Stable 3 x 2.5 cm right ovarian cyst. 5.  Additional chronic findings above. Dictated by: Aron Garcia M.D. Images and Report reviewed and interpreted by: Aron Garcia M.D. <PS><Electronically signed by: Aron Garcia M.D.> 05/26/2025 1114 D: 05/26/2025 1101 T: 05/26/2025 1101      I reviewed the above results    Medications ordered in ED:  Medications   sodium chloride 0.9 % flush 10 mL (has no administration in time range)    iopamidol (ISOVUE-300) 61 % injection 85 mL (85 mL Intravenous Given 6/18/25 2019)   morphine injection 4 mg (4 mg Intravenous Given 6/18/25 2134)   ondansetron (ZOFRAN) injection 4 mg (4 mg Intravenous Given 6/18/25 2134)       ED Course as of 06/18/25 2159 Wed Jun 18, 2025 1938 .  Patient's lab work pending.  CT scan pending.  Will be transferring care to oncoming provider [SL]   2006 I discussed the case with Dr. Sandhu and joined the patient's care team at this time pending CT and dispo   [CC]   2030 CT Abdomen Pelvis With Contrast  My independent interpretation of the CT of the abdomen pelvis is no bowel obstruction, multiple incisional hernias [CC]   2134 CT revealed what appeared to be a potential incarcerated hernia with a possible partial bowel obstruction.  The hernia is palpable but easily reducible it is soft and not particularly tender to palpation.  Patient does report some pain to the area.  She is agreeable to admission for observation and surgery consultation. [CC]   2154 I rechecked the patient.  I discussed the patient's labs, radiology findings (including all incidental findings), diagnosis, and plan for admission. The patient understands and agrees with the plan. [CC]   2154 Spoke with Dr. Healy with general surgery.  She reviewed the patient's chart and agrees with plan for observation.  They will see in the morning. [CC]   2156 Spoke with JAVIER Copeland with Observation Unit.  Reviewed history, exam, results, treatments.  She agrees admit the patient to Dr. Barajas.   [CC]      ED Course User Index  [CC] Carlene Calhoun PA-C  [SL] Gianluca Sandhu MD       Diagnosis:  Final diagnoses:   Incarcerated ventral hernia           Provider attestation:  I personally reviewed the past medical history, past surgical history, social history, family history, current medications, and allergies as they appear in the chart.    The patient was seen and examined by myself and Dr. Sandhu, who  agree with plan.      Carlene Calhoun PA-C  06/18/25 2158       Carlene Calhoun PA-C  06/18/25 2154

## 2025-06-19 NOTE — OP NOTE
PREOPERATIVE DIAGNOSIS:  Previously incarcerated infraumbilical incisional hernia    POSTOPERATIVE DIAGNOSIS (FINDINGS):  Same    PROCEDURE:  Open incisional hernia repair, 4 cm fascial defect, with large Ventralex ST mesh    SURGEON:  Rito Tubbs MD    ASSISTANT:  Irineo Wylie was responsible for performing the following activities: suction, irrigation, suturing, closing, retraction, and placing dressing, and their skilled assistance was necessary for the success of this case.    ANESTHESIA:  General    EBL:  Minimal    SPECIMEN(S):  none    DESCRIPTION:  In supine position under general anesthetic prepped and draped usual sterile manner.  Small lower midline incision made and hernia sac identified and circumferentially dissected down to the level of the fascia.  The hernia sac was divided  from the fascia with the electrocautery.  The fascial defect measured 4 cm.  Large Ventralex ST mesh was inserted intraperitoneal after inserting Seprafilm procedure pack.  The fascia was closed over the mesh with interrupted 0 Ethibond sutures incorporating bite of the mesh with each suture.  Good hemostasis was noted.  Skin was closed with 3-0 Vicryl deep dermal followed by 5-0 Vicryl subcuticular followed by Exofin.  Tolerated well.    Rito Tubbs M.D.

## 2025-06-19 NOTE — PLAN OF CARE
Goal Outcome Evaluation:              Outcome Evaluation: Patient admitted due to lower abdominal pain with history of hernia. CT done showed hernia.  Pain medicine given as ordered for pain prn. Night medicine given. She is alert and oriented, on room air and independent baseline. Instructed to NPO after midnight.  General surgery consult in am.

## 2025-06-19 NOTE — H&P
[facility]   HISTORY AND PHYSICAL    Patient Name: Mariana Loya  : 1960  MRN: [id number]  Primary Care Physician:  Brigitte Alvarado APRN  Date of admission: 2025    Patient Care Team:  Brigitte Alvarado APRN as PCP - General (Nurse Practitioner)       Subjective   Subjective     Chief Complaint:   Chief Complaint   Patient presents with    Abdominal Pain         HPI:    Mariana Loya is a 65 y.o. female, with a past medical history including, but not limited to, ventral hernia, diverticulitis, DVT, PE, hypertension, and hyperlipidemia, was admitted to the observation unit after having an incarcerated ventral hernia that was able to be reduced in the emergency department.  She states that she has had an increase in abdominal pain over the past 2 weeks.  She denies any nausea, vomiting, diarrhea with the pain.  She does states she has had a decreased appetite however.  She states she has met with a general surgeon who has told her that they would not perform the surgery until she has stopped smoking, which she states that she has not done.  General surgery has been consulted to see the patient in the a.m.  She will remain n.p.o. until seen.    Review of Systems   All systems were reviewed and negative except for: What was mentioned above in the HPI.    Personal History     Past Medical History:   Diagnosis Date    Colon polyps 10/07/2013    Ascending colon biopsy: fragments of tubular adenoma with low grade dysplasia    Diverticulitis of colon     Diverticulosis 10/07/2013    Found in Colonoscopy done by Dr. Eddi Anne    DVT (deep venous thrombosis)     Hyperlipidemia     Hypertension Lipitor 30 mg daily    Superficial thrombosis of leg, left        Past Surgical History:   Procedure Laterality Date    CHOLECYSTECTOMY      COLONOSCOPY      Dr. Anne    COLONOSCOPY N/A 04/15/2019    Procedure: COLONOSCOPY into cecum and TI with hot snare polypectomy with 10  cc saline lift;  Surgeon:  Eddi Anne MD;  Location: [facility] ENDOSCOPY;  Service: Gastroenterology    COLONOSCOPY W/ POLYPECTOMY N/A 10/07/2013    One 10 mm polyp in the proximal ascending colon: resected and retrieved, diverticulosis in the sigmoid colon and in the descending colon-Dr. Eddi Anne     EYE SURGERY  Renal tear laser surgery    HERNIA REPAIR      KNEE ARTHROSCOPY W/ MENISCECTOMY Left 11/17/2006    Left knee arthroscopy with complete lateral meniscectomy-Dr. Ming Thorpe    LAPAROSCOPIC CHOLECYSTECTOMY W/ CHOLANGIOGRAPHY N/A 11/14/2001    Dr. Lucy Levi    TONSILLECTOMY AND ADENOIDECTOMY N/A     VENTRAL/INCISIONAL HERNIA REPAIR N/A 09/08/2018    Procedure: incarcerated /INCISIONAL HERNIA REPAIR with mesh;  Surgeon: Kody Diaz MD;  Location: [facility] MAIN OR;  Service: General    WISDOM TOOTH EXTRACTION N/A 1980    Dr. Wong       Family History: family history includes Arthritis in her mother; Diabetes in her mother; Early death in her father; Heart disease in her father and mother; Hypertension in her mother; No Known Problems in her brother, daughter, maternal aunt, maternal grandmother, paternal aunt, paternal grandmother, sister, and son. Otherwise pertinent FHx was reviewed and not pertinent to current issue.    Social History:  reports that she has been smoking cigarettes. She has a 50 pack-year smoking history. She has never used smokeless tobacco. She reports that she does not drink alcohol and does not use drugs.    Home Medications:  lisinopril and tiotropium bromide monohydrate    Allergies:  Allergies   Allergen Reactions    Statins Other (See Comments)     bruising       Objective   Objective     Vitals:   Temp:  [99.3 °F (37.4 °C)] 99.3 °F (37.4 °C)  Heart Rate:  [] 81  Resp:  [16] 16  BP: (155-183)/(62-93) 161/64  Physical Exam    Constitutional: Awake, alert   Eyes: PERRLA, sclerae anicteric, no conjunctival injection   HENT: NCAT, mucous membranes moist   Neck: Supple, no  thyromegaly, no lymphadenopathy, trachea midline   Respiratory: Clear to auscultation bilaterally, nonlabored respirations    Cardiovascular: RRR, no murmurs, rubs, or gallops, palpable pedal pulses bilaterally   Gastrointestinal: Positive bowel sounds, soft, generalized tenderness, nondistended   Musculoskeletal: No bilateral ankle edema, no clubbing or cyanosis to extremities   Psychiatric: Appropriate affect, cooperative   Neurologic: Oriented x 3, strength symmetric in all extremities, Cranial Nerves grossly intact to confrontation, speech clear   Skin: No rashes     Result Review    Result Review:  I have personally reviewed the results from the time of this admission to 6/18/2025 22:02 EDT and agree with these findings:  [x]  Laboratory list / accordion  []  Microbiology  [x]  Radiology  []  EKG/Telemetry   []  Cardiology/Vascular   []  Pathology  [x]  Old records  []  Other:    Initial lab work done in the emergency department is unremarkable other than a potassium of 3.3, glucose 142, albumin 3.4.  CT abdomen pelvis with contrast shows there is a midline hernia present just below the umbilicus.  There was a small loop of small bowel within the hernia sac surrounding fluid in the hernia sac and some thickening of the bowel loop.  The small bowel loop proximate little to the hernia sac is dilated and fluid-filled.    The 10-year ASCVD risk score (Lisa DK, et al., 2019) is: 25.8%    Values used to calculate the score:      Age: 65 years      Sex: Female      Is Non- : No      Diabetic: No      Tobacco smoker: Yes      Systolic Blood Pressure: 161 mmHg      Is BP treated: Yes      HDL Cholesterol: 36 mg/dL      Total Cholesterol: 206 mg/dL     Assessment & Plan   Assessment / Plan     Brief Patient Summary:  Mariana Loya is a 65 y.o. female who was admitted to the observation unit for further evaluation and treatment of her abdominal pain and nausea.  General surgery has been consulted  to see the patient in the AM.    Active Hospital Problems:  Active Hospital Problems    Diagnosis     **Incarcerated hernia of abdominal cavity      Plan:     Abdominal pain  Incarcerated ventral hernia-reduced  -Vital signs every 4 hours  - Continuous pulse ox  - General Surgery consult in a.m.  - N.p.o.  - Normal saline at 75 mL/h  - As needed antiemetics    Tobacco abuse  - Nicotine patch offered    VTE Prophylaxis:  Mechanical VTE prophylaxis orders are present.        CODE STATUS:    Code Status (Patient has no pulse and is not breathing): CPR (Attempt to Resuscitate)  Medical Interventions (Patient has pulse or is breathing): Full Support    Admission Status:  I believe this patient meets observation status.    76 minutes have been spent by [facility] providers in the care of this patient while under observation status on 06/18/25 .      Appropriate PPE worn during patient encounter.  Hand hygeine performed before and after seeing the patient.      Electronically signed by JAVIER Maldonado, 06/18/25, 10:02 PM EDT.

## 2025-06-19 NOTE — PLAN OF CARE
Goal Outcome Evaluation: pt transferred to pre/post. Pt left with all belongings. Pt is A/O x4, is agreeable to the plan of care and verbalizes understanding.       Problem: Adult Inpatient Plan of Care  Goal: Plan of Care Review  Outcome: Progressing  Goal: Patient-Specific Goal (Individualized)  Outcome: Progressing  Goal: Absence of Hospital-Acquired Illness or Injury  Outcome: Progressing  Intervention: Identify and Manage Fall Risk  Recent Flowsheet Documentation  Taken 6/19/2025 0933 by Lynda Fox RN  Safety Promotion/Fall Prevention:   room organization consistent   safety round/check completed  Taken 6/19/2025 0843 by Lynda Fox RN  Safety Promotion/Fall Prevention:   room organization consistent   safety round/check completed  Taken 6/19/2025 0813 by Lynda Fox RN  Safety Promotion/Fall Prevention:   room organization consistent   safety round/check completed  Intervention: Prevent Skin Injury  Recent Flowsheet Documentation  Taken 6/19/2025 0933 by Lynda Fox RN  Body Position: position changed independently  Taken 6/19/2025 0843 by Lynda Fox RN  Body Position: position changed independently  Taken 6/19/2025 0813 by Lynda Fox RN  Body Position: position changed independently  Intervention: Prevent and Manage VTE (Venous Thromboembolism) Risk  Recent Flowsheet Documentation  Taken 6/19/2025 0813 by Lynda Fox RN  VTE Prevention/Management:   SCDs (sequential compression devices) off   patient refused intervention  Intervention: Prevent Infection  Recent Flowsheet Documentation  Taken 6/19/2025 0933 by Lynda Fox RN  Infection Prevention: single patient room provided  Taken 6/19/2025 0843 by Lynda Fox RN  Infection Prevention: single patient room provided  Taken 6/19/2025 0813 by Lynda Fox RN  Infection Prevention: single patient room provided  Goal: Optimal Comfort and Wellbeing  Outcome: Progressing  Intervention: Provide  Person-Centered Care  Recent Flowsheet Documentation  Taken 6/19/2025 0933 by Lynda Fox RN  Trust Relationship/Rapport:   care explained   choices provided  Taken 6/19/2025 0843 by Lynda Fox RN  Trust Relationship/Rapport:   care explained   choices provided  Taken 6/19/2025 0813 by Lnyda Fox RN  Trust Relationship/Rapport:   care explained   choices provided  Goal: Readiness for Transition of Care  Outcome: Progressing     Problem: Pain Acute  Goal: Optimal Pain Control and Function  Outcome: Progressing  Intervention: Prevent or Manage Pain  Recent Flowsheet Documentation  Taken 6/19/2025 0933 by Lynda Fox RN  Medication Review/Management: medications reviewed  Taken 6/19/2025 0813 by Lynda Fox RN  Medication Review/Management: medications reviewed     Problem: Comorbidity Management  Goal: Blood Pressure in Desired Range  Outcome: Progressing  Intervention: Maintain Blood Pressure Management  Recent Flowsheet Documentation  Taken 6/19/2025 0933 by Lynda Fox RN  Medication Review/Management: medications reviewed  Taken 6/19/2025 0813 by Lynda Fox RN  Medication Review/Management: medications reviewed

## 2025-06-19 NOTE — ANESTHESIA PROCEDURE NOTES
Airway  Reason: elective    Date/Time: 6/19/2025 5:20 PM  Airway not difficult    General Information and Staff    Patient location during procedure: OR  Anesthesiologist: Kaylee Tang MD  CRNA/CAA: Angella Russell CRNA    Indications and Patient Condition  Indications for airway management: airway protection    Preoxygenated: yes    Mask difficulty assessment: 2 - vent by mask + OA or adjuvant +/- NMBA    Final Airway Details    Final airway type: endotracheal airway      Successful airway: ETT  Cuffed: yes   Successful intubation technique: direct laryngoscopy  Adjuncts used in placement: intubating stylet  Endotracheal tube insertion site: oral  Blade: Kendra  Blade size: 3  ETT size (mm): 7.0  Cormack-Lehane Classification: grade I - full view of glottis  Placement verified by: chest auscultation and capnometry   Inital cuff pressure (cm H2O): 19  Cuff volume (mL): 8  Measured from: lips  Number of attempts at approach: 1  Assessment: lips, teeth, and gum same as pre-op and atraumatic intubation

## 2025-06-20 ENCOUNTER — APPOINTMENT (OUTPATIENT)
Dept: GENERAL RADIOLOGY | Facility: HOSPITAL | Age: 65
End: 2025-06-20
Payer: MEDICARE

## 2025-06-20 LAB
ANION GAP SERPL CALCULATED.3IONS-SCNC: 9 MMOL/L (ref 5–15)
BASOPHILS # BLD AUTO: 0.01 10*3/MM3 (ref 0–0.2)
BASOPHILS NFR BLD AUTO: 0.1 % (ref 0–1.5)
BUN SERPL-MCNC: 8 MG/DL (ref 8–23)
BUN/CREAT SERPL: 14 (ref 7–25)
CALCIUM SPEC-SCNC: 8.3 MG/DL (ref 8.6–10.5)
CHLORIDE SERPL-SCNC: 103 MMOL/L (ref 98–107)
CO2 SERPL-SCNC: 25 MMOL/L (ref 22–29)
CREAT SERPL-MCNC: 0.57 MG/DL (ref 0.57–1)
DEPRECATED RDW RBC AUTO: 40.6 FL (ref 37–54)
EGFRCR SERPLBLD CKD-EPI 2021: 100.4 ML/MIN/1.73
EOSINOPHIL # BLD AUTO: 0 10*3/MM3 (ref 0–0.4)
EOSINOPHIL NFR BLD AUTO: 0 % (ref 0.3–6.2)
ERYTHROCYTE [DISTWIDTH] IN BLOOD BY AUTOMATED COUNT: 12.4 % (ref 12.3–15.4)
GLUCOSE SERPL-MCNC: 183 MG/DL (ref 65–99)
HCT VFR BLD AUTO: 37.6 % (ref 34–46.6)
HGB BLD-MCNC: 12.9 G/DL (ref 12–15.9)
IMM GRANULOCYTES # BLD AUTO: 0.04 10*3/MM3 (ref 0–0.05)
IMM GRANULOCYTES NFR BLD AUTO: 0.5 % (ref 0–0.5)
LYMPHOCYTES # BLD AUTO: 0.85 10*3/MM3 (ref 0.7–3.1)
LYMPHOCYTES NFR BLD AUTO: 10.7 % (ref 19.6–45.3)
MCH RBC QN AUTO: 30.9 PG (ref 26.6–33)
MCHC RBC AUTO-ENTMCNC: 34.3 G/DL (ref 31.5–35.7)
MCV RBC AUTO: 90 FL (ref 79–97)
MONOCYTES # BLD AUTO: 0.68 10*3/MM3 (ref 0.1–0.9)
MONOCYTES NFR BLD AUTO: 8.6 % (ref 5–12)
NEUTROPHILS NFR BLD AUTO: 6.36 10*3/MM3 (ref 1.7–7)
NEUTROPHILS NFR BLD AUTO: 80.1 % (ref 42.7–76)
NRBC BLD AUTO-RTO: 0 /100 WBC (ref 0–0.2)
PLATELET # BLD AUTO: 203 10*3/MM3 (ref 140–450)
PMV BLD AUTO: 10.7 FL (ref 6–12)
POTASSIUM SERPL-SCNC: 4.1 MMOL/L (ref 3.5–5.2)
RBC # BLD AUTO: 4.18 10*6/MM3 (ref 3.77–5.28)
SODIUM SERPL-SCNC: 137 MMOL/L (ref 136–145)
WBC NRBC COR # BLD AUTO: 7.94 10*3/MM3 (ref 3.4–10.8)

## 2025-06-20 PROCEDURE — 94799 UNLISTED PULMONARY SVC/PX: CPT

## 2025-06-20 PROCEDURE — 80048 BASIC METABOLIC PNL TOTAL CA: CPT | Performed by: SURGERY

## 2025-06-20 PROCEDURE — 25010000002 CEFAZOLIN PER 500 MG: Performed by: SURGERY

## 2025-06-20 PROCEDURE — 71045 X-RAY EXAM CHEST 1 VIEW: CPT

## 2025-06-20 PROCEDURE — 94640 AIRWAY INHALATION TREATMENT: CPT

## 2025-06-20 PROCEDURE — 99238 HOSP IP/OBS DSCHRG MGMT 30/<: CPT | Performed by: SURGERY

## 2025-06-20 PROCEDURE — 63710000001 REVEFENACIN 175 MCG/3ML SOLUTION: Performed by: SURGERY

## 2025-06-20 PROCEDURE — 94618 PULMONARY STRESS TESTING: CPT

## 2025-06-20 PROCEDURE — 25010000002 HYDROMORPHONE PER 4 MG: Performed by: SURGERY

## 2025-06-20 PROCEDURE — G0378 HOSPITAL OBSERVATION PER HR: HCPCS

## 2025-06-20 PROCEDURE — 85025 COMPLETE CBC W/AUTO DIFF WBC: CPT | Performed by: SURGERY

## 2025-06-20 RX ORDER — SODIUM CHLORIDE FOR INHALATION 7 %
4 VIAL, NEBULIZER (ML) INHALATION
Status: DISCONTINUED | OUTPATIENT
Start: 2025-06-20 | End: 2025-06-21 | Stop reason: HOSPADM

## 2025-06-20 RX ORDER — HYDROCODONE BITARTRATE AND ACETAMINOPHEN 5; 325 MG/1; MG/1
1 TABLET ORAL EVERY 4 HOURS PRN
Qty: 10 TABLET | Refills: 0 | Status: SHIPPED | OUTPATIENT
Start: 2025-06-20 | End: 2025-06-26

## 2025-06-20 RX ORDER — ALBUTEROL SULFATE 0.83 MG/ML
2.5 SOLUTION RESPIRATORY (INHALATION)
Status: DISCONTINUED | OUTPATIENT
Start: 2025-06-20 | End: 2025-06-21 | Stop reason: HOSPADM

## 2025-06-20 RX ORDER — HYDROCODONE BITARTRATE AND ACETAMINOPHEN 5; 325 MG/1; MG/1
2 TABLET ORAL EVERY 4 HOURS PRN
Refills: 0 | Status: DISCONTINUED | OUTPATIENT
Start: 2025-06-20 | End: 2025-06-21 | Stop reason: HOSPADM

## 2025-06-20 RX ORDER — ONDANSETRON 4 MG/1
4 TABLET, FILM COATED ORAL EVERY 6 HOURS PRN
Qty: 10 TABLET | Refills: 0 | Status: SHIPPED | OUTPATIENT
Start: 2025-06-20 | End: 2025-06-26

## 2025-06-20 RX ORDER — GUAIFENESIN 600 MG/1
1200 TABLET, EXTENDED RELEASE ORAL EVERY 12 HOURS SCHEDULED
Status: DISCONTINUED | OUTPATIENT
Start: 2025-06-20 | End: 2025-06-21 | Stop reason: HOSPADM

## 2025-06-20 RX ADMIN — HYDROCODONE BITARTRATE AND ACETAMINOPHEN 2 TABLET: 5; 325 TABLET ORAL at 11:27

## 2025-06-20 RX ADMIN — GUAIFENESIN 1200 MG: 600 TABLET, MULTILAYER, EXTENDED RELEASE ORAL at 21:27

## 2025-06-20 RX ADMIN — AMLODIPINE BESYLATE 5 MG: 5 TABLET ORAL at 08:16

## 2025-06-20 RX ADMIN — CEFAZOLIN 2000 MG: 2 INJECTION, POWDER, FOR SOLUTION INTRAMUSCULAR; INTRAVENOUS at 08:16

## 2025-06-20 RX ADMIN — HYDROCODONE BITARTRATE AND ACETAMINOPHEN 2 TABLET: 5; 325 TABLET ORAL at 16:29

## 2025-06-20 RX ADMIN — LISINOPRIL 40 MG: 20 TABLET ORAL at 21:28

## 2025-06-20 RX ADMIN — Medication 4 ML: at 21:01

## 2025-06-20 RX ADMIN — TRAMADOL HYDROCHLORIDE 50 MG: 50 TABLET, COATED ORAL at 03:55

## 2025-06-20 RX ADMIN — REVEFENACIN 175 MCG: 175 SOLUTION RESPIRATORY (INHALATION) at 10:15

## 2025-06-20 RX ADMIN — CEFAZOLIN 2000 MG: 2 INJECTION, POWDER, FOR SOLUTION INTRAMUSCULAR; INTRAVENOUS at 01:09

## 2025-06-20 RX ADMIN — HYDROCODONE BITARTRATE AND ACETAMINOPHEN 2 TABLET: 5; 325 TABLET ORAL at 21:28

## 2025-06-20 RX ADMIN — ALBUTEROL SULFATE 2.5 MG: 2.5 SOLUTION RESPIRATORY (INHALATION) at 20:59

## 2025-06-20 RX ADMIN — HYDROMORPHONE HYDROCHLORIDE 0.5 MG: 1 INJECTION, SOLUTION INTRAMUSCULAR; INTRAVENOUS; SUBCUTANEOUS at 08:17

## 2025-06-20 RX ADMIN — HYDROMORPHONE HYDROCHLORIDE 0.5 MG: 1 INJECTION, SOLUTION INTRAMUSCULAR; INTRAVENOUS; SUBCUTANEOUS at 05:17

## 2025-06-20 NOTE — CASE MANAGEMENT/SOCIAL WORK
Continued Stay Note  [facility]     Patient Name: Mariana Loya  MRN: [id number]  Today's Date: 6/20/2025    Admit Date: 6/18/2025    Plan: Home with 02 through [facility]   Discharge Plan       Row Name 06/20/25 1316       Plan    Plan Home with 02 through [facility]    Plan Comments Patient completed walking 02 test. CCP met with patient at bedside; discussed home 02. Patient agreeable and did not have company preference for 02. Referral given to [facility] for Maximiliano. Patient will be staying with her friend at d/c ([address] [place] [address]). Mena VILLALTA                   Discharge Codes    No documentation.                 Expected Discharge Date and Time       Expected Discharge Date Expected Discharge Time    Jun 20, 2025               PAWAN Meeks

## 2025-06-20 NOTE — DISCHARGE PLACEMENT REQUEST
"Fatou Loya (65 y.o. Female)       Date of Birth   1960    Social Security Number       Address   [address] [place] [address]    Home Phone   [phone]    MRN   [id number]       P[employer]ec[employer]al   Hinduism    Marital Status                               Admission Date   6/18/2025    Admission Type   Emergency    Admitting Provider   Aniket Pretty MD    Attending Provider   Rito Tubbs MD    Department, Room/Bed   [address], P385/1       Discharge Date       Discharge Disposition   Home or Self Care    Discharge Destination                                 Attending Provider: Rito Tubbs MD    Allergies: Statins    Isolation: None   Infection: None   Code Status: CPR    Ht: 170.2 cm (67\")   Wt: 90 kg (198 lb 6.4 oz)    Admission Cmt: None   Principal Problem: Incarcerated hernia of abdominal cavity [K45.0]                   Active Insurance as of 6/18/2025       Primary Coverage       Payor Plan Insurance Group Employer/Plan Group    ANTHEM MEDICARE REPLACEMENT [facility] MEDICARE ADVANTAGE O [id number]       Payor Plan Address Payor Plan Phone Number Payor Plan Fax Number Effective Dates    [address] [phone]  1/1/2025 - None Entered    [facility] [address]         Subscriber Name Subscriber Birth Date Member ID       FATOU LOYA 1960 [id number]                     Emergency Contacts        (Rel.) Home Phone Work Phone Mobile Phone    MARISELA FAJARDO (Friend) -- -- [phone]                "

## 2025-06-20 NOTE — NURSING NOTE
"Patient is pleasant ao female tx to [facility]P from [facility]/p ventral hernia repair with mesh; patient on arrival very talkative.  Patient is nice enough but tends to talk over the RN with her own explanations of medical knowledge. Patient refusing Percocet and requesting Tramadol; patient requesting mucinex \"because the RN before you told me I needed it\"; patient refusing nicotine patch stating \"I have those at home. I dont need one here\".  Upon entering room to pass pm meds patients cont pulse ox was alarming that 02 sats reading 86-88%; patient was noted to have removed NC. Patient educated on need for supplemental 02; patient stated, \"I don't need it; its not like I'm gasping for air\".  Continued to explain that she does not use at home and that she was sent home with supplemental 02 after being dx with Covid \"and I used it twice\". Patient stated that last time she used with with vaseline to keep nose moist and she ended up with \"sores\" in nose; educated on burns s/t the use of 02 and petroleum based products.  Patient finally agreeable to wearing 02 so that she doesn't have to hear the alarm.  All questions and concerns addressed; no needs expressed at this time. Patient has remained free from fall and injury throughout shift; use of non-skid footwear when OOB; call light within reach. POC ongoing.    "

## 2025-06-20 NOTE — PLAN OF CARE
Problem: Adult Inpatient Plan of Care  Goal: Plan of Care Review  6/20/2025 0037 by Lisha Pride RN  Outcome: Progressing  6/20/2025 0037 by Lisha Pride RN  Outcome: Progressing  Goal: Patient-Specific Goal (Individualized)  Outcome: Progressing  Goal: Absence of Hospital-Acquired Illness or Injury  Outcome: Progressing  Intervention: Identify and Manage Fall Risk  Recent Flowsheet Documentation  Taken 6/19/2025 2200 by Lisha Pride RN  Safety Promotion/Fall Prevention:   assistive device/personal items within reach   clutter free environment maintained   fall prevention program maintained   lighting adjusted   mobility aid in reach   room organization consistent   safety round/check completed  Intervention: Prevent Skin Injury  Recent Flowsheet Documentation  Taken 6/19/2025 2200 by Lisha Pride RN  Body Position: position changed independently  Skin Protection: incontinence pads utilized  Intervention: Prevent and Manage VTE (Venous Thromboembolism) Risk  Recent Flowsheet Documentation  Taken 6/19/2025 2200 by Lisha Pride RN  VTE Prevention/Management:   bilateral   SCDs (sequential compression devices) on  Intervention: Prevent Infection  Recent Flowsheet Documentation  Taken 6/19/2025 2200 by Lisha Pride RN  Infection Prevention:   hand hygiene promoted   environmental surveillance performed   rest/sleep promoted   single patient room provided  Goal: Optimal Comfort and Wellbeing  Outcome: Progressing  Intervention: Provide Person-Centered Care  Recent Flowsheet Documentation  Taken 6/19/2025 2200 by Lisha Pride RN  Trust Relationship/Rapport:   care explained   choices provided   questions answered   questions encouraged   thoughts/feelings acknowledged  Goal: Readiness for Transition of Care  Outcome: Progressing     Problem: Pain Acute  Goal: Optimal Pain Control and Function  Outcome: Progressing  Intervention: Optimize Psychosocial Wellbeing  Recent Flowsheet Documentation  Taken  6/19/2025 2200 by Lisha Pride RN  Supportive Measures:   active listening utilized   positive reinforcement provided  Intervention: Prevent or Manage Pain  Recent Flowsheet Documentation  Taken 6/19/2025 2200 by Lisha Pride RN  Sensory Stimulation Regulation: care clustered  Bowel Elimination Promotion:   ambulation promoted   adequate fluid intake promoted  Sleep/Rest Enhancement:   awakenings minimized   noise level reduced   regular sleep/rest pattern promoted  Medication Review/Management: medications reviewed     Problem: Comorbidity Management  Goal: Blood Pressure in Desired Range  Outcome: Progressing  Intervention: Maintain Blood Pressure Management  Recent Flowsheet Documentation  Taken 6/19/2025 2200 by Lisha Pride RN  Medication Review/Management: medications reviewed   Goal Outcome Evaluation:

## 2025-06-20 NOTE — CASE MANAGEMENT/SOCIAL WORK
Continued Stay Note  [facility]     Patient Name: Mariana Loya  MRN: [id number]  Today's Date: 6/20/2025    Admit Date: 6/18/2025    Plan: Home with 02 through [facility]   Discharge Plan       Row Name 06/20/25 1401       Plan    Plan Home with 02 through [facility]    Plan Comments DC canceled. Plans to consult pulmonary. CCP updated Maximiliano/[facility]. Mena VILLALTA      Row Name 06/20/25 1316       Plan    Plan Home with 02 through [facility]    Plan Comments Patient completed walking 02 test. CCP met with patient at bedside; discussed home 02. Patient agreeable and did not have company preference for 02. Referral given to [facility]/LVM for Maximiliano. Patient will be staying with her friend at d/c ([address] [place] [address]). Mena VILLALTA                   Discharge Codes    No documentation.                 Expected Discharge Date and Time       Expected Discharge Date Expected Discharge Time    Jun 20, 2025               PAWAN Meeks

## 2025-06-20 NOTE — PLAN OF CARE
Goal Outcome Evaluation:  Plan of Care Reviewed With: patient         POD 1 unable to get off 2L oxygen and failed waking oximetry test, discharge orders discontinued, pulmonology consulted.          Problem: Adult Inpatient Plan of Care  Goal: Absence of Hospital-Acquired Illness or Injury  Intervention: Prevent and Manage VTE (Venous Thromboembolism) Risk    Recent Flowsheet Documentation  Taken 6/20/2025 0815 by Marguerite Alves RN  VTE Prevention/Management:   SCDs (sequential compression devices) on   bilateral       Problem: Adult Inpatient Plan of Care  Goal: Absence of Hospital-Acquired Illness or Injury  Intervention: Identify and Manage Fall Risk    Recent Flowsheet Documentation  Taken 6/20/2025 1210 by Marguerite Alves RN  Safety Promotion/Fall Prevention:   activity supervised   safety round/check completed  Taken 6/20/2025 1015 by Marguerite Alves RN  Safety Promotion/Fall Prevention:   activity supervised   safety round/check completed  Taken 6/20/2025 0815 by Marguerite Alves RN  Safety Promotion/Fall Prevention:   activity supervised   safety round/check completed              Problem: Adult Inpatient Plan of Care  Goal: Absence of Hospital-Acquired Illness or Injury  Intervention: Prevent Infection    Recent Flowsheet Documentation  Taken 6/20/2025 1210 by Marguerite Alves RN  Infection Prevention: hand hygiene promoted  Taken 6/20/2025 1015 by Marguerite Alves RN  Infection Prevention: hand hygiene promoted  Taken 6/20/2025 0815 by Marguerite Alves RN  Infection Prevention: hand hygiene promoted

## 2025-06-20 NOTE — CASE MANAGEMENT/SOCIAL WORK
Discharge Planning Assessment  [facility]     Patient Name: Mariana Loya  MRN: [id number]  Today's Date: 6/20/2025    Admit Date: 6/18/2025    Plan: Home with friend   Discharge Needs Assessment       Row Name 06/20/25 0839       Living Environment    People in Home alone    Current Living Arrangements home    Potentially Unsafe Housing Conditions none    Primary Care Provided by self    Provides Primary Care For no one    Family Caregiver if Needed friend(s)    Quality of Family Relationships involved;helpful;supportive    Able to Return to Prior Arrangements yes       Resource/Environmental Concerns    Resource/Environmental Concerns none    Transportation Concerns none       Transition Planning    Patient/Family Anticipates Transition to home with family    Patient/Family Anticipated Services at Transition none    Transportation Anticipated family or friend will provide       Discharge Needs Assessment    Readmission Within the Last 30 Days no previous admission in last 30 days    Equipment Currently Used at Home nebulizer    Concerns to be Addressed denies needs/concerns at this time;no discharge needs identified    Equipment Needed After Discharge none                   Discharge Plan       Row Name 06/20/25 0839       Plan    Plan Home with friend    Plan Comments  CCP met with patient at bedside. CCP role explained and face sheet verified. Patient's APRN is Brigitte Alvarado. Patient lives alone. Patient states she will be staying with a friend at d/c. Patient is independent with ADLs and does not use DME. Patient has nebulizer at home. Patient has not used HH/SNF. Patient inquired about financial assistance regarding hospital stay. [facility] provided patient with [employer] Application. Patient also requesting MD letter to return to work. [facility] messaged MD and was informed patient can return to work in about two weeks. [facility] created letter and provided to patient. RN to work on weaning patient's  02. CCP will follow for 02 needs. Patient's friend to transport at d/c. Mena VILLALTA                     Expected Discharge Date and Time       Expected Discharge Date Expected Discharge Time    Jun 20, 2025            Demographic Summary       Row Name 06/20/25 0838       General Information    Admission Type observation    Arrived From emergency department    Referral Source admission list    Reason for Consult discharge planning    Preferred Language English                   Functional Status       Row Name 06/20/25 0838       Functional Status    Usual Activity Tolerance good    Current Activity Tolerance good       Functional Status, IADL    Medications independent    Meal Preparation independent    Housekeeping independent    Laundry independent    Shopping independent       Mental Status    General Appearance WDL WDL       Mental Status Summary    Recent Changes in Mental Status/Cognitive Functioning no changes                   Psychosocial    No documentation.                  Abuse/Neglect    No documentation.                  Legal    No documentation.                  Substance Abuse    No documentation.                  Patient Forms    No documentation.                     PAWAN Meeks

## 2025-06-20 NOTE — THERAPY EVALUATION
Exercise Oximetry    Patient Name:Mariana Loya   MRN: [id number]   Date: 06/20/25             ROOM AIR BASELINE   SpO2% 85   Heart Rate 82   Blood Pressure      EXERCISE ON ROOM AIR SpO2% EXERCISE ON O2 @ 2LPM SpO2%   1 MINUTE  1 MINUTE 95   2 MINUTES  2 MINUTES 92   3 MINUTES  3 MINUTES 93   4 MINUTES  4 MINUTES 95   5 MINUTES  5 MINUTES 95   6 MINUTES  6 MINUTES 94              Distance Walked   Distance Walked   Dyspnea (Rashawn Scale)   Dyspnea (Rashawn Scale)   Fatigue (Rashawn Scale)   Fatigue (Rashawn Scale)   SpO2% Post Exercise   SpO2% Post Exercise 94   HR Post Exercise   HR Post Exercise 71   Time to Recovery   Time to Recovery     Comments: Nurse tried pt on room air. According to nurse, pt's o2 dropped to 85%. Placed pt on 2L.  Walked pt around 3 park. Pt went a full lap around. Sats stayed in low to mid 90s. Pt said she felt good during the walk. No soa or distress was seen.

## 2025-06-20 NOTE — CONSULTS
Patient Care Team:  Brigitte Alvarado APRN as PCP - General (Nurse Practitioner)      Subjective     Patient is a 65 y.o. female.  Seeing for failed oximetry test now requiring oxygen.  Patient was admitted on 618 with incarcerated incisional hernia infraumbilical with partially obstructed small bowel prior second episode of this therefore she underwent incisional hernia repair with mesh and 4 cm fascial defect closure on 6/19/2025.  Patient reports she has been smoking since the age of 11 she is recently down to about three quarters of a pack a day she was usually a pack-a-day smoker.  She does not get short of breath with exertion even climbing stairs or carrying items.  She occasionally has a little bit of cough with feel like is some secretions in her throat..  She has been diagnosed with COPD and she has been prescribed an inhaler she was first prescribed 1 a few years ago but they were too costly so she did not use them.  She has a history of a DVT which she believes was related to a COVID booster.  Particularly feel short of breath now she is on 2 L O2 not having any chest pain and her abdomen is sore.  Her abdomen was quite sore very send is severely painful when she came in.  No chest pain or palpitations she does not currently have any nausea vomiting or diarrhea.      Review of Systems:  No polyuria polydipsia heat or cold intolerance no easy bleeding bruising.  No dysuria hematuria any urgency or frequency.  Not known to snore does not awaken herself up with snoring gasping runs or shortness of breath feels rested after night sleep no hypersomnolence no history of sleep apnea      History  Past Medical History:   Diagnosis Date    Colon polyps 10/07/2013    Ascending colon biopsy: fragments of tubular adenoma with low grade dysplasia    Diverticulitis of colon     Diverticulosis 10/07/2013    Found in Colonoscopy done by Dr. Eddi Anne    DVT (deep venous thrombosis)      Hyperlipidemia     Hypertension Lipitor 30 mg daily    Superficial thrombosis of leg, left      Past Surgical History:   Procedure Laterality Date    CHOLECYSTECTOMY      COLONOSCOPY      Dr. Anne    COLONOSCOPY N/A 04/15/2019    Procedure: COLONOSCOPY into cecum and TI with hot snare polypectomy with 10  cc saline lift;  Surgeon: Eddi Anne MD;  Location: [facility] ENDOSCOPY;  Service: Gastroenterology    COLONOSCOPY W/ POLYPECTOMY N/A 10/07/2013    One 10 mm polyp in the proximal ascending colon: resected and retrieved, diverticulosis in the sigmoid colon and in the descending colon-Dr. Eddi Anne     EYE SURGERY  Renal tear laser surgery    HERNIA REPAIR      KNEE ARTHROSCOPY W/ MENISCECTOMY Left 11/17/2006    Left knee arthroscopy with complete lateral meniscectomy-Dr. Ming Thorpe    LAPAROSCOPIC CHOLECYSTECTOMY W/ CHOLANGIOGRAPHY N/A 11/14/2001    Dr. Lucy Levi    TONSILLECTOMY AND ADENOIDECTOMY N/A     VENTRAL/INCISIONAL HERNIA REPAIR N/A 09/08/2018    Procedure: incarcerated /INCISIONAL HERNIA REPAIR with mesh;  Surgeon: Kody Diaz MD;  Location: [facility] MAIN OR;  Service: General    VENTRAL/INCISIONAL HERNIA REPAIR N/A 6/19/2025    Procedure: VENTRAL/INCISIONAL HERNIA REPAIR;  Surgeon: Rito Tubbs MD;  Location:  [facility] MAIN OR;  Service: General;  Laterality: N/A;    WISDOM TOOTH EXTRACTION N/A 1980    Dr. Wong     Social History     Socioeconomic History    Marital status:      Spouse name: Orlando   Tobacco Use    Smoking status: Every Day     Current packs/day: 1.00     Average packs/day: 1 pack/day for 50.0 years (50.0 ttl pk-yrs)     Types: Cigarettes    Smokeless tobacco: Never   Vaping Use    Vaping status: Never Used   Substance and Sexual Activity    Alcohol use: No    Drug use: No    Sexual activity: Defer     Family History   Problem Relation Age of Onset    Diabetes Mother     Heart disease Mother         chf    Arthritis Mother     Hypertension Mother      Heart disease Father     Early death Father         Heart Attack    No Known Problems Sister     No Known Problems Brother     No Known Problems Daughter     No Known Problems Son     No Known Problems Maternal Aunt     No Known Problems Paternal Aunt     No Known Problems Maternal Grandmother     No Known Problems Paternal Grandmother     BRCA 1/2 Neg Hx     Breast cancer Neg Hx     Colon cancer Neg Hx     Endometrial cancer Neg Hx     Ovarian cancer Neg Hx     Malig Hyperthermia Neg Hx          Allergies:  Statins    Medications:  Prior to Admission medications    Medication Sig Start Date End Date Taking? Authorizing Provider   amLODIPine (NORVASC) 5 MG tablet Take 1 tablet by mouth Daily. 5/14/25 5/14/26 Yes Hina Castellanos MD   dicyclomine (BENTYL) 20 MG tablet Take 1 tablet by mouth As Needed for Abdominal Cramping. 5/26/25  Yes Hina Castellanos MD   lisinopril (PRINIVIL,ZESTRIL) 40 MG tablet Take 1 tablet by mouth Every Night.   Yes Hina Castellanos MD   triamcinolone (KENALOG) 0.1 % ointment Apply  topically to the appropriate area as directed 2 (Two) Times a Day. 5/14/25  Yes Hina Castellanos MD   HYDROcodone-acetaminophen (NORCO) 5-325 MG per tablet Take 1 tablet by mouth Every 4 (Four) Hours As Needed for Moderate Pain or Severe Pain. 6/20/25   Rito Tubbs MD   lisinopril (PRINIVIL,ZESTRIL) 30 MG tablet Take 1 tablet by mouth Daily.  Patient not taking: Reported on 6/18/2025 9/28/22   Hina Castellanos MD   ondansetron (Zofran) 4 MG tablet Take 1 tablet by mouth Every 6 (Six) Hours As Needed for Nausea or Vomiting for up to 10 doses. 6/20/25   Rito Tubsb MD   tiotropium bromide monohydrate (SPIRIVA RESPIMAT) 2.5 MCG/ACT aerosol solution inhaler Inhale 2 puffs by mouth Daily for 30 days. 12/3/22 1/2/23  Grzegorz Palafox MD     amLODIPine, 5 mg, Oral, Daily  lisinopril, 40 mg, Oral, Nightly  nicotine, 1 patch, Transdermal, Q24H  revefenacin, 175 mcg, Nebulization,  "Daily - RT           Objective     Vital Signs  Vital Sign Min/Max for last 24 hours  Temp  Min: 97.5 °F (36.4 °C)  Max: 99.3 °F (37.4 °C)   BP  Min: 115/95  Max: 165/72   Pulse  Min: 62  Max: 101   Resp  Min: 15  Max: 22   SpO2  Min: 90 %  Max: 100 %   Flow (L/min) (Oxygen Therapy)  Min: 1  Max: 5   No data recorded       Intake/Output Summary (Last 24 hours) at 6/20/2025 1516  Last data filed at 6/20/2025 1333  Gross per 24 hour   Intake 1040 ml   Output --   Net 1040 ml     I/O this shift:  In: 480 [P.O.:480]  Out: -   Last Weight and Admission Weight        06/18/25 2235   Weight: 90 kg (198 lb 6.4 oz)     Flowsheet Rows      Flowsheet Row First Filed Value   Admission Height 170.2 cm (67\") Documented at 06/18/2025 2235   Admission Weight 90 kg (198 lb 6.4 oz) Documented at 06/18/2025 2235            Body mass index is 31.07 kg/m².           Physical Exam:  General Appearance: Well-developed obese white female she is resting in bed she is on 2 L nasal cannula O2 oxygen saturations 98%  Eyes: Conjunctiva are clear and anicteric  ENT: Mucous membranes are moist no erythema no exudates she has a Mallampati type III airway  Neck: Palpable adenopathy or thyromegaly no jugular venous tension and trachea midline  Lungs: She has a few little crackles in the bases when she takes a deep breath she is definitely splinting and not taking good deep breaths.  No wheezes  Cardiac: Regular rate rhythm no murmur  Abdomen: Soft nontender no palpable hepatosplenomegaly or masses  : Not examined  Musculoskeletal: Grossly normal  Skin: Warm and dry no jaundice no petechiae  Neuro: She is alert and oriented cooperative following commands and grossly intact  Extremities/P Vascular: No cyanosis no edema no palpable lower extremity cords no pain with dorsiflexion of the feet  MSE: She is really concerned about her medical bills      Labs:  Results from last 7 days   Lab Units 06/20/25  0631 06/19/25  0354 06/18/25  1920   GLUCOSE " mg/dL 183* 136* 142*   SODIUM mmol/L 137 136 136   POTASSIUM mmol/L 4.1 3.5 3.3*   CO2 mmol/L 25.0 22.0 23.0   CHLORIDE mmol/L 103 104 102   ANION GAP mmol/L 9.0 10.0 11.0   CREATININE mg/dL 0.57 0.62 0.60   BUN mg/dL 8.0 8.0 7.0*   BUN / CREAT RATIO  14.0 12.9 11.7   CALCIUM mg/dL 8.3* 8.5* 8.8   ALK PHOS U/L  --   --  87   TOTAL PROTEIN g/dL  --   --  6.7   ALT (SGPT) U/L  --   --  12   AST (SGOT) U/L  --   --  13   BILIRUBIN mg/dL  --   --  0.5   ALBUMIN g/dL  --   --  3.4*   GLOBULIN gm/dL  --   --  3.3     Estimated Creatinine Clearance: 111.9 mL/min (by C-G formula based on SCr of 0.57 mg/dL).      Results from last 7 days   Lab Units 06/20/25  0631 06/19/25  0354 06/18/25  1920   WBC 10*3/mm3 7.94 5.10 5.79   RBC 10*6/mm3 4.18 3.98 4.23   HEMOGLOBIN g/dL 12.9 12.1 13.1   HEMATOCRIT % 37.6 36.1 37.9   MCV fL 90.0 90.7 89.6   MCH pg 30.9 30.4 31.0   MCHC g/dL 34.3 33.5 34.6   RDW % 12.4 12.3 12.5   RDW-SD fl 40.6 40.6 39.9   MPV fL 10.7 10.8 10.3   PLATELETS 10*3/mm3 203 203 210   NEUTROPHIL % % 80.1*  --  55.2   LYMPHOCYTE % % 10.7*  --  31.4   MONOCYTES % % 8.6  --  11.2   EOSINOPHIL % % 0.0*  --  1.6   BASOPHIL % % 0.1  --  0.3   IMM GRAN % % 0.5  --  0.3   NEUTROS ABS 10*3/mm3 6.36  --  3.19   LYMPHS ABS 10*3/mm3 0.85  --  1.82   MONOS ABS 10*3/mm3 0.68  --  0.65   EOS ABS 10*3/mm3 0.00  --  0.09   BASOS ABS 10*3/mm3 0.01  --  0.02   IMMATURE GRANS (ABS) 10*3/mm3 0.04  --  0.02   NRBC /100 WBC 0.0  --  0.0                             Microbiology Results (last 10 days)       ** No results found for the last 240 hours. **              Diagnostics:  CT Abdomen Pelvis With Contrast  Result Date: 6/18/2025  CT ABDOMEN AND PELVIS WITH IV CONTRAST  HISTORY: Abdominal pain  TECHNIQUE: Radiation dose reduction techniques were utilized, including automated exposure control and exposure modulation based on body size. Axial images were obtained through the abdomen and pelvis after the administration of IV contrast.  Coronal and sagittal reformatted images obtained.  COMPARISON: 9/8/2018  FINDINGS:  ABDOMEN: Mild atelectasis/scarring in the lung bases. There is a small hiatal hernia. There is a stable small hemangioma in the left lobe of the liver. Cholecystectomy. The spleen is normal in size. The right kidney is small with respect to the left kidney. The kidneys are otherwise unremarkable. The adrenal glands are unremarkable. The pancreas is unremarkable. There is a small fat-containing upper abdominal wall hernia again demonstrated. There is a midline hernia present located just below the umbilicus. There is loop of small bowel contained within the hernia sac and there is fluid in the hernia sac surrounding the small bowel and some thickening of the bowel loop within the hernia sac. The small bowel loop proximal to the hernia sac is dilated and fluid-filled. Overall the CT appearance is concerning for possible incarcerated ventral hernia with at least partial obstruction. Correlate with physical exam findings to see if the hernia is reducible.  PELVIS: The bladder is unremarkable. There is a right adnexal cyst again demonstrated measuring about 2.7 cm not significantly changed. There is no free fluid in the pelvis. Colonic diverticulosis.      1. There is a midline hernia present just below the umbilicus. There is a loop of small bowel within the hernia sac with surrounding fluid in the hernia sac and some thickening of the bowel loop. The small bowel loop proximal to the hernia sac is dilated and fluid-filled. Overall the CT appearance is concerning for possible incarcerated ventral hernia with a least partial obstruction. Correlate with physical exam findings to see if the hernia is reducible. Surgical consultation may be needed 2. Additional findings as described  Radiation dose reduction techniques were utilized, including automated exposure control and exposure modulation based on body size.   This report was finalized  on 2025 8:42 PM by Dr. Eddi Barrientos M.D on Workstation: [id number]      CT Abdomen Pelvis With Contrast  Result Date: 2025  REVIEWING YOUR TEST RESULTS IN [facility] IS NOT A SUBSTITUTE FOR DISCUSSING THOSE RESULTS WITH YOUR HEALTH CARE PROVIDER. PLEASE CONTACT YOUR PROVIDER VIA [facility] TO DISCUSS ANY QUESTIONS OR CONCERNS YOU MAY HAVE REGARDING THESE TEST RESULTS.  RADIOLOGY REPORT FACILITY:  [facility] UNIT/AGE/GENDER: JULIANA  ER      AGE:65 Y          SEX:F PATIENT NAME/:  FATOU SOFIA    1960 UNIT NUMBER:  [id number] ACCOUNT NUMBER:  [id number] ACCESSION NUMBER:  [id number]   EXAMINATION: CT ABDOMEN AND PELVIS WITH CONTRAST                            DATE:  2025 HISTORY:Left lower quadrant abdominal pain and diarrhea TECHNIQUE:   Helical CT of the abdomen and pelvis was performed following the administration of oral contrast and during an uneventful injection of 100 mL of opitray 300 nonionic contrast media.   CT scans at this facility use dose modulation, iterative reconstruction and weight based dosing to reduce radiation dose to as low as reasonably achievable COMPARISON: FINDINGS: The lung bases are clear. Again seen is a approximately 1 cm left lobe liver hemangioma, grossly stable. The liver is otherwise unremarkable. The gallbladder surgically absent. There is significant common bile duct dilatation measuring up to 2 cm in diameter without centrally obstructing abnormality, stable in appearance compared to the prior exam. There is mild pancreatic ductal dilatation, in the pancreatic head, grossly stable. The spleen is unremarkable. The adrenal glands are unremarkable. The kidneys are unremarkable. There is a upper abdominal ventral hernia containing fat only. There is a mid to lower abdominal ventral hernia to the right of midline containing loops of small bowel. Small bowel loops are mildly distended and fluid-filled however there is  no definite evidence for acute small bowel obstruction. There is extensive colonic diverticulosis with mild pericolonic inflammatory change involving the distal descending/proximal sigmoid colon. Mild acute diverticulitis cannot be excluded.. There is no free fluid or air. There is no focal fluid collection or abscess. There is diffuse calcific atherosclerotic disease with evidence for moderate right common iliac artery stenosis, unchanged. Again seen is a 3 x 2.5 cm right ovarian cyst, grossly stable. Degenerative changes are seen in the lumbar spine. IMPRESSION: 1.   Upper abdominal ventral hernia containing fat only 2.  Mid to lower abdominal ventral hernia to the right midline containing loops of small bowel. Small bowel loops are mildly distended and fluid-filled however there is no definite evidence for acute small bowel obstruction. 3.  Extensive colonic diverticulosis with mild pericolonic inflammatory change involving the distal descending/proximal sigmoid colon. Mild acute diverticulitis cannot be excluded 4.  Stable 3 x 2.5 cm right ovarian cyst. 5.  Additional chronic findings above. Dictated by: Aron Garcia M.D. Images and Report reviewed and interpreted by: Aron Garcia M.D. <PS><Electronically signed by: Aron Garcia M.D.> 05/26/2025 1114 D: 05/26/2025 1101 T: 05/26/2025 1101    Results for orders placed during the hospital encounter of 11/24/22    Adult Transthoracic Echo Complete W/ Cont if Necessary Per Protocol    Interpretation Summary    Left ventricular systolic function is normal. Calculated left ventricular EF = 54.4%    Left ventricular wall thickness is consistent with mild concentric hypertrophy.    Left ventricular diastolic function was normal.    There is calcification of the aortic valve.      Patient had an exercise oximetry today on room air she was satting 85% on 2 L she was 95% and stayed in that mid 90 range for 6-minute walk on 2 L.    Assessment & Plan     Postop acute  hypoxic respiratory failure her O2 requirements have fallen dramatically already.  She had preoperative bibasilar atelectasis on her CT abdomen.  Suspect that she has significant atelectasis just based on exam.  This is on some probable underlying COPD I would be shocked if she did not have COPD with her smoking history does not sound like she is ever had formal pulmonary functions to diagnose but she has been given a clinical diagnosis in the past.  Will get really aggressive with pulmonary hygiene expectorants and bronchodilators see if we can get her cleared out quickly.  Pulmonary embolism is fairly unlikely at this point in time preoperatively she was on room air she came out on high flow and she is weaned down dramatically on her O2 requirements  Incarcerated incisional hernia status post open incisional hernia repair with mesh on 6/19/2025  Tobacco abuse greater than 50-year smoking history she absolutely needs to stop smoking discussed with her no commitment  Suspected COPD needs pulmonary functions when she has recovered from this      Dino Crocker Jr, MD  06/20/25  15:16 EDT    Time:

## 2025-06-20 NOTE — DISCHARGE SUMMARY
DATE OF ADMIT:    6/18/2025    DATE OF DISCHARGE:    6/20/2025    DIAGNOSIS:    Incarcerated incisional hernia    PROCEDURES:    Open incisional hernia repair with mesh, 4 cm fascial defect, 6/19/2025    RADIOGRAPHIC STUDIES SUMMARY:  CT abdomen pelvis 6/19/2025: Midline hernia present just below the umbilicus with loop of small bowel and surrounding fluid with thickening of bowel loop and proximal bowel dilation consistent with incarcerated ventral hernia and partial obstruction.     LABORATORY SUMMARY:  CBC normal  BMP with glucose of 136 and otherwise normal    SUMMARY OF HOSPITAL COURSE:     Admitted from emergency room secondary to incarcerated incisional hernia with resultant partial small bowel obstruction.  Underwent surgical intervention as outlined above.  Uneventful postoperative course.  By date of discharge pain was well-controlled, incision was healing well without evidence of infection and she had remained afebrile with stable vital signs.    DIET:  Regular    ACTIVITY:  Walking encouraged, no lifting or strenuous activity    MEDICATIONS:  No changes were made to preadmission medication list  Prescriptions given for:  Norco 5/325, #10  Zofran 4 mg, #10    FOLLOW-UP:   To call office and schedule 1 week follow-up appointment    Rito Tubbs M.D.

## 2025-06-21 ENCOUNTER — APPOINTMENT (OUTPATIENT)
Dept: CT IMAGING | Facility: HOSPITAL | Age: 65
End: 2025-06-21
Payer: MEDICARE

## 2025-06-21 VITALS
SYSTOLIC BLOOD PRESSURE: 129 MMHG | HEART RATE: 65 BPM | TEMPERATURE: 97.5 F | DIASTOLIC BLOOD PRESSURE: 64 MMHG | OXYGEN SATURATION: 91 % | BODY MASS INDEX: 31.14 KG/M2 | WEIGHT: 198.4 LBS | RESPIRATION RATE: 16 BRPM | HEIGHT: 67 IN

## 2025-06-21 PROCEDURE — 94799 UNLISTED PULMONARY SVC/PX: CPT

## 2025-06-21 PROCEDURE — 94664 DEMO&/EVAL PT USE INHALER: CPT

## 2025-06-21 PROCEDURE — 25510000001 IOPAMIDOL PER 1 ML: Performed by: SURGERY

## 2025-06-21 PROCEDURE — G0378 HOSPITAL OBSERVATION PER HR: HCPCS

## 2025-06-21 PROCEDURE — 71275 CT ANGIOGRAPHY CHEST: CPT

## 2025-06-21 PROCEDURE — 94618 PULMONARY STRESS TESTING: CPT

## 2025-06-21 PROCEDURE — 63710000001 REVEFENACIN 175 MCG/3ML SOLUTION: Performed by: SURGERY

## 2025-06-21 PROCEDURE — 94761 N-INVAS EAR/PLS OXIMETRY MLT: CPT

## 2025-06-21 PROCEDURE — 94760 N-INVAS EAR/PLS OXIMETRY 1: CPT

## 2025-06-21 RX ORDER — IOPAMIDOL 755 MG/ML
100 INJECTION, SOLUTION INTRAVASCULAR
Status: COMPLETED | OUTPATIENT
Start: 2025-06-21 | End: 2025-06-21

## 2025-06-21 RX ORDER — SODIUM PHOSPHATE, DIBASIC AND SODIUM PHOSPHATE, MONOBASIC 3.5; 9.5 G/66ML; G/66ML
1 ENEMA RECTAL ONCE
Status: DISCONTINUED | OUTPATIENT
Start: 2025-06-21 | End: 2025-06-21 | Stop reason: HOSPADM

## 2025-06-21 RX ORDER — GUAIFENESIN 600 MG/1
1200 TABLET, EXTENDED RELEASE ORAL EVERY 12 HOURS SCHEDULED
Qty: 120 TABLET | Refills: 0 | Status: SHIPPED | OUTPATIENT
Start: 2025-06-21

## 2025-06-21 RX ORDER — ALBUTEROL SULFATE 0.83 MG/ML
2.5 SOLUTION RESPIRATORY (INHALATION)
Qty: 180 ML | Refills: 0 | Status: SHIPPED | OUTPATIENT
Start: 2025-06-21

## 2025-06-21 RX ORDER — DOCUSATE SODIUM 100 MG/1
100 CAPSULE, LIQUID FILLED ORAL ONCE
Status: COMPLETED | OUTPATIENT
Start: 2025-06-21 | End: 2025-06-21

## 2025-06-21 RX ADMIN — Medication 4 ML: at 07:15

## 2025-06-21 RX ADMIN — IOPAMIDOL 95 ML: 755 INJECTION, SOLUTION INTRAVENOUS at 10:14

## 2025-06-21 RX ADMIN — DOCUSATE SODIUM 100 MG: 100 CAPSULE, LIQUID FILLED ORAL at 15:29

## 2025-06-21 RX ADMIN — AMLODIPINE BESYLATE 5 MG: 5 TABLET ORAL at 08:04

## 2025-06-21 RX ADMIN — REVEFENACIN 175 MCG: 175 SOLUTION RESPIRATORY (INHALATION) at 07:14

## 2025-06-21 RX ADMIN — GUAIFENESIN 1200 MG: 600 TABLET, MULTILAYER, EXTENDED RELEASE ORAL at 08:04

## 2025-06-21 RX ADMIN — ALBUTEROL SULFATE 2.5 MG: 2.5 SOLUTION RESPIRATORY (INHALATION) at 10:53

## 2025-06-21 RX ADMIN — ALBUTEROL SULFATE 2.5 MG: 2.5 SOLUTION RESPIRATORY (INHALATION) at 07:12

## 2025-06-21 RX ADMIN — HYDROCODONE BITARTRATE AND ACETAMINOPHEN 2 TABLET: 5; 325 TABLET ORAL at 03:30

## 2025-06-21 NOTE — DISCHARGE INSTR - APPOINTMENTS
Please call Dr. Tubbs office on Monday to schedule follow up for Thursday 6/26/2025. [phone]    Please make a follow up appointment with your primary care doctor within a week regarding your CT results

## 2025-06-21 NOTE — PROGRESS NOTES
Exercise Oximetry    Patient Name:Mariana Loya   MRN: [id number]   Date: 06/21/25             ROOM AIR BASELINE   SpO2% 92%   Heart Rate 93   Blood Pressure      EXERCISE ON ROOM AIR SpO2% EXERCISE ON O2 @  LPM SpO2%   1 MINUTE 92 1 MINUTE    2 MINUTES 92 2 MINUTES    3 MINUTES 93 3 MINUTES    4 MINUTES 93 4 MINUTES    5 MINUTES 92 5 MINUTES    6 MINUTES 92 6 MINUTES               Distance Walked   Distance Walked   Dyspnea (Rashawn Scale)   Dyspnea (Rashawn Scale)   Fatigue (Rashawn Scale)   Fatigue (Rashawn Scale)   SpO2% Post Exercise  93% SpO2% Post Exercise   HR Post Exercise  94 HR Post Exercise   Time to Recovery  2 minutes Time to Recovery     Comments: At rest on room air, Sat 92% and HR 93. During the whole six minutes exercise, Sat 92-93%, and . No sign of respiratory distress during exercise. Post walking oximetry, Sat 93% and HR 94. RT may not recommend oxygen for home.

## 2025-06-21 NOTE — PROGRESS NOTES
From the standpoint of her hernia she is doing fine.  The incision has healed well and there are no apparent direct operative issues.  From the standpoint of her lungs she was seen by pulmonary consult yesterday and CT chest was obtained today.  She has no pulmonary embolism.  She has emphysema.  There is an enhancing mass seen in segment 8 of the right hepatic lobe that is incompletely characterized though likely present on prior CT.  Dedicated liver MRI is suggested.  This can be arranged as an outpatient.  Home when cleared by pulmonary.

## 2025-06-21 NOTE — PROGRESS NOTES
LOS: 0 days   Patient Care Team:  Brigitte Alvarado APRN as PCP - General (Nurse Practitioner)    Subjective     Patient has no feeling of shortness of breath she is on 2 L O2 she has been doing breathing treatments not had much in the way of cough.  Patient told me she is very adamant still she will not go home on supplemental O2 she cannot afford it.  She was saturating well on 2 L we took her off O2 will see how she does on room air.  No chest pain abdomen still little sore from surgery    Review of Systems:          Objective     Vital Signs  Vital Sign Min/Max for last 24 hours  Temp  Min: 97.5 °F (36.4 °C)  Max: 97.9 °F (36.6 °C)   BP  Min: 127/68  Max: 178/70   Pulse  Min: 62  Max: 84   Resp  Min: 16  Max: 20   SpO2  Min: 90 %  Max: 99 %   Flow (L/min) (Oxygen Therapy)  Min: 1  Max: 2   No data recorded        Ventilator/Non-Invasive Ventilation Settings (From admission, onward)      None                         Body mass index is 31.07 kg/m².  I/O last 3 completed shifts:  In: 1250 [P.O.:1250]  Out: -   No intake/output data recorded.        Physical Exam:    General Appearance: Well-developed obese white female she is resting in bed she is on 2 L nasal cannula O2 oxygen saturations 98%  Eyes: Conjunctiva are clear and anicteric  ENT: Mucous membranes are moist no erythema no exudates she has a Mallampati type III airway  Neck: Palpable adenopathy or thyromegaly no jugular venous tension and trachea midline  Lungs: She has a few little crackles in the bases when she takes a deep breath she is definitely splinting and not taking good deep breaths.  No wheezes  Cardiac: Regular rate rhythm no murmur  Abdomen: Soft nontender no palpable hepatosplenomegaly or masses  : Not examined  Musculoskeletal: Grossly normal  Skin: Warm and dry no jaundice no petechiae  Neuro: She is alert and oriented cooperative following commands and grossly intact  Extremities/P Vascular: No cyanosis no edema no palpable  lower extremity cords no pain with dorsiflexion of the feet  MSE: She is really concerned about her medical bills   Exam Is not much change from yesterday  Labs:  Results from last 7 days   Lab Units 06/20/25  0631 06/19/25 0354 06/18/25 1920   GLUCOSE mg/dL 183* 136* 142*   SODIUM mmol/L 137 136 136   POTASSIUM mmol/L 4.1 3.5 3.3*   CO2 mmol/L 25.0 22.0 23.0   CHLORIDE mmol/L 103 104 102   ANION GAP mmol/L 9.0 10.0 11.0   CREATININE mg/dL 0.57 0.62 0.60   BUN mg/dL 8.0 8.0 7.0*   BUN / CREAT RATIO  14.0 12.9 11.7   CALCIUM mg/dL 8.3* 8.5* 8.8   ALK PHOS U/L  --   --  87   TOTAL PROTEIN g/dL  --   --  6.7   ALT (SGPT) U/L  --   --  12   AST (SGOT) U/L  --   --  13   BILIRUBIN mg/dL  --   --  0.5   ALBUMIN g/dL  --   --  3.4*   GLOBULIN gm/dL  --   --  3.3     Estimated Creatinine Clearance: 111.9 mL/min (by C-G formula based on SCr of 0.57 mg/dL).      Results from last 7 days   Lab Units 06/20/25 0631 06/19/25 0354 06/18/25 1920   WBC 10*3/mm3 7.94 5.10 5.79   RBC 10*6/mm3 4.18 3.98 4.23   HEMOGLOBIN g/dL 12.9 12.1 13.1   HEMATOCRIT % 37.6 36.1 37.9   MCV fL 90.0 90.7 89.6   MCH pg 30.9 30.4 31.0   MCHC g/dL 34.3 33.5 34.6   RDW % 12.4 12.3 12.5   RDW-SD fl 40.6 40.6 39.9   MPV fL 10.7 10.8 10.3   PLATELETS 10*3/mm3 203 203 210   NEUTROPHIL % % 80.1*  --  55.2   LYMPHOCYTE % % 10.7*  --  31.4   MONOCYTES % % 8.6  --  11.2   EOSINOPHIL % % 0.0*  --  1.6   BASOPHIL % % 0.1  --  0.3   IMM GRAN % % 0.5  --  0.3   NEUTROS ABS 10*3/mm3 6.36  --  3.19   LYMPHS ABS 10*3/mm3 0.85  --  1.82   MONOS ABS 10*3/mm3 0.68  --  0.65   EOS ABS 10*3/mm3 0.00  --  0.09   BASOS ABS 10*3/mm3 0.01  --  0.02   IMMATURE GRANS (ABS) 10*3/mm3 0.04  --  0.02   NRBC /100 WBC 0.0  --  0.0                             Microbiology Results (last 10 days)       ** No results found for the last 240 hours. **                albuterol, 2.5 mg, Nebulization, 4x Daily - RT  amLODIPine, 5 mg, Oral, Daily  guaiFENesin, 1,200 mg, Oral,  Q12H  lisinopril, 40 mg, Oral, Nightly  nicotine, 1 patch, Transdermal, Q24H  revefenacin, 175 mcg, Nebulization, Daily - RT  sodium chloride, 4 mL, Nebulization, BID - RT           Diagnostics:  XR Chest 1 View  Result Date: 6/20/2025  XR CHEST 1 VW-  HISTORY: Hypoxia.  COMPARISON: Chest radiograph 11/26/2022  FINDINGS:  A single view of the chest was obtained. The cardiac silhouette is normal in size. Projecting over the right heart border, there is an approximately 5 cm masslike structure, not clearly present on prior chest radiograph. This could potentially be due to some degree of left atrial enlargement, however, an underlying mass lesion is not excluded. Recommend further evaluation with contrast-enhanced CT chest. There is mild right basilar opacity, which could be due to atelectasis or pneumonia in the appropriate clinical setting. There is linear opacity at the left lung base, likely atelectasis/scarring. There is no large pleural effusion. Visualized osseous pressures are age-appropriate. There are surgical clips projecting over the right upper quadrant.  Discussed with Colleen, the patient's nurse, at 7:28 p.m. on 6/20/2025.  This report was finalized on 6/20/2025 7:28 PM by Dr. Fatou Li M.D on Workstation: [id number]      CT Abdomen Pelvis With Contrast  Result Date: 6/18/2025  CT ABDOMEN AND PELVIS WITH IV CONTRAST  HISTORY: Abdominal pain  TECHNIQUE: Radiation dose reduction techniques were utilized, including automated exposure control and exposure modulation based on body size. Axial images were obtained through the abdomen and pelvis after the administration of IV contrast. Coronal and sagittal reformatted images obtained.  COMPARISON: 9/8/2018  FINDINGS:  ABDOMEN: Mild atelectasis/scarring in the lung bases. There is a small hiatal hernia. There is a stable small hemangioma in the left lobe of the liver. Cholecystectomy. The spleen is normal in size. The right kidney is small with respect to  the left kidney. The kidneys are otherwise unremarkable. The adrenal glands are unremarkable. The pancreas is unremarkable. There is a small fat-containing upper abdominal wall hernia again demonstrated. There is a midline hernia present located just below the umbilicus. There is loop of small bowel contained within the hernia sac and there is fluid in the hernia sac surrounding the small bowel and some thickening of the bowel loop within the hernia sac. The small bowel loop proximal to the hernia sac is dilated and fluid-filled. Overall the CT appearance is concerning for possible incarcerated ventral hernia with at least partial obstruction. Correlate with physical exam findings to see if the hernia is reducible.  PELVIS: The bladder is unremarkable. There is a right adnexal cyst again demonstrated measuring about 2.7 cm not significantly changed. There is no free fluid in the pelvis. Colonic diverticulosis.      1. There is a midline hernia present just below the umbilicus. There is a loop of small bowel within the hernia sac with surrounding fluid in the hernia sac and some thickening of the bowel loop. The small bowel loop proximal to the hernia sac is dilated and fluid-filled. Overall the CT appearance is concerning for possible incarcerated ventral hernia with a least partial obstruction. Correlate with physical exam findings to see if the hernia is reducible. Surgical consultation may be needed 2. Additional findings as described  Radiation dose reduction techniques were utilized, including automated exposure control and exposure modulation based on body size.   This report was finalized on 6/18/2025 8:42 PM by Dr. Eddi Barrientos M.D on Workstation: [id number]      CT Abdomen Pelvis With Contrast  Result Date: 5/26/2025  REVIEWING YOUR TEST RESULTS IN [facility] IS NOT A SUBSTITUTE FOR DISCUSSING THOSE RESULTS WITH YOUR HEALTH CARE PROVIDER. PLEASE CONTACT YOUR PROVIDER VIA [employer] TO DISCUSS  ANY QUESTIONS OR CONCERNS YOU MAY HAVE REGARDING THESE TEST RESULTS.  RADIOLOGY REPORT FACILITY:  [facility] UNIT/AGE/GENDER: JULIANA  ER      AGE:65 Y          SEX:F PATIENT NAME/:  FATOU SOFIA    1960 UNIT NUMBER:  [id number] ACCOUNT NUMBER:  [id number] ACCESSION NUMBER:  [id number]   EXAMINATION: CT ABDOMEN AND PELVIS WITH CONTRAST                            DATE:  2025 HISTORY:Left lower quadrant abdominal pain and diarrhea TECHNIQUE:   Helical CT of the abdomen and pelvis was performed following the administration of oral contrast and during an uneventful injection of 100 mL of opitray 300 nonionic contrast media.   CT scans at this facility use dose modulation, iterative reconstruction and weight based dosing to reduce radiation dose to as low as reasonably achievable COMPARISON: FINDINGS: The lung bases are clear. Again seen is a approximately 1 cm left lobe liver hemangioma, grossly stable. The liver is otherwise unremarkable. The gallbladder surgically absent. There is significant common bile duct dilatation measuring up to 2 cm in diameter without centrally obstructing abnormality, stable in appearance compared to the prior exam. There is mild pancreatic ductal dilatation, in the pancreatic head, grossly stable. The spleen is unremarkable. The adrenal glands are unremarkable. The kidneys are unremarkable. There is a upper abdominal ventral hernia containing fat only. There is a mid to lower abdominal ventral hernia to the right of midline containing loops of small bowel. Small bowel loops are mildly distended and fluid-filled however there is no definite evidence for acute small bowel obstruction. There is extensive colonic diverticulosis with mild pericolonic inflammatory change involving the distal descending/proximal sigmoid colon. Mild acute diverticulitis cannot be excluded.. There is no free fluid or air. There is no focal fluid collection or abscess.  There is diffuse calcific atherosclerotic disease with evidence for moderate right common iliac artery stenosis, unchanged. Again seen is a 3 x 2.5 cm right ovarian cyst, grossly stable. Degenerative changes are seen in the lumbar spine. IMPRESSION: 1.   Upper abdominal ventral hernia containing fat only 2.  Mid to lower abdominal ventral hernia to the right midline containing loops of small bowel. Small bowel loops are mildly distended and fluid-filled however there is no definite evidence for acute small bowel obstruction. 3.  Extensive colonic diverticulosis with mild pericolonic inflammatory change involving the distal descending/proximal sigmoid colon. Mild acute diverticulitis cannot be excluded 4.  Stable 3 x 2.5 cm right ovarian cyst. 5.  Additional chronic findings above. Dictated by: Aron Garcia M.D. Images and Report reviewed and interpreted by: Aron Garcia M.D. <PS><Electronically signed by: Aron Garcia M.D.> 05/26/2025 1114 D: 05/26/2025 1101 T: 05/26/2025 1101    Results for orders placed during the hospital encounter of 11/24/22    Adult Transthoracic Echo Complete W/ Cont if Necessary Per Protocol    Interpretation Summary    Left ventricular systolic function is normal. Calculated left ventricular EF = 54.4%    Left ventricular wall thickness is consistent with mild concentric hypertrophy.    Left ventricular diastolic function was normal.    There is calcification of the aortic valve.      Reviewed there is basilar atelectasis bilaterally.  There is some rounded density in the sort of right retrocardiac area    Active Hospital Problems    Diagnosis  POA    **Incarcerated hernia of abdominal cavity [K45.0]  Yes    Incarcerated ventral hernia [K43.6]  Yes      Resolved Hospital Problems   No resolved problems to display.         Assessment & Plan     Postop acute hypoxic respiratory failure her O2 requirements have fallen dramatically already.  She had preoperative bibasilar atelectasis  on her CT abdomen.  Suspect that she has significant atelectasis just based on exam.  This is on some probable underlying COPD I would be shocked if she did not have COPD with her smoking history does not sound like she is ever had formal pulmonary functions to diagnose but she has been given a clinical diagnosis in the past.  Will get really aggressive with pulmonary hygiene expectorants and bronchodilators see if we can get her cleared out quickly.  Pulmonary embolism is fairly unlikely at this point in time preoperatively she was on room air she came out on high flow and she is weaned down dramatically on her O2 requirements.  I took her off looks like she is can maintain saturations of 90 or above on room air.  I will have them walk her again today see if she needs O2.  Her CT no formal report does show bibasilar atelectasis as suspected and will have her continue pulmonary hygiene  Incarcerated incisional hernia status post open incisional hernia repair with mesh on 6/19/2025  Tobacco abuse greater than 50-year smoking history she absolutely needs to stop smoking discussed with her no commitment  COPD needs pulmonary functions quantitate but she does have significant emphysema on her CT scan.  When she has recovered from this to determine extent of disease and to help plan better treatment.  Questionable new mass on chest x-ray CTA chest completed I do not really see that mass there is some nonspecific mediastinal adenopathy I think but does not look to be pathologically large but I will wait for radiology's review of the scans    Plan for disposition: Will check exercise oximetry again today hopefully she is improved enough she will need oxygen she does have atelectasis she is can need to continue pulmonary hygiene she can do that at home.  I also want to follow-up radiology's results on the CT angiogram    Addendum: She did well on her exercise oximetry no need for oxygen I think she just needs to continue  working on her pulmonary hygiene she is got significant atelectasis.  CT angiogram as noted above except radiology noted a enhancing lesion in the liver that they recommended dedicated MRI of the liver this certainly can be an outpatient study.  I want her to go home with OPEP and bronchodilators and Mucinex.  Will be glad to see her in the office to follow-up her COPD.  Have put the orders for pulmonary meds and discharge rec okay to discharge at any time.  Follow-up with primary care regarding liver lesion      Dino Crocker Jr, MD  06/21/25  10:03 EDT    Time:

## 2025-06-21 NOTE — PLAN OF CARE
Problem: Adult Inpatient Plan of Care  Goal: Plan of Care Review  Outcome: Progressing  Goal: Patient-Specific Goal (Individualized)  Outcome: Progressing  Goal: Absence of Hospital-Acquired Illness or Injury  Outcome: Progressing  Goal: Optimal Comfort and Wellbeing  Outcome: Progressing  Goal: Readiness for Transition of Care  Outcome: Progressing     Problem: Pain Acute  Goal: Optimal Pain Control and Function  Outcome: Progressing     Problem: Comorbidity Management  Goal: Blood Pressure in Desired Range  Outcome: Progressing   Goal Outcome Evaluation:

## 2025-06-23 NOTE — CASE MANAGEMENT/SOCIAL WORK
Continued Stay Note  [facility]     Patient Name: Mariana Loya  MRN: [id number]  Today's Date: 6/23/2025    Admit Date: 6/18/2025    Plan: Home.[employer] to deliver nebulizer to pt's home.   Discharge Plan       Row Name 06/23/25 0950       Plan    Plan Home.[employer] to deliver nebulizer to pt's home.    Plan Comments Pt discharged over the weekend. Orders reviewed. CCP notified Maximiliano/Braulio that pt needs a nebulizer for home; [employer] will deliver nebulizer to pt's home.    Final Discharge Disposition Code 01 - home or self-care    Final Note Home.[employer] to deliver nebulizer to pt's home.                   Discharge Codes    No documentation.                 Expected Discharge Date and Time       Expected Discharge Date Expected Discharge Time    Jun 21, 2025               Sandi Flower RN

## 2025-06-23 NOTE — CASE MANAGEMENT/SOCIAL WORK
Case Management Discharge Note      Final Note: Home.[employer] to deliver nebulizer to pt's home.         Selected Continued Care - Discharged on 6/21/2025 Admission date: 6/18/2025 - Discharge disposition: Home or Self Care      Destination    No services have been selected for the patient.                Durable Medical Equipment Coordination complete.      Service Provider Services Address Phone Fax Patient Preferred    [facility] [address], [place] [id number] [phone] --              Dialysis/Infusion    No services have been selected for the patient.                Home Medical Care    No services have been selected for the patient.                Therapy    No services have been selected for the patient.                Community Resources    No services have been selected for the patient.                Community & DME    No services have been selected for the patient.                    Transportation Services  Private: Car    Final Discharge Disposition Code: 01 - home or self-care

## 2025-06-26 ENCOUNTER — OFFICE VISIT (OUTPATIENT)
Dept: SURGERY | Facility: CLINIC | Age: 65
End: 2025-06-26
Payer: MEDICARE

## 2025-06-26 VITALS
SYSTOLIC BLOOD PRESSURE: 128 MMHG | HEART RATE: 104 BPM | HEIGHT: 67 IN | DIASTOLIC BLOOD PRESSURE: 68 MMHG | OXYGEN SATURATION: 97 % | WEIGHT: 197 LBS | BODY MASS INDEX: 30.92 KG/M2

## 2025-06-26 DIAGNOSIS — Z09 ENCOUNTER FOR FOLLOW-UP: Primary | ICD-10-CM

## 2025-06-26 NOTE — LETTER
June 26, 2025     Patient: Mariana Loya   YOB: 1960   Date of Visit: 6/26/2025       To Whom It May Concern:    It is my medical opinion that Mariana Loya should remain out of work until 7/13/25 at which time she may return to full duty.           Sincerely,        Rito Tubbs MD    CC: No Recipients

## 2025-06-27 NOTE — PROGRESS NOTES
I have reviewed the notes, assessments, and/or procedures performed by Eddi Luther, I concur with her/his documentation of Mariana Loya.

## 2025-06-27 NOTE — PROGRESS NOTES
CC:  Follow-up ventral hernia repair    S:  65-year-old lady returning to the office today status post open incisional hernia repair with 4 cm fascial defect with large Ventralex ST mesh being placed on 6/19/2025.  Overall she is doing better since surgery with her biggest complaint being increased fatigue and weakness.  She is tolerating a diet and having normal bowel movements.  Her pain is also well-controlled on OTC medications.  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  She denies nausea, vomiting, fever, chills or any additional complaints.    O:  Vital signs noted  Abdomen: Resolving ecchymosis, well-healed infraumbilical midline incision, skin glue still in place, no fluctuance, signs of infection  Psych: Alert and orient x 3    A/P:  I discussed the expected recovery for this procedure, stating that she is well on track at this stage.  I did encourage her to increase her activity level as she is not being active, just sleeping and sitting around the house mostly.  I did provide her a return to work note for 7/13/2025 as well.  I advised her not to lift greater than 10 to 15 pounds until she is approximately 6 weeks out from surgery.  All questions were answered and she was willing to proceed with all recommendations.    Eddi Luther PA-C

## (undated) DEVICE — SUT MNCRYL PLS ANTIB UD 4/0 PS2 18IN

## (undated) DEVICE — STPLR SKIN VISISTAT WD 35CT

## (undated) DEVICE — GLV SURG BIOGEL LTX PF 7 1/2

## (undated) DEVICE — GLV SURG BIOGEL LTX PF 6

## (undated) DEVICE — ELECTRD BLD EZ CLN MOD XLNG 2.75IN

## (undated) DEVICE — PK PROC MAJ 40

## (undated) DEVICE — TUBING, SUCTION, 1/4" X 10', STRAIGHT: Brand: MEDLINE

## (undated) DEVICE — NDL HYPO ECLPS SFTY 22G 1 1/2IN

## (undated) DEVICE — SUT VIC 3/0 TIES 18IN J110T

## (undated) DEVICE — GOWN ,SIRUS,NONREINFORCED SMALL: Brand: MEDLINE

## (undated) DEVICE — SUT NUROLON 0 CT1 CR8 18IN C521D

## (undated) DEVICE — ADHS SKIN DERMABOND TOP ADVANCED

## (undated) DEVICE — THE TORRENT IRRIGATION SCOPE CONNECTOR IS USED WITH THE TORRENT IRRIGATION TUBING TO PROVIDE IRRIGATION FLUIDS SUCH AS STERILE WATER DURING GASTROINTESTINAL ENDOSCOPIC PROCEDURES WHEN USED IN CONJUNCTION WITH AN IRRIGATION PUMP (OR ELECTROSURGICAL UNIT).: Brand: TORRENT

## (undated) DEVICE — THE SINGLE USE ETRAP – POLYP TRAP IS USED FOR SUCTION RETRIEVAL OF ENDOSCOPICALLY REMOVED POLYPS.: Brand: ETRAP

## (undated) DEVICE — SNAR POLYP CAPTIVATOR HEX 27MM 240CM

## (undated) DEVICE — SUT PDS 0 CT2 27IN DYED Z334H

## (undated) DEVICE — CANN NASL CO2 TRULINK W/O2 A/

## (undated) DEVICE — VIOLET BRAIDED (POLYGLACTIN 910), SYNTHETIC ABSORBABLE SUTURE: Brand: COATED VICRYL

## (undated) DEVICE — GLV SURG PREMIERPRO ORTHO LTX PF SZ7.5 BRN

## (undated) DEVICE — SNAR POLYP SENSATION STDOVL 27 240 BX40

## (undated) DEVICE — APPL CHLORAPREP W/TINT 26ML ORNG

## (undated) DEVICE — SPNG LAP 18X18IN LF STRL PK/5

## (undated) DEVICE — BARRIER, ABSORBABLE, ADHESION: Brand: SEPRAFILM® PROCEDURE PACK

## (undated) DEVICE — SUT ANTIBAC VICRYL/PLS ABS TIE COAT SZ3/0 12X18IN UD

## (undated) DEVICE — SKIN PREP TRAY W/CHG: Brand: MEDLINE INDUSTRIES, INC.

## (undated) DEVICE — PATIENT RETURN ELECTRODE, SINGLE-USE, CONTACT QUALITY MONITORING, ADULT, WITH 9FT CORD, FOR PATIENTS WEIGING OVER 33LBS. (15KG): Brand: MEGADYNE

## (undated) DEVICE — THE CARR-LOCKE INJECTION NEEDLE IS A SINGLE USE, DISPOSABLE, FLEXIBLE SHEATH INJECTION NEEDLE USED FOR THE INJECTION OF VARIOUS TYPES OF MEDIA THROUGH FLEXIBLE ENDOSCOPES.

## (undated) DEVICE — IRRIGATOR BULB ASEPTO 60CC STRL

## (undated) DEVICE — NDL HYPO PRECISIONGLIDE REG 25G 1 1/2

## (undated) DEVICE — Device: Brand: DEFENDO AIR/WATER/SUCTION AND BIOPSY VALVE